# Patient Record
Sex: FEMALE | Race: WHITE | Employment: OTHER | URBAN - METROPOLITAN AREA
[De-identification: names, ages, dates, MRNs, and addresses within clinical notes are randomized per-mention and may not be internally consistent; named-entity substitution may affect disease eponyms.]

---

## 2017-01-25 DIAGNOSIS — E03.9 HYPOTHYROIDISM, UNSPECIFIED TYPE: ICD-10-CM

## 2017-01-25 RX ORDER — LEVOTHYROXINE SODIUM 0.12 MG/1
TABLET ORAL
Qty: 90 TABLET | Refills: 1 | Status: SHIPPED | OUTPATIENT
Start: 2017-01-25 | End: 2017-05-22 | Stop reason: SDUPTHER

## 2017-02-08 ENCOUNTER — OFFICE VISIT (OUTPATIENT)
Dept: FAMILY MEDICINE CLINIC | Age: 67
End: 2017-02-08

## 2017-02-08 VITALS
HEART RATE: 81 BPM | TEMPERATURE: 98.8 F | BODY MASS INDEX: 33.77 KG/M2 | HEIGHT: 60 IN | WEIGHT: 172 LBS | DIASTOLIC BLOOD PRESSURE: 72 MMHG | RESPIRATION RATE: 15 BRPM | SYSTOLIC BLOOD PRESSURE: 108 MMHG

## 2017-02-08 DIAGNOSIS — N30.00 ACUTE CYSTITIS WITHOUT HEMATURIA: Primary | ICD-10-CM

## 2017-02-08 DIAGNOSIS — R30.0 DYSURIA: ICD-10-CM

## 2017-02-08 LAB
BILIRUBIN, POC: ABNORMAL
BLOOD URINE, POC: 200
CLARITY, POC: CLEAR
COLOR, POC: YELLOW
GLUCOSE URINE, POC: ABNORMAL
KETONES, POC: ABNORMAL
LEUKOCYTE EST, POC: 500
NITRITE, POC: ABNORMAL
PH, POC: 6
PROTEIN, POC: 0.3
SPECIFIC GRAVITY, POC: 1.02
UROBILINOGEN, POC: 3.5

## 2017-02-08 PROCEDURE — 1090F PRES/ABSN URINE INCON ASSESS: CPT | Performed by: NURSE PRACTITIONER

## 2017-02-08 PROCEDURE — 3014F SCREEN MAMMO DOC REV: CPT | Performed by: NURSE PRACTITIONER

## 2017-02-08 PROCEDURE — G8484 FLU IMMUNIZE NO ADMIN: HCPCS | Performed by: NURSE PRACTITIONER

## 2017-02-08 PROCEDURE — 4040F PNEUMOC VAC/ADMIN/RCVD: CPT | Performed by: NURSE PRACTITIONER

## 2017-02-08 PROCEDURE — G8417 CALC BMI ABV UP PARAM F/U: HCPCS | Performed by: NURSE PRACTITIONER

## 2017-02-08 PROCEDURE — 99213 OFFICE O/P EST LOW 20 MIN: CPT | Performed by: NURSE PRACTITIONER

## 2017-02-08 PROCEDURE — G8427 DOCREV CUR MEDS BY ELIG CLIN: HCPCS | Performed by: NURSE PRACTITIONER

## 2017-02-08 PROCEDURE — 1123F ACP DISCUSS/DSCN MKR DOCD: CPT | Performed by: NURSE PRACTITIONER

## 2017-02-08 PROCEDURE — 81003 URINALYSIS AUTO W/O SCOPE: CPT | Performed by: NURSE PRACTITIONER

## 2017-02-08 PROCEDURE — 3017F COLORECTAL CA SCREEN DOC REV: CPT | Performed by: NURSE PRACTITIONER

## 2017-02-08 PROCEDURE — 1036F TOBACCO NON-USER: CPT | Performed by: NURSE PRACTITIONER

## 2017-02-08 PROCEDURE — G8400 PT W/DXA NO RESULTS DOC: HCPCS | Performed by: NURSE PRACTITIONER

## 2017-02-08 RX ORDER — CIPROFLOXACIN 500 MG/1
500 TABLET, FILM COATED ORAL 2 TIMES DAILY
Qty: 20 TABLET | Refills: 0 | Status: SHIPPED | OUTPATIENT
Start: 2017-02-08 | End: 2017-02-18

## 2017-02-08 ASSESSMENT — ENCOUNTER SYMPTOMS
NAUSEA: 0
VOMITING: 0

## 2017-04-25 RX ORDER — SERTRALINE HYDROCHLORIDE 25 MG/1
TABLET, FILM COATED ORAL
Qty: 30 TABLET | Refills: 5 | Status: SHIPPED | OUTPATIENT
Start: 2017-04-25 | End: 2017-09-02 | Stop reason: SDUPTHER

## 2017-04-26 RX ORDER — HYDROCHLOROTHIAZIDE 25 MG/1
12.5 TABLET ORAL DAILY
Qty: 30 TABLET | Refills: 6 | Status: SHIPPED | OUTPATIENT
Start: 2017-04-26 | End: 2017-08-01

## 2017-04-26 RX ORDER — METOPROLOL SUCCINATE 50 MG/1
50 TABLET, EXTENDED RELEASE ORAL DAILY
Qty: 30 TABLET | Refills: 6 | Status: SHIPPED | OUTPATIENT
Start: 2017-04-26 | End: 2017-08-01

## 2017-05-03 ENCOUNTER — OFFICE VISIT (OUTPATIENT)
Dept: FAMILY MEDICINE CLINIC | Age: 67
End: 2017-05-03

## 2017-05-03 VITALS
DIASTOLIC BLOOD PRESSURE: 60 MMHG | SYSTOLIC BLOOD PRESSURE: 115 MMHG | BODY MASS INDEX: 34.26 KG/M2 | WEIGHT: 174.5 LBS | OXYGEN SATURATION: 96 % | TEMPERATURE: 99.6 F | HEIGHT: 60 IN | HEART RATE: 83 BPM | RESPIRATION RATE: 16 BRPM

## 2017-05-03 DIAGNOSIS — R30.0 DYSURIA: Primary | ICD-10-CM

## 2017-05-03 DIAGNOSIS — N39.0 FREQUENT UTI: ICD-10-CM

## 2017-05-03 DIAGNOSIS — N30.20 CHRONIC CYSTITIS: ICD-10-CM

## 2017-05-03 LAB
BILIRUBIN, POC: NEGATIVE
BLOOD URINE, POC: ABNORMAL
CLARITY, POC: ABNORMAL
COLOR, POC: ABNORMAL
GLUCOSE URINE, POC: NEGATIVE
KETONES, POC: NEGATIVE
LEUKOCYTE EST, POC: NEGATIVE
NITRITE, POC: NEGATIVE
PH, POC: 6
PROTEIN, POC: ABNORMAL
SPECIFIC GRAVITY, POC: 1.03
UROBILINOGEN, POC: ABNORMAL

## 2017-05-03 PROCEDURE — G8417 CALC BMI ABV UP PARAM F/U: HCPCS | Performed by: NURSE PRACTITIONER

## 2017-05-03 PROCEDURE — 3017F COLORECTAL CA SCREEN DOC REV: CPT | Performed by: NURSE PRACTITIONER

## 2017-05-03 PROCEDURE — 1036F TOBACCO NON-USER: CPT | Performed by: NURSE PRACTITIONER

## 2017-05-03 PROCEDURE — 1123F ACP DISCUSS/DSCN MKR DOCD: CPT | Performed by: NURSE PRACTITIONER

## 2017-05-03 PROCEDURE — 99213 OFFICE O/P EST LOW 20 MIN: CPT | Performed by: NURSE PRACTITIONER

## 2017-05-03 PROCEDURE — G8400 PT W/DXA NO RESULTS DOC: HCPCS | Performed by: NURSE PRACTITIONER

## 2017-05-03 PROCEDURE — 1090F PRES/ABSN URINE INCON ASSESS: CPT | Performed by: NURSE PRACTITIONER

## 2017-05-03 PROCEDURE — 81003 URINALYSIS AUTO W/O SCOPE: CPT | Performed by: NURSE PRACTITIONER

## 2017-05-03 PROCEDURE — 4040F PNEUMOC VAC/ADMIN/RCVD: CPT | Performed by: NURSE PRACTITIONER

## 2017-05-03 PROCEDURE — 3014F SCREEN MAMMO DOC REV: CPT | Performed by: NURSE PRACTITIONER

## 2017-05-03 PROCEDURE — G8427 DOCREV CUR MEDS BY ELIG CLIN: HCPCS | Performed by: NURSE PRACTITIONER

## 2017-05-03 RX ORDER — PHENAZOPYRIDINE HYDROCHLORIDE 200 MG/1
200 TABLET, FILM COATED ORAL 3 TIMES DAILY PRN
Qty: 30 TABLET | Refills: 0 | Status: SHIPPED | OUTPATIENT
Start: 2017-05-03 | End: 2017-05-06

## 2017-05-03 RX ORDER — NITROFURANTOIN 25; 75 MG/1; MG/1
100 CAPSULE ORAL 2 TIMES DAILY
Qty: 10 CAPSULE | Refills: 0 | Status: SHIPPED | OUTPATIENT
Start: 2017-05-03 | End: 2017-05-08

## 2017-05-03 ASSESSMENT — ENCOUNTER SYMPTOMS
NAUSEA: 0
VOMITING: 0

## 2017-05-04 DIAGNOSIS — R30.0 DYSURIA: ICD-10-CM

## 2017-05-04 DIAGNOSIS — N39.0 FREQUENT UTI: ICD-10-CM

## 2017-05-04 DIAGNOSIS — N30.20 CHRONIC CYSTITIS: ICD-10-CM

## 2017-05-06 LAB — URINE CULTURE, ROUTINE: NORMAL

## 2017-05-08 DIAGNOSIS — R39.9 UTI SYMPTOMS: ICD-10-CM

## 2017-05-08 DIAGNOSIS — R10.30 LOWER ABDOMINAL PAIN: Primary | ICD-10-CM

## 2017-05-09 DIAGNOSIS — N32.89 BLADDER SPASM: ICD-10-CM

## 2017-05-09 DIAGNOSIS — N30.11 INTERSTITIAL CYSTITIS (CHRONIC) WITH HEMATURIA: Primary | ICD-10-CM

## 2017-05-09 RX ORDER — OXYBUTYNIN CHLORIDE 10 MG/1
10 TABLET, EXTENDED RELEASE ORAL DAILY
Qty: 30 TABLET | Refills: 3 | Status: SHIPPED | OUTPATIENT
Start: 2017-05-09 | End: 2017-08-01

## 2017-05-22 ENCOUNTER — OFFICE VISIT (OUTPATIENT)
Dept: FAMILY MEDICINE CLINIC | Age: 67
End: 2017-05-22

## 2017-05-22 VITALS
HEIGHT: 60 IN | SYSTOLIC BLOOD PRESSURE: 100 MMHG | WEIGHT: 170 LBS | TEMPERATURE: 98.4 F | RESPIRATION RATE: 16 BRPM | BODY MASS INDEX: 33.38 KG/M2 | HEART RATE: 76 BPM | DIASTOLIC BLOOD PRESSURE: 58 MMHG

## 2017-05-22 DIAGNOSIS — E78.5 DYSLIPIDEMIA: ICD-10-CM

## 2017-05-22 DIAGNOSIS — E03.9 HYPOTHYROIDISM, UNSPECIFIED TYPE: ICD-10-CM

## 2017-05-22 DIAGNOSIS — E03.9 HYPOTHYROIDISM, UNSPECIFIED TYPE: Primary | ICD-10-CM

## 2017-05-22 DIAGNOSIS — S46.001A ROTATOR CUFF INJURY, RIGHT, INITIAL ENCOUNTER: ICD-10-CM

## 2017-05-22 DIAGNOSIS — M67.911 DYSFUNCTION OF RIGHT ROTATOR CUFF: ICD-10-CM

## 2017-05-22 DIAGNOSIS — R53.83 FATIGUE, UNSPECIFIED TYPE: ICD-10-CM

## 2017-05-22 DIAGNOSIS — E55.9 VITAMIN D DEFICIENCY: ICD-10-CM

## 2017-05-22 LAB
ALBUMIN SERPL-MCNC: 4.2 G/DL (ref 3.9–4.9)
ALP BLD-CCNC: 59 U/L (ref 40–130)
ALT SERPL-CCNC: 18 U/L (ref 0–33)
ANION GAP SERPL CALCULATED.3IONS-SCNC: 12 MEQ/L (ref 7–13)
AST SERPL-CCNC: 15 U/L (ref 0–35)
BILIRUB SERPL-MCNC: 0.2 MG/DL (ref 0–1.2)
BUN BLDV-MCNC: 14 MG/DL (ref 8–23)
CALCIUM SERPL-MCNC: 9 MG/DL (ref 8.6–10.2)
CHLORIDE BLD-SCNC: 100 MEQ/L (ref 98–107)
CHOLESTEROL, TOTAL: 188 MG/DL (ref 0–199)
CO2: 25 MEQ/L (ref 22–29)
CREAT SERPL-MCNC: 0.53 MG/DL (ref 0.5–0.9)
GFR AFRICAN AMERICAN: >60
GFR NON-AFRICAN AMERICAN: >60
GLOBULIN: 2.5 G/DL (ref 2.3–3.5)
GLUCOSE BLD-MCNC: 85 MG/DL (ref 74–109)
HCT VFR BLD CALC: 41.4 % (ref 37–47)
HDLC SERPL-MCNC: 62 MG/DL (ref 40–59)
HEMOGLOBIN: 13.5 G/DL (ref 12–16)
LDL CHOLESTEROL CALCULATED: 98 MG/DL (ref 0–129)
MCH RBC QN AUTO: 28.8 PG (ref 27–31.3)
MCHC RBC AUTO-ENTMCNC: 32.6 % (ref 33–37)
MCV RBC AUTO: 88.1 FL (ref 82–100)
PDW BLD-RTO: 14.2 % (ref 11.5–14.5)
PLATELET # BLD: 252 K/UL (ref 130–400)
POTASSIUM SERPL-SCNC: 4.4 MEQ/L (ref 3.5–5.1)
RBC # BLD: 4.7 M/UL (ref 4.2–5.4)
SODIUM BLD-SCNC: 137 MEQ/L (ref 132–144)
T4 FREE: 1.71 NG/DL (ref 0.93–1.7)
TOTAL PROTEIN: 6.7 G/DL (ref 6.4–8.1)
TRIGL SERPL-MCNC: 142 MG/DL (ref 0–200)
TSH SERPL DL<=0.05 MIU/L-ACNC: 0.24 UIU/ML (ref 0.27–4.2)
VITAMIN B-12: 360 PG/ML (ref 211–946)
VITAMIN D 25-HYDROXY: 29.8 NG/ML (ref 30–100)
WBC # BLD: 5.7 K/UL (ref 4.8–10.8)

## 2017-05-22 PROCEDURE — 4040F PNEUMOC VAC/ADMIN/RCVD: CPT | Performed by: FAMILY MEDICINE

## 2017-05-22 PROCEDURE — G8417 CALC BMI ABV UP PARAM F/U: HCPCS | Performed by: FAMILY MEDICINE

## 2017-05-22 PROCEDURE — G8400 PT W/DXA NO RESULTS DOC: HCPCS | Performed by: FAMILY MEDICINE

## 2017-05-22 PROCEDURE — 1036F TOBACCO NON-USER: CPT | Performed by: FAMILY MEDICINE

## 2017-05-22 PROCEDURE — 3014F SCREEN MAMMO DOC REV: CPT | Performed by: FAMILY MEDICINE

## 2017-05-22 PROCEDURE — 1123F ACP DISCUSS/DSCN MKR DOCD: CPT | Performed by: FAMILY MEDICINE

## 2017-05-22 PROCEDURE — 3017F COLORECTAL CA SCREEN DOC REV: CPT | Performed by: FAMILY MEDICINE

## 2017-05-22 PROCEDURE — G8427 DOCREV CUR MEDS BY ELIG CLIN: HCPCS | Performed by: FAMILY MEDICINE

## 2017-05-22 PROCEDURE — 1090F PRES/ABSN URINE INCON ASSESS: CPT | Performed by: FAMILY MEDICINE

## 2017-05-22 PROCEDURE — 99214 OFFICE O/P EST MOD 30 MIN: CPT | Performed by: FAMILY MEDICINE

## 2017-05-22 RX ORDER — LEVOTHYROXINE SODIUM 0.12 MG/1
TABLET ORAL
Qty: 90 TABLET | Refills: 1 | Status: SHIPPED | OUTPATIENT
Start: 2017-05-22 | End: 2018-02-28 | Stop reason: SDUPTHER

## 2017-05-22 ASSESSMENT — PATIENT HEALTH QUESTIONNAIRE - PHQ9
1. LITTLE INTEREST OR PLEASURE IN DOING THINGS: 0
SUM OF ALL RESPONSES TO PHQ9 QUESTIONS 1 & 2: 0
SUM OF ALL RESPONSES TO PHQ QUESTIONS 1-9: 0
2. FEELING DOWN, DEPRESSED OR HOPELESS: 0

## 2017-05-23 ENCOUNTER — HOSPITAL ENCOUNTER (OUTPATIENT)
Dept: PHYSICAL THERAPY | Age: 67
Setting detail: THERAPIES SERIES
Discharge: HOME | End: 2017-05-23
Payer: COMMERCIAL

## 2017-05-23 PROCEDURE — G8985 CARRY GOAL STATUS: HCPCS

## 2017-05-23 PROCEDURE — G8984 CARRY CURRENT STATUS: HCPCS

## 2017-05-23 PROCEDURE — 97161 PT EVAL LOW COMPLEX 20 MIN: CPT

## 2017-05-23 ASSESSMENT — PAIN SCALES - GENERAL: PAINLEVEL_OUTOF10: 5

## 2017-05-23 ASSESSMENT — PAIN DESCRIPTION - LOCATION: LOCATION: SHOULDER

## 2017-05-23 ASSESSMENT — PAIN DESCRIPTION - ORIENTATION: ORIENTATION: RIGHT

## 2017-05-23 ASSESSMENT — PAIN DESCRIPTION - DESCRIPTORS: DESCRIPTORS: ACHING

## 2017-05-25 ENCOUNTER — HOSPITAL ENCOUNTER (OUTPATIENT)
Dept: PHYSICAL THERAPY | Age: 67
Setting detail: THERAPIES SERIES
Discharge: HOME | End: 2017-05-25
Payer: COMMERCIAL

## 2017-05-25 PROCEDURE — 97110 THERAPEUTIC EXERCISES: CPT

## 2017-05-25 PROCEDURE — 97140 MANUAL THERAPY 1/> REGIONS: CPT

## 2017-05-25 ASSESSMENT — PAIN SCALES - GENERAL: PAINLEVEL_OUTOF10: 6

## 2017-05-25 ASSESSMENT — PAIN DESCRIPTION - LOCATION: LOCATION: SHOULDER

## 2017-05-25 ASSESSMENT — PAIN DESCRIPTION - DESCRIPTORS: DESCRIPTORS: ACHING

## 2017-05-25 ASSESSMENT — PAIN DESCRIPTION - ORIENTATION: ORIENTATION: RIGHT

## 2017-05-30 ENCOUNTER — HOSPITAL ENCOUNTER (OUTPATIENT)
Dept: PHYSICAL THERAPY | Age: 67
Setting detail: THERAPIES SERIES
Discharge: HOME | End: 2017-05-30
Payer: MEDICARE

## 2017-05-30 ENCOUNTER — TELEPHONE (OUTPATIENT)
Dept: FAMILY MEDICINE CLINIC | Age: 67
End: 2017-05-30

## 2017-05-30 DIAGNOSIS — S46.001A ROTATOR CUFF INJURY, RIGHT, INITIAL ENCOUNTER: Primary | ICD-10-CM

## 2017-05-30 PROCEDURE — 97110 THERAPEUTIC EXERCISES: CPT

## 2017-05-30 PROCEDURE — 97140 MANUAL THERAPY 1/> REGIONS: CPT

## 2017-05-30 ASSESSMENT — PAIN DESCRIPTION - LOCATION: LOCATION: SHOULDER

## 2017-05-30 ASSESSMENT — PAIN DESCRIPTION - DESCRIPTORS: DESCRIPTORS: ACHING

## 2017-05-30 ASSESSMENT — PAIN DESCRIPTION - ORIENTATION: ORIENTATION: RIGHT

## 2017-05-30 ASSESSMENT — PAIN SCALES - GENERAL: PAINLEVEL_OUTOF10: 9

## 2017-06-01 ENCOUNTER — TELEPHONE (OUTPATIENT)
Dept: FAMILY MEDICINE CLINIC | Age: 67
End: 2017-06-01

## 2017-06-01 DIAGNOSIS — S46.001A ROTATOR CUFF INJURY, RIGHT, INITIAL ENCOUNTER: Primary | ICD-10-CM

## 2017-06-06 ENCOUNTER — HOSPITAL ENCOUNTER (OUTPATIENT)
Dept: PHYSICAL THERAPY | Age: 67
Discharge: HOME | End: 2017-06-06

## 2017-06-07 ENCOUNTER — HOSPITAL ENCOUNTER (OUTPATIENT)
Dept: MRI IMAGING | Age: 67
Discharge: HOME | End: 2017-06-07
Payer: MEDICARE

## 2017-06-07 DIAGNOSIS — S46.001A ROTATOR CUFF INJURY, RIGHT, INITIAL ENCOUNTER: ICD-10-CM

## 2017-06-07 PROCEDURE — 73221 MRI JOINT UPR EXTREM W/O DYE: CPT

## 2017-06-08 ENCOUNTER — HOSPITAL ENCOUNTER (OUTPATIENT)
Dept: PHYSICAL THERAPY | Age: 67
Setting detail: THERAPIES SERIES
Discharge: HOME | End: 2017-06-08
Payer: COMMERCIAL

## 2017-06-08 PROCEDURE — 97140 MANUAL THERAPY 1/> REGIONS: CPT

## 2017-06-08 PROCEDURE — G0283 ELEC STIM OTHER THAN WOUND: HCPCS

## 2017-06-08 PROCEDURE — 97110 THERAPEUTIC EXERCISES: CPT

## 2017-06-08 ASSESSMENT — PAIN DESCRIPTION - FREQUENCY: FREQUENCY: CONTINUOUS

## 2017-06-08 ASSESSMENT — PAIN DESCRIPTION - PROGRESSION: CLINICAL_PROGRESSION: GRADUALLY WORSENING

## 2017-06-08 ASSESSMENT — PAIN SCALES - GENERAL: PAINLEVEL_OUTOF10: 3

## 2017-06-08 ASSESSMENT — PAIN DESCRIPTION - ORIENTATION: ORIENTATION: RIGHT

## 2017-06-08 ASSESSMENT — PAIN DESCRIPTION - DESCRIPTORS: DESCRIPTORS: ACHING

## 2017-06-08 ASSESSMENT — PAIN DESCRIPTION - LOCATION: LOCATION: SHOULDER

## 2017-06-12 ENCOUNTER — TELEPHONE (OUTPATIENT)
Dept: FAMILY MEDICINE CLINIC | Age: 67
End: 2017-06-12

## 2017-06-12 DIAGNOSIS — S46.811A TRAUMATIC TEAR OF SUPRASPINATUS TENDON OF RIGHT SHOULDER, INITIAL ENCOUNTER: Primary | ICD-10-CM

## 2017-06-14 ENCOUNTER — TELEPHONE (OUTPATIENT)
Dept: FAMILY MEDICINE CLINIC | Age: 67
End: 2017-06-14

## 2017-06-14 DIAGNOSIS — M25.511 CHRONIC PAIN OF BOTH SHOULDERS: Primary | ICD-10-CM

## 2017-06-14 DIAGNOSIS — M25.512 CHRONIC PAIN OF BOTH SHOULDERS: Primary | ICD-10-CM
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
## 2017-06-14 DIAGNOSIS — G89.29 CHRONIC PAIN OF BOTH SHOULDERS: Primary | ICD-10-CM

## 2017-06-15 ENCOUNTER — HOSPITAL ENCOUNTER (OUTPATIENT)
Dept: PHYSICAL THERAPY | Age: 67
Discharge: HOME | End: 2017-06-15

## 2017-06-29 ENCOUNTER — HOSPITAL ENCOUNTER (OUTPATIENT)
Dept: ORTHOPEDIC SURGERY | Age: 67
Discharge: HOME | End: 2017-06-29
Payer: MEDICARE

## 2017-06-29 DIAGNOSIS — M25.512 PAIN OF BOTH SHOULDER JOINTS: ICD-10-CM

## 2017-06-29 DIAGNOSIS — M25.511 PAIN OF BOTH SHOULDER JOINTS: ICD-10-CM

## 2017-07-14 ENCOUNTER — CLINICAL DOCUMENTATION (OUTPATIENT)
Dept: PHYSICAL THERAPY | Age: 67
End: 2017-07-14

## 2017-08-01 ENCOUNTER — OFFICE VISIT (OUTPATIENT)
Dept: FAMILY MEDICINE CLINIC | Age: 67
End: 2017-08-01

## 2017-08-01 VITALS
DIASTOLIC BLOOD PRESSURE: 72 MMHG | HEIGHT: 60 IN | SYSTOLIC BLOOD PRESSURE: 110 MMHG | WEIGHT: 176 LBS | HEART RATE: 74 BPM | TEMPERATURE: 99.2 F | RESPIRATION RATE: 14 BRPM | BODY MASS INDEX: 34.55 KG/M2

## 2017-08-01 DIAGNOSIS — E03.9 HYPOTHYROIDISM, UNSPECIFIED TYPE: Primary | ICD-10-CM

## 2017-08-01 DIAGNOSIS — M75.111 INCOMPLETE TEAR OF RIGHT ROTATOR CUFF: ICD-10-CM

## 2017-08-01 DIAGNOSIS — B00.2 ORAL HERPES: ICD-10-CM

## 2017-08-01 PROCEDURE — 3014F SCREEN MAMMO DOC REV: CPT | Performed by: FAMILY MEDICINE

## 2017-08-01 PROCEDURE — G8400 PT W/DXA NO RESULTS DOC: HCPCS | Performed by: FAMILY MEDICINE

## 2017-08-01 PROCEDURE — 1036F TOBACCO NON-USER: CPT | Performed by: FAMILY MEDICINE

## 2017-08-01 PROCEDURE — 4040F PNEUMOC VAC/ADMIN/RCVD: CPT | Performed by: FAMILY MEDICINE

## 2017-08-01 PROCEDURE — G8417 CALC BMI ABV UP PARAM F/U: HCPCS | Performed by: FAMILY MEDICINE

## 2017-08-01 PROCEDURE — 99214 OFFICE O/P EST MOD 30 MIN: CPT | Performed by: FAMILY MEDICINE

## 2017-08-01 PROCEDURE — 3017F COLORECTAL CA SCREEN DOC REV: CPT | Performed by: FAMILY MEDICINE

## 2017-08-01 PROCEDURE — 1123F ACP DISCUSS/DSCN MKR DOCD: CPT | Performed by: FAMILY MEDICINE

## 2017-08-01 PROCEDURE — 1090F PRES/ABSN URINE INCON ASSESS: CPT | Performed by: FAMILY MEDICINE

## 2017-08-01 PROCEDURE — G8427 DOCREV CUR MEDS BY ELIG CLIN: HCPCS | Performed by: FAMILY MEDICINE

## 2017-08-01 RX ORDER — ACYCLOVIR 200 MG/1
200 CAPSULE ORAL 3 TIMES DAILY
Qty: 90 CAPSULE | Refills: 3 | Status: SHIPPED | OUTPATIENT
Start: 2017-08-01 | End: 2017-08-31

## 2017-08-01 RX ORDER — ZOLPIDEM TARTRATE 10 MG/1
10 TABLET ORAL NIGHTLY PRN
Qty: 30 TABLET | Refills: 5 | Status: SHIPPED | OUTPATIENT
Start: 2017-08-01 | End: 2017-08-31

## 2017-08-01 RX ORDER — FAMCICLOVIR 500 MG/1
TABLET, FILM COATED ORAL
COMMUNITY
Start: 2017-07-24 | End: 2017-08-01

## 2017-08-10 DIAGNOSIS — E03.9 HYPOTHYROIDISM, UNSPECIFIED TYPE: ICD-10-CM

## 2017-08-11 RX ORDER — LEVOTHYROXINE SODIUM 0.12 MG/1
TABLET ORAL
Qty: 90 TABLET | Refills: 0 | Status: SHIPPED | OUTPATIENT
Start: 2017-08-11 | End: 2017-11-06 | Stop reason: SDUPTHER

## 2017-09-05 RX ORDER — SERTRALINE HYDROCHLORIDE 25 MG/1
TABLET, FILM COATED ORAL
Qty: 90 TABLET | Refills: 1 | Status: SHIPPED | OUTPATIENT
Start: 2017-09-05 | End: 2018-02-28 | Stop reason: SDUPTHER

## 2017-10-12 RX ORDER — HYDROCHLOROTHIAZIDE 25 MG/1
TABLET ORAL
Qty: 45 TABLET | Refills: 5 | OUTPATIENT
Start: 2017-10-12

## 2017-10-16 ENCOUNTER — CLINICAL DOCUMENTATION (OUTPATIENT)
Dept: FAMILY MEDICINE CLINIC | Age: 67
End: 2017-10-16

## 2017-10-16 RX ORDER — ALPRAZOLAM 0.5 MG/1
0.5 TABLET ORAL 3 TIMES DAILY PRN
Qty: 20 TABLET | Refills: 1 | Status: SHIPPED | OUTPATIENT
Start: 2017-10-16 | End: 2017-11-15

## 2017-11-06 DIAGNOSIS — E03.9 HYPOTHYROIDISM, UNSPECIFIED TYPE: ICD-10-CM

## 2017-11-06 RX ORDER — LEVOTHYROXINE SODIUM 0.12 MG/1
TABLET ORAL
Qty: 90 TABLET | Refills: 1 | Status: SHIPPED | OUTPATIENT
Start: 2017-11-06 | End: 2018-02-28 | Stop reason: SDUPTHER

## 2018-01-25 DIAGNOSIS — E03.9 HYPOTHYROIDISM, UNSPECIFIED TYPE: Primary | ICD-10-CM

## 2018-02-13 DIAGNOSIS — E03.9 HYPOTHYROIDISM, UNSPECIFIED TYPE: ICD-10-CM

## 2018-02-13 LAB
T4 FREE: 1.4 NG/DL (ref 0.93–1.7)
TSH SERPL DL<=0.05 MIU/L-ACNC: 1.1 UIU/ML (ref 0.27–4.2)

## 2018-02-28 ENCOUNTER — OFFICE VISIT (OUTPATIENT)
Dept: FAMILY MEDICINE CLINIC | Age: 68
End: 2018-02-28
Payer: MEDICARE

## 2018-02-28 VITALS
SYSTOLIC BLOOD PRESSURE: 112 MMHG | OXYGEN SATURATION: 98 % | DIASTOLIC BLOOD PRESSURE: 68 MMHG | WEIGHT: 185 LBS | TEMPERATURE: 98.2 F | HEIGHT: 60 IN | RESPIRATION RATE: 14 BRPM | BODY MASS INDEX: 36.32 KG/M2 | HEART RATE: 78 BPM

## 2018-02-28 DIAGNOSIS — R73.9 HYPERGLYCEMIA: ICD-10-CM

## 2018-02-28 DIAGNOSIS — E03.9 HYPOTHYROIDISM, UNSPECIFIED TYPE: Primary | ICD-10-CM

## 2018-02-28 DIAGNOSIS — Z12.31 VISIT FOR SCREENING MAMMOGRAM: ICD-10-CM

## 2018-02-28 DIAGNOSIS — E78.5 DYSLIPIDEMIA: ICD-10-CM

## 2018-02-28 DIAGNOSIS — R00.2 PALPITATIONS: ICD-10-CM

## 2018-02-28 DIAGNOSIS — C44.111: ICD-10-CM

## 2018-02-28 PROCEDURE — G8427 DOCREV CUR MEDS BY ELIG CLIN: HCPCS | Performed by: FAMILY MEDICINE

## 2018-02-28 PROCEDURE — G8484 FLU IMMUNIZE NO ADMIN: HCPCS | Performed by: FAMILY MEDICINE

## 2018-02-28 PROCEDURE — G8417 CALC BMI ABV UP PARAM F/U: HCPCS | Performed by: FAMILY MEDICINE

## 2018-02-28 PROCEDURE — 99214 OFFICE O/P EST MOD 30 MIN: CPT | Performed by: FAMILY MEDICINE

## 2018-02-28 PROCEDURE — G8400 PT W/DXA NO RESULTS DOC: HCPCS | Performed by: FAMILY MEDICINE

## 2018-02-28 PROCEDURE — 93000 ELECTROCARDIOGRAM COMPLETE: CPT | Performed by: FAMILY MEDICINE

## 2018-02-28 PROCEDURE — 1123F ACP DISCUSS/DSCN MKR DOCD: CPT | Performed by: FAMILY MEDICINE

## 2018-02-28 PROCEDURE — 4040F PNEUMOC VAC/ADMIN/RCVD: CPT | Performed by: FAMILY MEDICINE

## 2018-02-28 PROCEDURE — 1090F PRES/ABSN URINE INCON ASSESS: CPT | Performed by: FAMILY MEDICINE

## 2018-02-28 PROCEDURE — 1036F TOBACCO NON-USER: CPT | Performed by: FAMILY MEDICINE

## 2018-02-28 PROCEDURE — 3017F COLORECTAL CA SCREEN DOC REV: CPT | Performed by: FAMILY MEDICINE

## 2018-02-28 PROCEDURE — 3014F SCREEN MAMMO DOC REV: CPT | Performed by: FAMILY MEDICINE

## 2018-02-28 RX ORDER — SERTRALINE HYDROCHLORIDE 25 MG/1
25 TABLET, FILM COATED ORAL DAILY
Qty: 90 TABLET | Refills: 3 | Status: SHIPPED | OUTPATIENT
Start: 2018-02-28 | End: 2018-04-11 | Stop reason: ALTCHOICE

## 2018-02-28 RX ORDER — PREDNISOLONE ACETATE 10 MG/ML
SUSPENSION/ DROPS OPHTHALMIC
Refills: 1 | COMMUNITY
Start: 2018-02-09 | End: 2018-04-11 | Stop reason: ALTCHOICE

## 2018-02-28 RX ORDER — HYDROCHLOROTHIAZIDE 25 MG/1
25 TABLET ORAL
COMMUNITY
Start: 2018-02-11 | End: 2018-04-11 | Stop reason: ALTCHOICE

## 2018-02-28 RX ORDER — ALPRAZOLAM 0.5 MG/1
TABLET ORAL
Refills: 1 | COMMUNITY
Start: 2018-02-15 | End: 2018-03-30 | Stop reason: SDUPTHER

## 2018-02-28 RX ORDER — LEVOTHYROXINE SODIUM 0.12 MG/1
125 TABLET ORAL DAILY
Qty: 90 TABLET | Refills: 3 | Status: SHIPPED | OUTPATIENT
Start: 2018-02-28 | End: 2018-10-22 | Stop reason: SDUPTHER

## 2018-03-05 ENCOUNTER — HOSPITAL ENCOUNTER (OUTPATIENT)
Dept: WOMENS IMAGING | Age: 68
Discharge: HOME | End: 2018-03-07
Payer: MEDICARE

## 2018-03-05 DIAGNOSIS — Z12.31 VISIT FOR SCREENING MAMMOGRAM: ICD-10-CM

## 2018-03-05 PROCEDURE — 77067 SCR MAMMO BI INCL CAD: CPT

## 2018-03-13 RX ORDER — SERTRALINE HYDROCHLORIDE 25 MG/1
TABLET, FILM COATED ORAL
Qty: 90 TABLET | Refills: 1 | Status: SHIPPED | OUTPATIENT
Start: 2018-03-13 | End: 2018-04-11 | Stop reason: ALTCHOICE

## 2018-03-19 ENCOUNTER — HOSPITAL ENCOUNTER (OUTPATIENT)
Dept: NON INVASIVE DIAGNOSTICS | Age: 68
Discharge: HOME | End: 2018-03-19
Payer: MEDICARE

## 2018-03-19 DIAGNOSIS — R00.2 PALPITATIONS: ICD-10-CM

## 2018-03-19 PROCEDURE — 93226 XTRNL ECG REC<48 HR SCAN A/R: CPT

## 2018-03-19 PROCEDURE — 93225 XTRNL ECG REC<48 HRS REC: CPT

## 2018-03-19 NOTE — LETTER
[facility] PCP  [address] [address]  Phone: [phone]  Fax: [phone]    Brennan Ham MD    April 2, 2018    [place] [address]    Dear Melida Encarnacion,    Below are the results from your recent visit:    Holter monitor test was normal.    I have reviewed all lab results which are normal or stable. If you have any questions or concerns, please feel free to schedule a follow up appointment with me and we can discuss these results further. Thank you.      Sincerely,    Brennan Ham MD

## 2018-03-22 DIAGNOSIS — R73.9 HYPERGLYCEMIA: ICD-10-CM

## 2018-03-22 DIAGNOSIS — E78.5 DYSLIPIDEMIA: ICD-10-CM

## 2018-03-22 DIAGNOSIS — R00.2 PALPITATIONS: ICD-10-CM

## 2018-03-22 LAB
ALBUMIN SERPL-MCNC: 4.2 G/DL (ref 3.9–4.9)
ALP BLD-CCNC: 63 U/L (ref 40–130)
ALT SERPL-CCNC: 17 U/L (ref 0–33)
ANION GAP SERPL CALCULATED.3IONS-SCNC: 15 MEQ/L (ref 7–13)
AST SERPL-CCNC: 14 U/L (ref 0–35)
BILIRUB SERPL-MCNC: 0.4 MG/DL (ref 0–1.2)
BUN BLDV-MCNC: 13 MG/DL (ref 8–23)
CALCIUM SERPL-MCNC: 9.1 MG/DL (ref 8.6–10.2)
CHLORIDE BLD-SCNC: 105 MEQ/L (ref 98–107)
CHOLESTEROL, TOTAL: 181 MG/DL (ref 0–199)
CO2: 23 MEQ/L (ref 22–29)
CREAT SERPL-MCNC: 0.53 MG/DL (ref 0.5–0.9)
GFR AFRICAN AMERICAN: >60
GFR NON-AFRICAN AMERICAN: >60
GLOBULIN: 2.5 G/DL (ref 2.3–3.5)
GLUCOSE BLD-MCNC: 105 MG/DL (ref 74–109)
HBA1C MFR BLD: 5.8 % (ref 4.8–5.9)
HCT VFR BLD CALC: 40.5 % (ref 37–47)
HDLC SERPL-MCNC: 71 MG/DL (ref 40–59)
HEMOGLOBIN: 13.6 G/DL (ref 12–16)
LDL CHOLESTEROL CALCULATED: 93 MG/DL (ref 0–129)
MCH RBC QN AUTO: 29.4 PG (ref 27–31.3)
MCHC RBC AUTO-ENTMCNC: 33.5 % (ref 33–37)
MCV RBC AUTO: 88 FL (ref 82–100)
PDW BLD-RTO: 13.4 % (ref 11.5–14.5)
PLATELET # BLD: 255 K/UL (ref 130–400)
POTASSIUM SERPL-SCNC: 4.8 MEQ/L (ref 3.5–5.1)
RBC # BLD: 4.6 M/UL (ref 4.2–5.4)
SODIUM BLD-SCNC: 143 MEQ/L (ref 132–144)
TOTAL PROTEIN: 6.7 G/DL (ref 6.4–8.1)
TRIGL SERPL-MCNC: 83 MG/DL (ref 0–200)
WBC # BLD: 4.6 K/UL (ref 4.8–10.8)

## 2018-03-30 ENCOUNTER — TELEPHONE (OUTPATIENT)
Dept: INTERNAL MEDICINE CLINIC | Age: 68
End: 2018-03-30

## 2018-03-30 ENCOUNTER — OFFICE VISIT (OUTPATIENT)
Dept: FAMILY MEDICINE CLINIC | Age: 68
End: 2018-03-30
Payer: MEDICARE

## 2018-03-30 VITALS
DIASTOLIC BLOOD PRESSURE: 80 MMHG | TEMPERATURE: 98 F | WEIGHT: 180 LBS | OXYGEN SATURATION: 98 % | HEART RATE: 79 BPM | SYSTOLIC BLOOD PRESSURE: 132 MMHG | BODY MASS INDEX: 35.34 KG/M2 | HEIGHT: 60 IN | RESPIRATION RATE: 14 BRPM

## 2018-03-30 DIAGNOSIS — E03.9 HYPOTHYROIDISM, UNSPECIFIED TYPE: Primary | ICD-10-CM

## 2018-03-30 DIAGNOSIS — R00.2 HEART PALPITATIONS: ICD-10-CM

## 2018-03-30 DIAGNOSIS — C44.111: ICD-10-CM

## 2018-03-30 PROCEDURE — G8417 CALC BMI ABV UP PARAM F/U: HCPCS | Performed by: FAMILY MEDICINE

## 2018-03-30 PROCEDURE — G8427 DOCREV CUR MEDS BY ELIG CLIN: HCPCS | Performed by: FAMILY MEDICINE

## 2018-03-30 PROCEDURE — G8484 FLU IMMUNIZE NO ADMIN: HCPCS | Performed by: FAMILY MEDICINE

## 2018-03-30 PROCEDURE — 1123F ACP DISCUSS/DSCN MKR DOCD: CPT | Performed by: FAMILY MEDICINE

## 2018-03-30 PROCEDURE — 1036F TOBACCO NON-USER: CPT | Performed by: FAMILY MEDICINE

## 2018-03-30 PROCEDURE — 3017F COLORECTAL CA SCREEN DOC REV: CPT | Performed by: FAMILY MEDICINE

## 2018-03-30 PROCEDURE — 1090F PRES/ABSN URINE INCON ASSESS: CPT | Performed by: FAMILY MEDICINE

## 2018-03-30 PROCEDURE — 99214 OFFICE O/P EST MOD 30 MIN: CPT | Performed by: FAMILY MEDICINE

## 2018-03-30 PROCEDURE — 4040F PNEUMOC VAC/ADMIN/RCVD: CPT | Performed by: FAMILY MEDICINE

## 2018-03-30 PROCEDURE — 3014F SCREEN MAMMO DOC REV: CPT | Performed by: FAMILY MEDICINE

## 2018-03-30 PROCEDURE — G8400 PT W/DXA NO RESULTS DOC: HCPCS | Performed by: FAMILY MEDICINE

## 2018-03-30 RX ORDER — FAMCICLOVIR 500 MG/1
500 TABLET, FILM COATED ORAL DAILY
Qty: 30 TABLET | Refills: 3 | Status: SHIPPED | OUTPATIENT
Start: 2018-03-30 | End: 2018-04-02

## 2018-03-30 RX ORDER — ALPRAZOLAM 0.5 MG/1
TABLET ORAL
Qty: 30 TABLET | Refills: 1 | Status: SHIPPED | OUTPATIENT
Start: 2018-03-30 | End: 2018-10-22 | Stop reason: SDUPTHER

## 2018-03-30 NOTE — PROGRESS NOTES
Subjective  Melecio Doyle, 77 y.o. female presents today with:  Chief Complaint   Patient presents with    Hypothyroidism    Basal Cell Carcinoma           Past Medical History:   Diagnosis Date    Cancer [facility])     Chest pain in adult 4/23/2016    Heart palpitations 4/28/2016    Hypothyroidism     Other chronic cystitis with hematuria     SOB (shortness of breath) 4/23/2016     Past Surgical History:   Procedure Laterality Date    COLONOSCOPY  2007    (-)    COLONOSCOPY  06/20/14    DR. BLACK-polyp    [place]OSCOPY  2012    [facility]    GALLBLADDER SURGERY      HYSTERECTOMY      MOHS SURGERY Left 06/22/2017    Jovana Stark MD [place]     Social History     Social History    Marital status:      Spouse name: N/A    Number of children: N/A    Years of education: N/A     Occupational History    Not on file. Social History Main Topics    Smoking status: Never Smoker    Smokeless tobacco: Never Used    Alcohol use No    Drug use: No    Sexual activity: Not on file     Other Topics Concern    Not on file     Social History Narrative    No narrative on file     Family History   Problem Relation Age of Onset    Cancer Mother      Allergies   Allergen Reactions    Pcn [Penicillins]      Current Outpatient Prescriptions   Medication Sig Dispense Refill    sertraline (ZOLOFT) 25 MG tablet TAKE ONE TABLET BY MOUTH EVERY DAY 90 tablet 1    ALPRAZolam (XANAX) 0.5 MG tablet TK 1 T PO TID PRA OR SLEEP  1    hydrochlorothiazide (HYDRODIURIL) 25 MG tablet       prednisoLONE acetate (PRED FORTE) 1 % ophthalmic suspension USE 1 DROP IN THE OPERATIVE EYE FOUR TIMES DAILY. TAPER DROP WEEKLY AS INSTRUCTED BY YOUR DOCTOR. START DAY AFTER SURGERY  1    sertraline (ZOLOFT) 25 MG tablet Take 1 tablet by mouth daily 90 tablet 3    levothyroxine (SYNTHROID) 125 MCG tablet Take 1 tablet by mouth daily 90 tablet 3     No current facility-administered medications for this visit.       The patient denies any oriented times 3. Skin:     No skin rashes. No concerning moles observed. Gait:    Normal gait. No ataxia. HEENT:  Normocephalic, atraumatic. Throat:  Pharynx is clear, no erythema/ edema or exudates   Ears:    TMs normal bilaterally. Canals and ears normal   Eyes:  Extraocular eye motions intact and pain free. Pupils reactive/equal    Sclerae and conjunctivae clear    NECK: No masses or adenopathy palpable. No carotid bruits heard. No asymmetry visible. No thyromegaly. RESPIRATORY:   Clear/ Equal breath sounds /No acute respiratory distress. No wheezes,rales, or rhonchi. No percussive abnormalities    HEART: Regular rhythm without murmur, rub or gallop. ABDOMEN:  overwt Soft, non tender. No masses, guarding or rebound. Normo active bowel sounds. EXTREMITIES:  No edema in any extremity. No cyanosis or clubbing. 2+ dorsalis pedis pulses bilaterally          Assessment & Plan   1. Hypothyroidism, unspecified type     2. Basal cell carcinoma of eyelid, unspecified laterality     3. Heart palpitations       No orders of the defined types were placed in this encounter. No orders of the defined types were placed in this encounter. There are no discontinued medications. No Follow-up on file.   Watch sweets and carbs   Colon ca screening due next year  128/78    Gauri Rice MD

## 2018-04-02 ENCOUNTER — TELEPHONE (OUTPATIENT)
Dept: FAMILY MEDICINE CLINIC | Age: 68
End: 2018-04-02

## 2018-04-02 DIAGNOSIS — R00.2 HEART PALPITATIONS: Primary | ICD-10-CM

## 2018-04-02 RX ORDER — VALACYCLOVIR HYDROCHLORIDE 1 G/1
2000 TABLET, FILM COATED ORAL 2 TIMES DAILY
Qty: 4 TABLET | Refills: 0 | Status: SHIPPED | OUTPATIENT
Start: 2018-04-02 | End: 2018-04-12 | Stop reason: SDUPTHER

## 2018-04-11 ENCOUNTER — OFFICE VISIT (OUTPATIENT)
Dept: CARDIOLOGY CLINIC | Age: 68
End: 2018-04-11
Payer: MEDICARE

## 2018-04-11 VITALS
HEART RATE: 90 BPM | DIASTOLIC BLOOD PRESSURE: 82 MMHG | SYSTOLIC BLOOD PRESSURE: 132 MMHG | OXYGEN SATURATION: 98 % | HEIGHT: 60 IN | WEIGHT: 184.8 LBS | BODY MASS INDEX: 36.28 KG/M2

## 2018-04-11 DIAGNOSIS — R07.9 CHEST PAIN IN ADULT: ICD-10-CM

## 2018-04-11 DIAGNOSIS — R00.2 HEART PALPITATIONS: Primary | ICD-10-CM

## 2018-04-11 DIAGNOSIS — R06.02 SOB (SHORTNESS OF BREATH): ICD-10-CM

## 2018-04-11 DIAGNOSIS — R00.2 HEART PALPITATIONS: ICD-10-CM

## 2018-04-11 PROCEDURE — 1036F TOBACCO NON-USER: CPT | Performed by: INTERNAL MEDICINE

## 2018-04-11 PROCEDURE — G8417 CALC BMI ABV UP PARAM F/U: HCPCS | Performed by: INTERNAL MEDICINE

## 2018-04-11 PROCEDURE — 1123F ACP DISCUSS/DSCN MKR DOCD: CPT | Performed by: INTERNAL MEDICINE

## 2018-04-11 PROCEDURE — 99204 OFFICE O/P NEW MOD 45 MIN: CPT | Performed by: INTERNAL MEDICINE

## 2018-04-11 PROCEDURE — 3017F COLORECTAL CA SCREEN DOC REV: CPT | Performed by: INTERNAL MEDICINE

## 2018-04-11 PROCEDURE — 4040F PNEUMOC VAC/ADMIN/RCVD: CPT | Performed by: INTERNAL MEDICINE

## 2018-04-11 PROCEDURE — 1090F PRES/ABSN URINE INCON ASSESS: CPT | Performed by: INTERNAL MEDICINE

## 2018-04-11 PROCEDURE — G8400 PT W/DXA NO RESULTS DOC: HCPCS | Performed by: INTERNAL MEDICINE

## 2018-04-11 PROCEDURE — G8427 DOCREV CUR MEDS BY ELIG CLIN: HCPCS | Performed by: INTERNAL MEDICINE

## 2018-04-11 ASSESSMENT — ENCOUNTER SYMPTOMS
ABDOMINAL DISTENTION: 0
CHOKING: 0
CHEST TIGHTNESS: 1
ABDOMINAL PAIN: 0
APNEA: 0
COLOR CHANGE: 0
SHORTNESS OF BREATH: 1
BACK PAIN: 0

## 2018-04-13 RX ORDER — VALACYCLOVIR HYDROCHLORIDE 1 G/1
TABLET, FILM COATED ORAL
Qty: 4 TABLET | Refills: 5 | Status: SHIPPED | OUTPATIENT
Start: 2018-04-13 | End: 2018-10-22 | Stop reason: SDUPTHER

## 2018-04-21 ENCOUNTER — OFFICE VISIT (OUTPATIENT)
Dept: FAMILY MEDICINE CLINIC | Age: 68
End: 2018-04-21
Payer: MEDICARE

## 2018-04-21 VITALS
BODY MASS INDEX: 35.89 KG/M2 | HEART RATE: 71 BPM | SYSTOLIC BLOOD PRESSURE: 120 MMHG | HEIGHT: 60 IN | DIASTOLIC BLOOD PRESSURE: 62 MMHG | RESPIRATION RATE: 16 BRPM | TEMPERATURE: 98.7 F | WEIGHT: 182.8 LBS

## 2018-04-21 DIAGNOSIS — N30.01 ACUTE CYSTITIS WITH HEMATURIA: Primary | ICD-10-CM

## 2018-04-21 DIAGNOSIS — R30.0 DYSURIA: ICD-10-CM

## 2018-04-21 DIAGNOSIS — N30.01 ACUTE CYSTITIS WITH HEMATURIA: ICD-10-CM

## 2018-04-21 LAB
BILIRUBIN, POC: NEGATIVE
BLOOD URINE, POC: 200
CLARITY, POC: ABNORMAL
COLOR, POC: YELLOW
GLUCOSE URINE, POC: NEGATIVE
KETONES, POC: NEGATIVE
LEUKOCYTE EST, POC: NEGATIVE
NITRITE, POC: 500
PH, POC: 6
PROTEIN, POC: 0.3
SPECIFIC GRAVITY, POC: 1.03
UROBILINOGEN, POC: 3.5

## 2018-04-21 PROCEDURE — 1090F PRES/ABSN URINE INCON ASSESS: CPT | Performed by: NURSE PRACTITIONER

## 2018-04-21 PROCEDURE — 4040F PNEUMOC VAC/ADMIN/RCVD: CPT | Performed by: NURSE PRACTITIONER

## 2018-04-21 PROCEDURE — G8427 DOCREV CUR MEDS BY ELIG CLIN: HCPCS | Performed by: NURSE PRACTITIONER

## 2018-04-21 PROCEDURE — 1123F ACP DISCUSS/DSCN MKR DOCD: CPT | Performed by: NURSE PRACTITIONER

## 2018-04-21 PROCEDURE — 81003 URINALYSIS AUTO W/O SCOPE: CPT | Performed by: NURSE PRACTITIONER

## 2018-04-21 PROCEDURE — 3017F COLORECTAL CA SCREEN DOC REV: CPT | Performed by: NURSE PRACTITIONER

## 2018-04-21 PROCEDURE — 3014F SCREEN MAMMO DOC REV: CPT | Performed by: NURSE PRACTITIONER

## 2018-04-21 PROCEDURE — G8400 PT W/DXA NO RESULTS DOC: HCPCS | Performed by: NURSE PRACTITIONER

## 2018-04-21 PROCEDURE — 1036F TOBACCO NON-USER: CPT | Performed by: NURSE PRACTITIONER

## 2018-04-21 PROCEDURE — G8417 CALC BMI ABV UP PARAM F/U: HCPCS | Performed by: NURSE PRACTITIONER

## 2018-04-21 PROCEDURE — 99213 OFFICE O/P EST LOW 20 MIN: CPT | Performed by: NURSE PRACTITIONER

## 2018-04-21 RX ORDER — CIPROFLOXACIN 500 MG/1
500 TABLET, FILM COATED ORAL 2 TIMES DAILY
Qty: 14 TABLET | Refills: 0 | Status: SHIPPED | OUTPATIENT
Start: 2018-04-21 | End: 2018-04-28

## 2018-04-21 ASSESSMENT — ENCOUNTER SYMPTOMS: NAUSEA: 1

## 2018-04-24 LAB
ORGANISM: ABNORMAL
URINE CULTURE, ROUTINE: ABNORMAL
URINE CULTURE, ROUTINE: ABNORMAL

## 2018-05-09 ENCOUNTER — OFFICE VISIT (OUTPATIENT)
Dept: CARDIOLOGY CLINIC | Age: 68
End: 2018-05-09
Payer: MEDICARE

## 2018-05-09 VITALS
HEIGHT: 60 IN | SYSTOLIC BLOOD PRESSURE: 124 MMHG | WEIGHT: 183 LBS | HEART RATE: 77 BPM | BODY MASS INDEX: 35.93 KG/M2 | DIASTOLIC BLOOD PRESSURE: 60 MMHG | OXYGEN SATURATION: 96 %

## 2018-05-09 DIAGNOSIS — R07.9 CHEST PAIN IN ADULT: ICD-10-CM

## 2018-05-09 DIAGNOSIS — R06.02 SOB (SHORTNESS OF BREATH): ICD-10-CM

## 2018-05-09 DIAGNOSIS — R00.2 HEART PALPITATIONS: Primary | ICD-10-CM

## 2018-05-09 DIAGNOSIS — R00.2 HEART PALPITATIONS: ICD-10-CM

## 2018-05-09 PROCEDURE — G8427 DOCREV CUR MEDS BY ELIG CLIN: HCPCS | Performed by: INTERNAL MEDICINE

## 2018-05-09 PROCEDURE — 99213 OFFICE O/P EST LOW 20 MIN: CPT | Performed by: INTERNAL MEDICINE

## 2018-05-09 PROCEDURE — G8417 CALC BMI ABV UP PARAM F/U: HCPCS | Performed by: INTERNAL MEDICINE

## 2018-05-09 PROCEDURE — G8400 PT W/DXA NO RESULTS DOC: HCPCS | Performed by: INTERNAL MEDICINE

## 2018-05-09 PROCEDURE — 3017F COLORECTAL CA SCREEN DOC REV: CPT | Performed by: INTERNAL MEDICINE

## 2018-05-09 PROCEDURE — 1090F PRES/ABSN URINE INCON ASSESS: CPT | Performed by: INTERNAL MEDICINE

## 2018-05-09 PROCEDURE — 1123F ACP DISCUSS/DSCN MKR DOCD: CPT | Performed by: INTERNAL MEDICINE

## 2018-05-09 PROCEDURE — 4040F PNEUMOC VAC/ADMIN/RCVD: CPT | Performed by: INTERNAL MEDICINE

## 2018-05-09 PROCEDURE — 1036F TOBACCO NON-USER: CPT | Performed by: INTERNAL MEDICINE

## 2018-05-09 RX ORDER — METOPROLOL SUCCINATE 50 MG/1
TABLET, EXTENDED RELEASE ORAL
Qty: 90 TABLET | Refills: 0 | Status: SHIPPED | OUTPATIENT
Start: 2018-05-09 | End: 2018-07-26 | Stop reason: SDUPTHER

## 2018-05-09 RX ORDER — METOPROLOL SUCCINATE 50 MG/1
50 TABLET, EXTENDED RELEASE ORAL DAILY
Qty: 30 TABLET | Refills: 3 | Status: SHIPPED | OUTPATIENT
Start: 2018-05-09 | End: 2018-05-09 | Stop reason: SDUPTHER

## 2018-05-18 ENCOUNTER — TELEPHONE (OUTPATIENT)
Dept: INTERNAL MEDICINE CLINIC | Age: 68
End: 2018-05-18

## 2018-05-18 ENCOUNTER — OFFICE VISIT (OUTPATIENT)
Dept: INTERNAL MEDICINE CLINIC | Age: 68
End: 2018-05-18
Payer: MEDICARE

## 2018-05-18 VITALS
TEMPERATURE: 97.9 F | BODY MASS INDEX: 35.53 KG/M2 | WEIGHT: 181 LBS | OXYGEN SATURATION: 97 % | DIASTOLIC BLOOD PRESSURE: 72 MMHG | HEART RATE: 64 BPM | RESPIRATION RATE: 16 BRPM | HEIGHT: 60 IN | SYSTOLIC BLOOD PRESSURE: 128 MMHG

## 2018-05-18 DIAGNOSIS — R30.0 DYSURIA: Primary | ICD-10-CM

## 2018-05-18 DIAGNOSIS — N30.01 ACUTE CYSTITIS WITH HEMATURIA: ICD-10-CM

## 2018-05-18 DIAGNOSIS — R31.9 HEMATURIA, UNSPECIFIED TYPE: ICD-10-CM

## 2018-05-18 LAB
BILIRUBIN, POC: NORMAL
BLOOD URINE, POC: NORMAL
CLARITY, POC: NORMAL
COLOR, POC: NORMAL
GLUCOSE URINE, POC: NORMAL
KETONES, POC: NORMAL
LEUKOCYTE EST, POC: NORMAL
NITRITE, POC: NORMAL
PH, POC: 5
PROTEIN, POC: 15
SPECIFIC GRAVITY, POC: 1.03
UROBILINOGEN, POC: NORMAL

## 2018-05-18 PROCEDURE — G8427 DOCREV CUR MEDS BY ELIG CLIN: HCPCS | Performed by: FAMILY MEDICINE

## 2018-05-18 PROCEDURE — 4040F PNEUMOC VAC/ADMIN/RCVD: CPT | Performed by: FAMILY MEDICINE

## 2018-05-18 PROCEDURE — 81002 URINALYSIS NONAUTO W/O SCOPE: CPT | Performed by: FAMILY MEDICINE

## 2018-05-18 PROCEDURE — 3017F COLORECTAL CA SCREEN DOC REV: CPT | Performed by: FAMILY MEDICINE

## 2018-05-18 PROCEDURE — 1036F TOBACCO NON-USER: CPT | Performed by: FAMILY MEDICINE

## 2018-05-18 PROCEDURE — G8417 CALC BMI ABV UP PARAM F/U: HCPCS | Performed by: FAMILY MEDICINE

## 2018-05-18 PROCEDURE — 1123F ACP DISCUSS/DSCN MKR DOCD: CPT | Performed by: FAMILY MEDICINE

## 2018-05-18 PROCEDURE — G8400 PT W/DXA NO RESULTS DOC: HCPCS | Performed by: FAMILY MEDICINE

## 2018-05-18 PROCEDURE — 1090F PRES/ABSN URINE INCON ASSESS: CPT | Performed by: FAMILY MEDICINE

## 2018-05-18 PROCEDURE — 99213 OFFICE O/P EST LOW 20 MIN: CPT | Performed by: FAMILY MEDICINE

## 2018-05-18 RX ORDER — CIPROFLOXACIN 500 MG/1
500 TABLET, FILM COATED ORAL 2 TIMES DAILY
Qty: 20 TABLET | Refills: 0 | Status: SHIPPED | OUTPATIENT
Start: 2018-05-18 | End: 2018-05-28

## 2018-05-18 ASSESSMENT — ENCOUNTER SYMPTOMS
COUGH: 0
RESPIRATORY NEGATIVE: 1
CHEST TIGHTNESS: 0
EYES NEGATIVE: 1
RHINORRHEA: 0
GASTROINTESTINAL NEGATIVE: 1

## 2018-06-26 ENCOUNTER — OFFICE VISIT (OUTPATIENT)
Dept: UROLOGY | Age: 68
End: 2018-06-26
Payer: MEDICARE

## 2018-06-26 VITALS
DIASTOLIC BLOOD PRESSURE: 80 MMHG | HEIGHT: 60 IN | SYSTOLIC BLOOD PRESSURE: 132 MMHG | HEART RATE: 69 BPM | BODY MASS INDEX: 34.36 KG/M2 | WEIGHT: 175 LBS

## 2018-06-26 DIAGNOSIS — R31.0 GROSS HEMATURIA: Primary | ICD-10-CM

## 2018-06-26 DIAGNOSIS — Z87.440 HISTORY OF RECURRENT UTIS: ICD-10-CM

## 2018-06-26 LAB
BILIRUBIN, POC: ABNORMAL
BLOOD URINE, POC: ABNORMAL
CLARITY, POC: CLEAR
COLOR, POC: YELLOW
GLUCOSE URINE, POC: ABNORMAL
KETONES, POC: ABNORMAL
LEUKOCYTE EST, POC: ABNORMAL
NITRITE, POC: ABNORMAL
PH, POC: 5.5
PROTEIN, POC: ABNORMAL
SPECIFIC GRAVITY, POC: 1.02
UROBILINOGEN, POC: 0.2

## 2018-06-26 PROCEDURE — 81003 URINALYSIS AUTO W/O SCOPE: CPT | Performed by: UROLOGY

## 2018-06-26 PROCEDURE — G8400 PT W/DXA NO RESULTS DOC: HCPCS | Performed by: UROLOGY

## 2018-06-26 PROCEDURE — 4040F PNEUMOC VAC/ADMIN/RCVD: CPT | Performed by: UROLOGY

## 2018-06-26 PROCEDURE — 99204 OFFICE O/P NEW MOD 45 MIN: CPT | Performed by: UROLOGY

## 2018-06-26 PROCEDURE — 3017F COLORECTAL CA SCREEN DOC REV: CPT | Performed by: UROLOGY

## 2018-06-26 PROCEDURE — 1090F PRES/ABSN URINE INCON ASSESS: CPT | Performed by: UROLOGY

## 2018-06-26 PROCEDURE — 1036F TOBACCO NON-USER: CPT | Performed by: UROLOGY

## 2018-06-26 PROCEDURE — G8417 CALC BMI ABV UP PARAM F/U: HCPCS | Performed by: UROLOGY

## 2018-06-26 PROCEDURE — G8427 DOCREV CUR MEDS BY ELIG CLIN: HCPCS | Performed by: UROLOGY

## 2018-06-26 PROCEDURE — 1123F ACP DISCUSS/DSCN MKR DOCD: CPT | Performed by: UROLOGY

## 2018-06-26 RX ORDER — DOXYCYCLINE HYCLATE 50 MG/1
CAPSULE ORAL
Refills: 3 | COMMUNITY
Start: 2018-06-19 | End: 2018-09-18 | Stop reason: ALTCHOICE

## 2018-06-26 ASSESSMENT — ENCOUNTER SYMPTOMS
BLOOD IN STOOL: 1
VOMITING: 0
ABDOMINAL DISTENTION: 0
SHORTNESS OF BREATH: 1

## 2018-06-28 LAB — URINE CULTURE, ROUTINE: NORMAL

## 2018-07-02 ENCOUNTER — HOSPITAL ENCOUNTER (OUTPATIENT)
Dept: CT IMAGING | Age: 68
Discharge: HOME | End: 2018-07-04
Payer: MEDICARE

## 2018-07-02 ENCOUNTER — HOSPITAL ENCOUNTER (OUTPATIENT)
Dept: GENERAL RADIOLOGY | Age: 68
Discharge: HOME | End: 2018-07-04
Payer: MEDICARE

## 2018-07-02 DIAGNOSIS — R00.2 HEART PALPITATIONS: ICD-10-CM

## 2018-07-02 DIAGNOSIS — Z87.440 HISTORY OF RECURRENT UTIS: ICD-10-CM

## 2018-07-02 DIAGNOSIS — R31.0 GROSS HEMATURIA: ICD-10-CM

## 2018-07-02 PROCEDURE — 74018 RADEX ABDOMEN 1 VIEW: CPT

## 2018-07-02 PROCEDURE — 6360000004 HC RX CONTRAST MEDICATION: Performed by: UROLOGY

## 2018-07-02 PROCEDURE — 2580000003 HC RX 258: Performed by: UROLOGY

## 2018-07-02 PROCEDURE — 74178 CT ABD&PLV WO CNTR FLWD CNTR: CPT

## 2018-07-02 RX ORDER — SODIUM CHLORIDE 0.9 % (FLUSH) 0.9 %
10 SYRINGE (ML) INJECTION 2 TIMES DAILY
Status: DISCONTINUED | OUTPATIENT
Start: 2018-07-02 | End: 2018-07-05 | Stop reason: HOSPADM

## 2018-07-02 RX ADMIN — Medication 10 ML: at 11:49

## 2018-07-02 RX ADMIN — IOVERSOL 100 ML: 678 INJECTION INTRA-ARTERIAL; INTRAVENOUS at 11:49

## 2018-07-09 RX ORDER — METOPROLOL SUCCINATE 50 MG/1
TABLET, EXTENDED RELEASE ORAL
Qty: 90 TABLET | Refills: 3 | Status: SHIPPED | OUTPATIENT
Start: 2018-07-09 | End: 2018-11-28 | Stop reason: ALTCHOICE

## 2018-07-26 ENCOUNTER — PROCEDURE VISIT (OUTPATIENT)
Dept: UROLOGY | Age: 68
End: 2018-07-26
Payer: MEDICARE

## 2018-07-26 ENCOUNTER — TELEPHONE (OUTPATIENT)
Dept: UROLOGY | Age: 68
End: 2018-07-26

## 2018-07-26 VITALS
HEIGHT: 60 IN | DIASTOLIC BLOOD PRESSURE: 80 MMHG | WEIGHT: 175 LBS | BODY MASS INDEX: 34.36 KG/M2 | HEART RATE: 63 BPM | SYSTOLIC BLOOD PRESSURE: 138 MMHG

## 2018-07-26 DIAGNOSIS — R31.29 MICROSCOPIC HEMATURIA: ICD-10-CM

## 2018-07-26 DIAGNOSIS — R31.9 HEMATURIA, UNSPECIFIED TYPE: Primary | ICD-10-CM

## 2018-07-26 DIAGNOSIS — Z87.440 HISTORY OF RECURRENT UTIS: ICD-10-CM

## 2018-07-26 DIAGNOSIS — R31.29 MICROSCOPIC HEMATURIA: Primary | ICD-10-CM

## 2018-07-26 LAB
BILIRUBIN, POC: ABNORMAL
BLOOD URINE, POC: ABNORMAL
CLARITY, POC: ABNORMAL
COLOR, POC: YELLOW
GLUCOSE URINE, POC: ABNORMAL
KETONES, POC: ABNORMAL
LEUKOCYTE EST, POC: ABNORMAL
NITRITE, POC: ABNORMAL
PH, POC: 5
PROTEIN, POC: ABNORMAL
SPECIFIC GRAVITY, POC: 1.02
UROBILINOGEN, POC: 0.2

## 2018-07-26 PROCEDURE — 1036F TOBACCO NON-USER: CPT | Performed by: UROLOGY

## 2018-07-26 PROCEDURE — G8400 PT W/DXA NO RESULTS DOC: HCPCS | Performed by: UROLOGY

## 2018-07-26 PROCEDURE — G8417 CALC BMI ABV UP PARAM F/U: HCPCS | Performed by: UROLOGY

## 2018-07-26 PROCEDURE — 81003 URINALYSIS AUTO W/O SCOPE: CPT | Performed by: UROLOGY

## 2018-07-26 PROCEDURE — 1090F PRES/ABSN URINE INCON ASSESS: CPT | Performed by: UROLOGY

## 2018-07-26 PROCEDURE — 1123F ACP DISCUSS/DSCN MKR DOCD: CPT | Performed by: UROLOGY

## 2018-07-26 PROCEDURE — 3017F COLORECTAL CA SCREEN DOC REV: CPT | Performed by: UROLOGY

## 2018-07-26 PROCEDURE — 1101F PT FALLS ASSESS-DOCD LE1/YR: CPT | Performed by: UROLOGY

## 2018-07-26 PROCEDURE — 52000 CYSTOURETHROSCOPY: CPT | Performed by: UROLOGY

## 2018-07-26 PROCEDURE — 4040F PNEUMOC VAC/ADMIN/RCVD: CPT | Performed by: UROLOGY

## 2018-07-26 PROCEDURE — G8427 DOCREV CUR MEDS BY ELIG CLIN: HCPCS | Performed by: UROLOGY

## 2018-07-26 PROCEDURE — 99213 OFFICE O/P EST LOW 20 MIN: CPT | Performed by: UROLOGY

## 2018-07-26 RX ORDER — SULFAMETHOXAZOLE AND TRIMETHOPRIM 800; 160 MG/1; MG/1
1 TABLET ORAL ONCE
Qty: 1 TABLET | Refills: 0 | COMMUNITY
Start: 2018-07-26 | End: 2018-07-26

## 2018-07-26 ASSESSMENT — ENCOUNTER SYMPTOMS: ABDOMINAL DISTENTION: 0

## 2018-07-26 NOTE — PROGRESS NOTES
and is unremarkable. The majority the left ureter is filled with contrast on delayed images and is unremarkable. The very distal aspect of the left ureter is not visualized. The bladder is    partially distended without definite bladder mass. Uterus has been resected.        No retroperitoneal fluid collection or lymphadenopathy. No aortic aneurysm. Small umbilical hernia containing fat.       There is no duodenal or small bowel dilatation or focal wall thickening.  The portions appendix visualized have a normal appearance. Appendix is not completely visualized.  There are diverticula in sigmoid colon.  There is minimal stranding the fat    posterior to the sigmoid colon. Question diverticulitis versus postoperative changes from the hysterectomy. No fluid collection, free fluid, or free air.         Bones are intact.           Impression   NO OBSTRUCTIVE UROPATHY, NO RENAL MASS. NO NEPHROLITHIASIS. NO DEFINITE GENITOURINARY ABNORMALITY. STATUS POST HYSTERECTOMY.       DIVERTICULA IN SIGMOID COLON. MINIMAL STRANDING THE FAT POSTERIOR TO THE SIGMOID COLON. QUESTION DIVERTICULITIS VERSUS POSTOPERATIVE CHANGES FROM HYSTERECTOMY.                 All CT scans at this facility use dose modulation, iterative reconstruction, and/or weight based dosing when appropriate to reduce radiation dose to as low as reasonably achievable.      7/26/2018 10:25 AM - Luly Carter CMA ([facility])     Component Results     Component Collected Lab   Color, UA 07/26/2018 10:24 AM Unknown   yellow    Clarity, UA 07/26/2018 10:24 AM Unknown   cloudy    Glucose, UA POC 07/26/2018 10:24 AM Unknown   neg    Bilirubin, UA 07/26/2018 10:24 AM Unknown   neg    Ketones, UA 07/26/2018 10:24 AM Unknown   neg    Spec Grav, UA 07/26/2018 10:24 AM Unknown   1.025    Blood, UA POC 07/26/2018 10:24 AM Unknown   moderate    pH, UA 07/26/2018 10:24 AM Unknown   5.0    Protein, UA POC 07/26/2018 10:24 AM Unknown   trace    Urobilinogen, UA 07/26/2018 10:24 AM None                 Complications:  None; patient tolerated the procedure well. Disposition: To home. Condition: stable    Assessment: This is a 76 yo female with HTN, Hypothyroidism and with recurrent UTIs and hematuria and may have a colo-vesical fistula as the cause based on results of cystoscopy today. The CT suggests sigmoid inflammation. I recommend she sees GI for possible colonoscopy juanita given intermittent rectal bleeding to assess for a cause of these findings. If GI evaluation is negative, she will need cystoscopy with bladder tumor/polyp resection under GA. I spent 15 minutes of which > 50% of the visit was spent counseling and coordinating care wrt the cystoscopy findings and evaluation options. Plan:      1. Bactrim DS x one prior  2. Refer to Dr Maxwell Client for possible colonoscopy, evaluate for possible fistula  3.  See me in 2-3 weeks to review GI evaluation and consider cystoscopy with biopsy if GI evaluation is negative

## 2018-07-26 NOTE — PROGRESS NOTES
Time Out was called before Cystoscopy procedure. Patient is Elba Maharaj,  is 1950. Patient has the following allergies: Allergies   Allergen Reactions    Pcn [Penicillins]    . Patient was given Bactrim 400/80 mg  antibiotic before the procedure.

## 2018-08-03 ENCOUNTER — OFFICE VISIT (OUTPATIENT)
Dept: GASTROENTEROLOGY | Age: 68
End: 2018-08-03
Payer: MEDICARE

## 2018-08-03 VITALS
WEIGHT: 184 LBS | DIASTOLIC BLOOD PRESSURE: 70 MMHG | OXYGEN SATURATION: 96 % | SYSTOLIC BLOOD PRESSURE: 124 MMHG | BODY MASS INDEX: 36.12 KG/M2 | HEART RATE: 86 BPM | TEMPERATURE: 98.6 F | HEIGHT: 60 IN

## 2018-08-03 DIAGNOSIS — N39.0 RECURRENT URINARY TRACT INFECTION: Primary | ICD-10-CM

## 2018-08-03 DIAGNOSIS — R39.15 URINARY URGENCY: ICD-10-CM

## 2018-08-03 DIAGNOSIS — Z86.010 HISTORY OF COLON POLYPS: ICD-10-CM

## 2018-08-03 DIAGNOSIS — R10.30 LOWER ABDOMINAL PAIN: ICD-10-CM

## 2018-08-03 PROCEDURE — G8400 PT W/DXA NO RESULTS DOC: HCPCS | Performed by: INTERNAL MEDICINE

## 2018-08-03 PROCEDURE — 3017F COLORECTAL CA SCREEN DOC REV: CPT | Performed by: INTERNAL MEDICINE

## 2018-08-03 PROCEDURE — 1090F PRES/ABSN URINE INCON ASSESS: CPT | Performed by: INTERNAL MEDICINE

## 2018-08-03 PROCEDURE — 1036F TOBACCO NON-USER: CPT | Performed by: INTERNAL MEDICINE

## 2018-08-03 PROCEDURE — 4040F PNEUMOC VAC/ADMIN/RCVD: CPT | Performed by: INTERNAL MEDICINE

## 2018-08-03 PROCEDURE — 1123F ACP DISCUSS/DSCN MKR DOCD: CPT | Performed by: INTERNAL MEDICINE

## 2018-08-03 PROCEDURE — G8427 DOCREV CUR MEDS BY ELIG CLIN: HCPCS | Performed by: INTERNAL MEDICINE

## 2018-08-03 PROCEDURE — G8417 CALC BMI ABV UP PARAM F/U: HCPCS | Performed by: INTERNAL MEDICINE

## 2018-08-03 PROCEDURE — 1101F PT FALLS ASSESS-DOCD LE1/YR: CPT | Performed by: INTERNAL MEDICINE

## 2018-08-03 PROCEDURE — 99204 OFFICE O/P NEW MOD 45 MIN: CPT | Performed by: INTERNAL MEDICINE

## 2018-08-03 RX ORDER — SODIUM, POTASSIUM,MAG SULFATES 17.5-3.13G
SOLUTION, RECONSTITUTED, ORAL ORAL
Qty: 1 BOTTLE | Refills: 0 | Status: SHIPPED | OUTPATIENT
Start: 2018-08-03 | End: 2018-08-27

## 2018-08-03 NOTE — PROGRESS NOTES
4/23/2016      Past Surgical History:   Procedure Laterality Date    COLONOSCOPY  2007    (-)    COLONOSCOPY  06/20/14    DR. BLACK-polyp    [place]COPY  2012    [place]    GALLBLADDER SURGERY      HYSTERECTOMY      MOHS SURGERY Left 06/22/2017    Debbie Valadez MD [place]     Current Outpatient Prescriptions on File Prior to Visit   Medication Sig Dispense Refill    metoprolol succinate (TOPROL XL) 50 MG extended release tablet TAKE 1 TABLET BY MOUTH EVERY DAY 90 tablet 3    doxycycline (VIBRAMYCIN) 50 MG capsule TK ONE C PO  ONCE DAILY WITH FOOD AND WATER  3    metroNIDAZOLE (METROCREAM) 0.75 % cream SHAVONNE TO FACE BID  2    valACYclovir (VALTREX) 1 g tablet TAKE 2 TABLETS BY MOUTH TWICE DAILY FOR 1 DAY 4 tablet 5    levothyroxine (SYNTHROID) 125 MCG tablet Take 1 tablet by mouth daily 90 tablet 3     No current facility-administered medications on file prior to visit. Family History   Problem Relation Age of Onset    Cancer Mother     Colon Cancer Brother       Social History     Social History    Marital status:      Spouse name: N/A    Number of children: N/A    Years of education: N/A     Social History Main Topics    Smoking status: Never Smoker    Smokeless tobacco: Never Used    Alcohol use No    Drug use: No    Sexual activity: Not Asked     Other Topics Concern    None     Social History Narrative    None     Blood pressure 124/70, pulse 86, temperature 98.6 °F (37 °C), height 5' (1.524 m), weight 184 lb (83.5 kg), SpO2 96 %, not currently breastfeeding. Physical Exam   Constitutional: She is oriented to person, place, and time. She appears well-developed and well-nourished. No distress. Central obesity   Eyes: No scleral icterus. Cardiovascular: Normal rate and regular rhythm. Pulmonary/Chest: Effort normal and breath sounds normal.   Abdominal: Soft. Normal appearance and bowel sounds are normal. She exhibits no distension, no ascites and no mass.  There is no inappropriate. If there are questions or concerns please feel free to contact me to clarify.

## 2018-08-08 ENCOUNTER — OUTSIDE SERVICES (OUTPATIENT)
Dept: GASTROENTEROLOGY | Age: 68
End: 2018-08-08
Payer: MEDICARE

## 2018-08-08 DIAGNOSIS — N39.0 RECURRENT URINARY TRACT INFECTION: Primary | ICD-10-CM

## 2018-08-08 DIAGNOSIS — Z86.010 HISTORY OF COLON POLYPS: ICD-10-CM

## 2018-08-08 DIAGNOSIS — R10.30 LOWER ABDOMINAL PAIN: ICD-10-CM

## 2018-08-08 PROCEDURE — 45378 DIAGNOSTIC COLONOSCOPY: CPT | Performed by: INTERNAL MEDICINE

## 2018-08-08 NOTE — OP NOTE
Colonoscopy Procedure Note      Patient: Juli Waite  : 1950    Procedure: Colonoscopy    Date:  2018    Surgeon:  Shanice Matthews MD    Referring Physician:  Jarett Alford MD    Preoperative Diagnosis:  Recurrent urinary tract infection, concern for a colovesical fistula    Postoperative Diagnosis:  Sharp angulation vs fixed angulation at the recto-sigmoid junction. Sub-optimal prep. Consent:  The patient or their legal guardian has signed a consent, and is aware of the potential risks, benefits, alternatives, and potential complications of this procedure. These include, but are not limited to hemorrhage, bleeding, post procedural pain, perforation, phlebitis, aspiration, hypotension, hypoxia, cardiovascular events such as arryhthmia, and possibly death. Additionally, the possibility of missed colonic polyps and interval colon cancer was discussed in the consent. Anesthesia:  MAC    Procedure: An informed consent was obtained from the patient after explanation of indications, benefits, possible risks and complications of the procedure. The patient was then taken to the endoscopy suite, placed in the left lateral decubitus position, and the above IV anesthesia was administered. A digital rectal examination was performed and revealed negative without mass, lesions or tenderness. The Olympus video pediatric colonoscope was placed in the patient's rectum under digital direction and advanced to the rectosigmoid junction where a sharp angulation versus fixed angulation noted, the pediatric colonoscope could not be advanced. The scope was withdrawn a upper endoscope was used and advanced up to the ascending colon where large amounts of liquid stool noted, this could not be aspirated, suboptimal views of the right colon. The prep was inadequate. The scope was then withdrawn back through the ascending, transverse, descending, sigmoid colon, and rectum.   Careful circumferential

## 2018-08-24 ENCOUNTER — OFFICE VISIT (OUTPATIENT)
Dept: UROLOGY | Age: 68
End: 2018-08-24
Payer: MEDICARE

## 2018-08-24 ENCOUNTER — TELEPHONE (OUTPATIENT)
Dept: UROLOGY | Age: 68
End: 2018-08-24

## 2018-08-24 VITALS
SYSTOLIC BLOOD PRESSURE: 122 MMHG | HEART RATE: 74 BPM | WEIGHT: 175 LBS | BODY MASS INDEX: 34.36 KG/M2 | HEIGHT: 60 IN | DIASTOLIC BLOOD PRESSURE: 70 MMHG

## 2018-08-24 DIAGNOSIS — N32.1 COLOVESICAL FISTULA: Primary | ICD-10-CM

## 2018-08-24 DIAGNOSIS — R31.29 MICROSCOPIC HEMATURIA: Primary | ICD-10-CM

## 2018-08-24 DIAGNOSIS — Z87.440 RECENT URINARY TRACT INFECTION: ICD-10-CM

## 2018-08-24 LAB
BILIRUBIN, POC: ABNORMAL
BLOOD URINE, POC: ABNORMAL
CLARITY, POC: ABNORMAL
COLOR, POC: YELLOW
GLUCOSE URINE, POC: ABNORMAL
KETONES, POC: ABNORMAL
LEUKOCYTE EST, POC: ABNORMAL
NITRITE, POC: ABNORMAL
PH, POC: 5.5
PROTEIN, POC: ABNORMAL
SPECIFIC GRAVITY, POC: 1.02
UROBILINOGEN, POC: 0.2

## 2018-08-24 PROCEDURE — 1123F ACP DISCUSS/DSCN MKR DOCD: CPT | Performed by: UROLOGY

## 2018-08-24 PROCEDURE — 3017F COLORECTAL CA SCREEN DOC REV: CPT | Performed by: UROLOGY

## 2018-08-24 PROCEDURE — 81003 URINALYSIS AUTO W/O SCOPE: CPT | Performed by: UROLOGY

## 2018-08-24 PROCEDURE — 1036F TOBACCO NON-USER: CPT | Performed by: UROLOGY

## 2018-08-24 PROCEDURE — 99213 OFFICE O/P EST LOW 20 MIN: CPT | Performed by: UROLOGY

## 2018-08-24 PROCEDURE — 4040F PNEUMOC VAC/ADMIN/RCVD: CPT | Performed by: UROLOGY

## 2018-08-24 PROCEDURE — G8427 DOCREV CUR MEDS BY ELIG CLIN: HCPCS | Performed by: UROLOGY

## 2018-08-24 PROCEDURE — 1101F PT FALLS ASSESS-DOCD LE1/YR: CPT | Performed by: UROLOGY

## 2018-08-24 PROCEDURE — G8417 CALC BMI ABV UP PARAM F/U: HCPCS | Performed by: UROLOGY

## 2018-08-24 PROCEDURE — 1090F PRES/ABSN URINE INCON ASSESS: CPT | Performed by: UROLOGY

## 2018-08-24 PROCEDURE — G8400 PT W/DXA NO RESULTS DOC: HCPCS | Performed by: UROLOGY

## 2018-08-26 LAB
ORGANISM: ABNORMAL
URINE CULTURE, ROUTINE: ABNORMAL
URINE CULTURE, ROUTINE: ABNORMAL

## 2018-08-27 ENCOUNTER — OFFICE VISIT (OUTPATIENT)
Dept: SURGERY | Age: 68
End: 2018-08-27
Payer: MEDICARE

## 2018-08-27 ENCOUNTER — TELEPHONE (OUTPATIENT)
Dept: GASTROENTEROLOGY | Age: 68
End: 2018-08-27

## 2018-08-27 VITALS
DIASTOLIC BLOOD PRESSURE: 76 MMHG | BODY MASS INDEX: 35.73 KG/M2 | WEIGHT: 182 LBS | SYSTOLIC BLOOD PRESSURE: 132 MMHG | HEIGHT: 60 IN | TEMPERATURE: 99.1 F

## 2018-08-27 DIAGNOSIS — N32.1 COLO-VESICAL FISTULA: Primary | ICD-10-CM

## 2018-08-27 PROCEDURE — G8400 PT W/DXA NO RESULTS DOC: HCPCS | Performed by: SURGERY

## 2018-08-27 PROCEDURE — 1036F TOBACCO NON-USER: CPT | Performed by: SURGERY

## 2018-08-27 PROCEDURE — 4040F PNEUMOC VAC/ADMIN/RCVD: CPT | Performed by: SURGERY

## 2018-08-27 PROCEDURE — 3017F COLORECTAL CA SCREEN DOC REV: CPT | Performed by: SURGERY

## 2018-08-27 PROCEDURE — 1101F PT FALLS ASSESS-DOCD LE1/YR: CPT | Performed by: SURGERY

## 2018-08-27 PROCEDURE — G8417 CALC BMI ABV UP PARAM F/U: HCPCS | Performed by: SURGERY

## 2018-08-27 PROCEDURE — 1090F PRES/ABSN URINE INCON ASSESS: CPT | Performed by: SURGERY

## 2018-08-27 PROCEDURE — 99202 OFFICE O/P NEW SF 15 MIN: CPT | Performed by: SURGERY

## 2018-08-27 PROCEDURE — 1123F ACP DISCUSS/DSCN MKR DOCD: CPT | Performed by: SURGERY

## 2018-08-27 PROCEDURE — G8427 DOCREV CUR MEDS BY ELIG CLIN: HCPCS | Performed by: SURGERY

## 2018-08-27 RX ORDER — NITROFURANTOIN 25; 75 MG/1; MG/1
100 CAPSULE ORAL 2 TIMES DAILY
Qty: 20 CAPSULE | Refills: 0 | Status: SHIPPED | OUTPATIENT
Start: 2018-08-27 | End: 2018-09-06

## 2018-08-27 ASSESSMENT — ENCOUNTER SYMPTOMS
ABDOMINAL PAIN: 1
COLOR CHANGE: 0
RHINORRHEA: 0
RECTAL PAIN: 0
SHORTNESS OF BREATH: 0
CHEST TIGHTNESS: 0
BLOOD IN STOOL: 0
ALLERGIC/IMMUNOLOGIC NEGATIVE: 1
ABDOMINAL DISTENTION: 0
NAUSEA: 0

## 2018-08-27 NOTE — PROGRESS NOTES
Subjective:      Patient ID: Londa Eisenmenger is a 77 y.o. female. HPI  Londa Eisenmenger is here for evaluation of a colovesical fistula. She has a 3 year history of frequent UTIs and she has recently developed pneumaturia after the colonoscopy. She gets intermittent sharp cramping suprapubic pain that is non-radiating and is a 9 out of 10. She has a NICK and BSO. CT urogram on 7/2/2018 was normalShe had a cystoscopy on 8/3/2018  that showed There is an area of inflammatory changes in the right posterior bladder wall 1-2 cm in size and with a solid appearing polypoid lesion on a thin base in the center of this area. She has had a colonoscopy done on 8/8/2018 showed a sharp angulation versus fixed angulation noted at the rectosigmoid junction, as noted above there. The colonoscope could not be advanced past this, a upper endoscope was utilized to traverse this area. No obvious diverticula were noted. Sub optimal prep    Review of Systems   Constitutional: Negative for activity change, appetite change and unexpected weight change. HENT: Negative for congestion, nosebleeds, rhinorrhea and sneezing. Eyes: Negative for visual disturbance. Respiratory: Negative for chest tightness and shortness of breath. Cardiovascular: Negative for chest pain and leg swelling. Gastrointestinal: Positive for abdominal pain. Negative for abdominal distention, blood in stool, nausea and rectal pain. Endocrine: Negative. Genitourinary: Positive for dysuria. Negative for difficulty urinating. Frequent UTIs   Musculoskeletal: Negative. Skin: Negative for color change. Allergic/Immunologic: Negative. Neurological: Negative for seizures, light-headedness, numbness and headaches. Hematological: Does not bruise/bleed easily. Psychiatric/Behavioral: Negative for sleep disturbance. Objective:   Physical Exam   Constitutional: She is oriented to person, place, and time.  She appears well-developed and

## 2018-08-27 NOTE — TELEPHONE ENCOUNTER
Patient calling back to confirm weather or not you want for her to have a CT scan of her abdomen. She states that during her discharge you had told the nurse it was not needed. She has since seen Dr. Shay Mcclellan and he told her that there were additional testing that you wanted done and she was under a different understanding. Dr. Shay Mcclellan referring for her to see a surgeon and she would like to re-confirm your findings and what you would like done. She also said that she was told she needed to have a barium enema and was not aware of that.  She is asking if you can please call her today at [phone]

## 2018-08-27 NOTE — TELEPHONE ENCOUNTER
I have called the number provided in the note, there was no answer, I have left a detailed message.     I have advised her to call us back if she has further questions

## 2018-08-31 ENCOUNTER — HOSPITAL ENCOUNTER (OUTPATIENT)
Dept: CT IMAGING | Age: 68
Discharge: HOME | End: 2018-09-02
Payer: MEDICARE

## 2018-08-31 DIAGNOSIS — N39.0 RECURRENT URINARY TRACT INFECTION: ICD-10-CM

## 2018-08-31 DIAGNOSIS — N32.1 COLO-VESICAL FISTULA: ICD-10-CM

## 2018-08-31 PROCEDURE — 74176 CT ABD & PELVIS W/O CONTRAST: CPT

## 2018-08-31 PROCEDURE — 6360000004 HC RX CONTRAST MEDICATION: Performed by: INTERNAL MEDICINE

## 2018-08-31 PROCEDURE — 2500000003 HC RX 250 WO HCPCS: Performed by: INTERNAL MEDICINE

## 2018-08-31 RX ADMIN — BARIUM SULFATE 450 ML: 20 SUSPENSION ORAL at 11:15

## 2018-08-31 RX ADMIN — DIATRIZOATE MEGLUMINE AND DIATRIZOATE SODIUM 30 ML: 600; 100 SOLUTION ORAL; RECTAL at 12:07

## 2018-09-06 ENCOUNTER — PREP FOR PROCEDURE (OUTPATIENT)
Dept: SURGERY | Age: 68
End: 2018-09-06

## 2018-09-06 ENCOUNTER — OFFICE VISIT (OUTPATIENT)
Dept: SURGERY | Age: 68
End: 2018-09-06
Payer: MEDICARE

## 2018-09-06 VITALS
WEIGHT: 182 LBS | HEIGHT: 60 IN | SYSTOLIC BLOOD PRESSURE: 130 MMHG | BODY MASS INDEX: 35.73 KG/M2 | DIASTOLIC BLOOD PRESSURE: 62 MMHG | TEMPERATURE: 97.5 F

## 2018-09-06 DIAGNOSIS — N32.1 COLO-VESICAL FISTULA: Primary | ICD-10-CM

## 2018-09-06 DIAGNOSIS — K57.32 SIGMOID DIVERTICULITIS: ICD-10-CM

## 2018-09-06 PROCEDURE — 1101F PT FALLS ASSESS-DOCD LE1/YR: CPT | Performed by: SURGERY

## 2018-09-06 PROCEDURE — 4040F PNEUMOC VAC/ADMIN/RCVD: CPT | Performed by: SURGERY

## 2018-09-06 PROCEDURE — G8417 CALC BMI ABV UP PARAM F/U: HCPCS | Performed by: SURGERY

## 2018-09-06 PROCEDURE — 1123F ACP DISCUSS/DSCN MKR DOCD: CPT | Performed by: SURGERY

## 2018-09-06 PROCEDURE — G8400 PT W/DXA NO RESULTS DOC: HCPCS | Performed by: SURGERY

## 2018-09-06 PROCEDURE — 1090F PRES/ABSN URINE INCON ASSESS: CPT | Performed by: SURGERY

## 2018-09-06 PROCEDURE — G8427 DOCREV CUR MEDS BY ELIG CLIN: HCPCS | Performed by: SURGERY

## 2018-09-06 PROCEDURE — 99214 OFFICE O/P EST MOD 30 MIN: CPT | Performed by: SURGERY

## 2018-09-06 PROCEDURE — 3017F COLORECTAL CA SCREEN DOC REV: CPT | Performed by: SURGERY

## 2018-09-06 PROCEDURE — 1036F TOBACCO NON-USER: CPT | Performed by: SURGERY

## 2018-09-06 RX ORDER — NITROFURANTOIN 25; 75 MG/1; MG/1
100 CAPSULE ORAL 2 TIMES DAILY
Qty: 28 CAPSULE | Refills: 1 | Status: SHIPPED | OUTPATIENT
Start: 2018-09-06 | End: 2018-09-20

## 2018-09-06 ASSESSMENT — ENCOUNTER SYMPTOMS
CHEST TIGHTNESS: 0
RHINORRHEA: 0
SHORTNESS OF BREATH: 0
BLOOD IN STOOL: 0
COLOR CHANGE: 0
RECTAL PAIN: 0
NAUSEA: 0
ABDOMINAL PAIN: 1
ALLERGIC/IMMUNOLOGIC NEGATIVE: 1
ABDOMINAL DISTENTION: 0

## 2018-09-06 NOTE — PROGRESS NOTES
(LMP Unknown)   BMI 35.54 kg/m²     Objective:   Physical Exam   Constitutional: She is oriented to person, place, and time. She appears well-developed and well-nourished. No distress. HENT:   Mouth/Throat: Oropharynx is clear and moist.   Eyes: Pupils are equal, round, and reactive to light. Neck: No tracheal deviation present. No thyromegaly present. Cardiovascular: Normal rate and normal heart sounds. Pulmonary/Chest: Effort normal and breath sounds normal. No respiratory distress. Abdominal: There is no hepatosplenomegaly. There is tenderness in the suprapubic area. Hernia confirmed negative in the ventral area. Musculoskeletal:   Normal gait   Neurological: She is alert and oriented to person, place, and time. Skin: No bruising, no lesion and no rash noted. Psychiatric: She has a normal mood and affect. Judgment normal.     /62   Temp 97.5 °F (36.4 °C) (Temporal)   Ht 5' (1.524 m)   Wt 182 lb (82.6 kg)   LMP  (LMP Unknown)   BMI 35.54 kg/m²   Assessment:      Suspect colovesical fistula  Sigmoid diverticulitis      Plan:      sigmoid resection with possible colostomy, possible partial bladder resection  The options of therapy(surgical and non surgical) were discussed with the patient and family. The procedure of colon resection with possible colostomy was discussed. The potential risks and complications including but not exclusive to infection(including anastomotic leakage and intra abdominal abscess), blood loss needing transfusions, wound problems, blood clots and other organ injury( including ureter damage) were discussed. The patient understands and all questions were answered to their satisfaction. The patient is agreeable to proceed with surgery. Surgery will be scheduled at the patient's convience. An epidural to supplement general anesthesia was discussed.   Dr Chela Slade to place stents and cystoscopy                Nasrin Pack MD

## 2018-09-07 RX ORDER — PROMETHAZINE HYDROCHLORIDE 25 MG/1
TABLET ORAL
Qty: 10 TABLET | Refills: 5 | Status: SHIPPED | OUTPATIENT
Start: 2018-09-07 | End: 2018-09-14

## 2018-09-07 RX ORDER — POTASSIUM CHLORIDE 20 MEQ/1
TABLET, EXTENDED RELEASE ORAL
Qty: 3 TABLET | Refills: 0 | Status: ON HOLD | OUTPATIENT
Start: 2018-09-07 | End: 2018-10-03 | Stop reason: HOSPADM

## 2018-09-07 RX ORDER — ERYTHROMYCIN 250 MG/1
CAPSULE, DELAYED RELEASE ORAL
Qty: 3 CAPSULE | Refills: 0 | Status: ON HOLD | OUTPATIENT
Start: 2018-09-07 | End: 2018-10-03 | Stop reason: HOSPADM

## 2018-09-07 RX ORDER — NEOMYCIN SULFATE 500 MG/1
TABLET ORAL
Qty: 6 TABLET | Refills: 0 | Status: SHIPPED | OUTPATIENT
Start: 2018-09-07 | End: 2018-09-17

## 2018-09-11 ENCOUNTER — TELEPHONE (OUTPATIENT)
Dept: UROLOGY | Age: 68
End: 2018-09-11

## 2018-09-11 RX ORDER — ONDANSETRON 4 MG/1
4 TABLET, FILM COATED ORAL EVERY 8 HOURS PRN
Qty: 15 TABLET | Refills: 0 | Status: SHIPPED | OUTPATIENT
Start: 2018-09-11 | End: 2018-10-08 | Stop reason: SDUPTHER

## 2018-09-11 NOTE — TELEPHONE ENCOUNTER
Pt called stating for the past 4 days the Macrobid has been making her nausea all day. She was wondering if she could stop the medication or what you recommend.

## 2018-09-14 ENCOUNTER — TELEPHONE (OUTPATIENT)
Dept: UROLOGY | Age: 68
End: 2018-09-14

## 2018-09-17 DIAGNOSIS — R00.2 HEART PALPITATIONS: ICD-10-CM

## 2018-09-18 ENCOUNTER — OFFICE VISIT (OUTPATIENT)
Dept: UROLOGY | Age: 68
End: 2018-09-18
Payer: MEDICARE

## 2018-09-18 VITALS
HEART RATE: 84 BPM | WEIGHT: 180 LBS | SYSTOLIC BLOOD PRESSURE: 112 MMHG | BODY MASS INDEX: 35.34 KG/M2 | DIASTOLIC BLOOD PRESSURE: 60 MMHG | HEIGHT: 60 IN

## 2018-09-18 DIAGNOSIS — N30.00 ACUTE CYSTITIS WITHOUT HEMATURIA: Primary | ICD-10-CM

## 2018-09-18 DIAGNOSIS — N32.1 COLOVESICAL FISTULA: ICD-10-CM

## 2018-09-18 LAB
BILIRUBIN, POC: ABNORMAL
BLOOD URINE, POC: ABNORMAL
CLARITY, POC: ABNORMAL
COLOR, POC: YELLOW
GLUCOSE URINE, POC: ABNORMAL
KETONES, POC: ABNORMAL
LEUKOCYTE EST, POC: ABNORMAL
NITRITE, POC: ABNORMAL
PH, POC: 7
PROTEIN, POC: 100
SPECIFIC GRAVITY, POC: 1.02
UROBILINOGEN, POC: 0.2

## 2018-09-18 PROCEDURE — G8427 DOCREV CUR MEDS BY ELIG CLIN: HCPCS | Performed by: UROLOGY

## 2018-09-18 PROCEDURE — G8400 PT W/DXA NO RESULTS DOC: HCPCS | Performed by: UROLOGY

## 2018-09-18 PROCEDURE — 3017F COLORECTAL CA SCREEN DOC REV: CPT | Performed by: UROLOGY

## 2018-09-18 PROCEDURE — 1036F TOBACCO NON-USER: CPT | Performed by: UROLOGY

## 2018-09-18 PROCEDURE — 4040F PNEUMOC VAC/ADMIN/RCVD: CPT | Performed by: UROLOGY

## 2018-09-18 PROCEDURE — 1090F PRES/ABSN URINE INCON ASSESS: CPT | Performed by: UROLOGY

## 2018-09-18 PROCEDURE — 99214 OFFICE O/P EST MOD 30 MIN: CPT | Performed by: UROLOGY

## 2018-09-18 PROCEDURE — 1123F ACP DISCUSS/DSCN MKR DOCD: CPT | Performed by: UROLOGY

## 2018-09-18 PROCEDURE — 81003 URINALYSIS AUTO W/O SCOPE: CPT | Performed by: UROLOGY

## 2018-09-18 PROCEDURE — G8417 CALC BMI ABV UP PARAM F/U: HCPCS | Performed by: UROLOGY

## 2018-09-18 PROCEDURE — 1101F PT FALLS ASSESS-DOCD LE1/YR: CPT | Performed by: UROLOGY

## 2018-09-18 NOTE — PROGRESS NOTES
Abdomen and Pelvis without IV contrast.       CLINICAL HISTORY: N39.0 Recurrent urinary tract infection ICD10,  check for colovesical fistula. Intermittent pain for 2 years. No change in bowel movements. Air in urine since cystoscopy. No stool within urine. History of hysterectomy and BSO.       COMPARISONS: 7/2/2018       TECHNIQUE: Contiguous axial images were obtained through the abdomen and pelvis without contrast enhancement.  Coronal and sagittal reformations were made.       FINDINGS: Lung bases are clear.  The liver, spleen, pancreas, and adrenal glands have normal morphology. Gallbladder has been resected.        No focal renal lesion or hydronephrosis bilaterally. No nephrolithiasis. Bladder has normal configuration. No air seen within the bladder. There is air within the vagina. This was seen previously. No retroperitoneal fluid collection or lymphadenopathy.     No aortic aneurysm. Small umbilical hernia containing fat.       There is no duodenal or small bowel dilatation or focal wall thickening.  Appendix is normal.  There are diverticula in the sigmoid colon. The sigmoid colon wall appears thickened. There is mild stranding the fat posterior to the mid sigmoid colon. There    is a linear tract which appears to contain contrast and a few small air bubbles in this region which extends to the anterior rectum and just posterior to the vagina. This may be secondary to diverticulitis with fistula formation. It could be a fistula    into the vagina or rectum. This does not appear to extend into the bladder. Similar findings were seen on last exam.   No free air in the upper abdomen.       There is air in the inferior vaginal region. There also appears to be minimal air in the urethra. Possible fistulous tract from the vagina to the urethra.        Bones are intact. Narrowed L5-S1 disc. Posterior bony spurring.  Vacuum disc phenomenon at this level.           Impression   DIVERTICULA WITH WALL THICKENING IN

## 2018-09-19 ENCOUNTER — OFFICE VISIT (OUTPATIENT)
Dept: SURGERY | Age: 68
End: 2018-09-19
Payer: MEDICARE

## 2018-09-19 VITALS
TEMPERATURE: 97.3 F | BODY MASS INDEX: 35.14 KG/M2 | WEIGHT: 179 LBS | HEIGHT: 60 IN | DIASTOLIC BLOOD PRESSURE: 62 MMHG | SYSTOLIC BLOOD PRESSURE: 130 MMHG

## 2018-09-19 DIAGNOSIS — K57.32 SIGMOID DIVERTICULITIS: ICD-10-CM

## 2018-09-19 DIAGNOSIS — N82.4 COLOVAGINAL FISTULA: ICD-10-CM

## 2018-09-19 DIAGNOSIS — N32.1 COLO-VESICAL FISTULA: Primary | ICD-10-CM

## 2018-09-19 PROCEDURE — 99213 OFFICE O/P EST LOW 20 MIN: CPT | Performed by: SURGERY

## 2018-09-19 PROCEDURE — 1090F PRES/ABSN URINE INCON ASSESS: CPT | Performed by: SURGERY

## 2018-09-19 PROCEDURE — 4040F PNEUMOC VAC/ADMIN/RCVD: CPT | Performed by: SURGERY

## 2018-09-19 PROCEDURE — G8400 PT W/DXA NO RESULTS DOC: HCPCS | Performed by: SURGERY

## 2018-09-19 PROCEDURE — 1123F ACP DISCUSS/DSCN MKR DOCD: CPT | Performed by: SURGERY

## 2018-09-19 PROCEDURE — G8427 DOCREV CUR MEDS BY ELIG CLIN: HCPCS | Performed by: SURGERY

## 2018-09-19 PROCEDURE — G8417 CALC BMI ABV UP PARAM F/U: HCPCS | Performed by: SURGERY

## 2018-09-19 PROCEDURE — 1036F TOBACCO NON-USER: CPT | Performed by: SURGERY

## 2018-09-19 PROCEDURE — 1101F PT FALLS ASSESS-DOCD LE1/YR: CPT | Performed by: SURGERY

## 2018-09-19 PROCEDURE — 3017F COLORECTAL CA SCREEN DOC REV: CPT | Performed by: SURGERY

## 2018-09-19 RX ORDER — CIPROFLOXACIN 500 MG/1
500 TABLET, FILM COATED ORAL 2 TIMES DAILY
Qty: 14 TABLET | Refills: 0 | Status: SHIPPED | OUTPATIENT
Start: 2018-09-19 | End: 2018-09-20

## 2018-09-19 ASSESSMENT — ENCOUNTER SYMPTOMS
NAUSEA: 0
RHINORRHEA: 0
CHEST TIGHTNESS: 0
ABDOMINAL PAIN: 1
COLOR CHANGE: 0
ABDOMINAL DISTENTION: 0
SHORTNESS OF BREATH: 0
BLOOD IN STOOL: 0
RECTAL PAIN: 0
ALLERGIC/IMMUNOLOGIC NEGATIVE: 1

## 2018-09-20 ENCOUNTER — HOSPITAL ENCOUNTER (OUTPATIENT)
Dept: PREADMISSION TESTING | Age: 68
Discharge: HOME | End: 2018-09-24
Payer: MEDICARE

## 2018-09-20 VITALS
HEIGHT: 61 IN | BODY MASS INDEX: 34.17 KG/M2 | OXYGEN SATURATION: 98 % | RESPIRATION RATE: 18 BRPM | WEIGHT: 181 LBS | HEART RATE: 74 BPM | DIASTOLIC BLOOD PRESSURE: 68 MMHG | TEMPERATURE: 98.1 F | SYSTOLIC BLOOD PRESSURE: 139 MMHG

## 2018-09-20 LAB
ANION GAP SERPL CALCULATED.3IONS-SCNC: 13 MEQ/L (ref 7–13)
BUN BLDV-MCNC: 14 MG/DL (ref 8–23)
CALCIUM SERPL-MCNC: 9 MG/DL (ref 8.6–10.2)
CHLORIDE BLD-SCNC: 103 MEQ/L (ref 98–107)
CO2: 25 MEQ/L (ref 22–29)
CREAT SERPL-MCNC: 0.58 MG/DL (ref 0.5–0.9)
GFR AFRICAN AMERICAN: >60
GFR NON-AFRICAN AMERICAN: >60
GLUCOSE BLD-MCNC: 98 MG/DL (ref 74–109)
HCT VFR BLD CALC: 40.9 % (ref 37–47)
HEMOGLOBIN: 13.8 G/DL (ref 12–16)
MCH RBC QN AUTO: 29.6 PG (ref 27–31.3)
MCHC RBC AUTO-ENTMCNC: 33.8 % (ref 33–37)
MCV RBC AUTO: 87.6 FL (ref 82–100)
ORGANISM: ABNORMAL
PDW BLD-RTO: 13.6 % (ref 11.5–14.5)
PLATELET # BLD: 288 K/UL (ref 130–400)
POTASSIUM SERPL-SCNC: 3.7 MEQ/L (ref 3.5–5.1)
RBC # BLD: 4.68 M/UL (ref 4.2–5.4)
SODIUM BLD-SCNC: 141 MEQ/L (ref 132–144)
URINE CULTURE, ROUTINE: ABNORMAL
URINE CULTURE, ROUTINE: ABNORMAL
WBC # BLD: 4.9 K/UL (ref 4.8–10.8)

## 2018-09-20 PROCEDURE — 86850 RBC ANTIBODY SCREEN: CPT

## 2018-09-20 PROCEDURE — 85027 COMPLETE CBC AUTOMATED: CPT

## 2018-09-20 PROCEDURE — 86900 BLOOD TYPING SEROLOGIC ABO: CPT

## 2018-09-20 PROCEDURE — 86901 BLOOD TYPING SEROLOGIC RH(D): CPT

## 2018-09-20 PROCEDURE — 80048 BASIC METABOLIC PNL TOTAL CA: CPT

## 2018-09-20 RX ORDER — CLINDAMYCIN PHOSPHATE 600 MG/50ML
600 INJECTION INTRAVENOUS ONCE
Status: CANCELLED | OUTPATIENT
Start: 2018-09-25 | End: 2018-09-25

## 2018-09-20 RX ORDER — LIDOCAINE HYDROCHLORIDE 10 MG/ML
1 INJECTION, SOLUTION EPIDURAL; INFILTRATION; INTRACAUDAL; PERINEURAL
Status: CANCELLED | OUTPATIENT
Start: 2018-09-25 | End: 2018-09-25

## 2018-09-20 RX ORDER — SODIUM CHLORIDE 0.9 % (FLUSH) 0.9 %
10 SYRINGE (ML) INJECTION EVERY 12 HOURS SCHEDULED
Status: CANCELLED | OUTPATIENT
Start: 2018-09-25

## 2018-09-20 RX ORDER — SODIUM CHLORIDE, SODIUM LACTATE, POTASSIUM CHLORIDE, CALCIUM CHLORIDE 600; 310; 30; 20 MG/100ML; MG/100ML; MG/100ML; MG/100ML
INJECTION, SOLUTION INTRAVENOUS CONTINUOUS
Status: CANCELLED | OUTPATIENT
Start: 2018-09-25

## 2018-09-20 RX ORDER — CIPROFLOXACIN 2 MG/ML
400 INJECTION, SOLUTION INTRAVENOUS ONCE
Status: CANCELLED | OUTPATIENT
Start: 2018-09-25 | End: 2018-09-25

## 2018-09-20 RX ORDER — SODIUM CHLORIDE 0.9 % (FLUSH) 0.9 %
10 SYRINGE (ML) INJECTION PRN
Status: CANCELLED | OUTPATIENT
Start: 2018-09-25

## 2018-09-20 RX ORDER — ALVIMOPAN 12 MG/1
12 CAPSULE ORAL ONCE
Status: CANCELLED | OUTPATIENT
Start: 2018-09-25 | End: 2018-09-25

## 2018-09-20 RX ORDER — CEPHALEXIN 500 MG/1
500 CAPSULE ORAL 2 TIMES DAILY
Qty: 20 CAPSULE | Refills: 0 | Status: ON HOLD | OUTPATIENT
Start: 2018-09-20 | End: 2018-10-03 | Stop reason: HOSPADM

## 2018-09-20 RX ORDER — KETOROLAC TROMETHAMINE 30 MG/ML
30 INJECTION, SOLUTION INTRAMUSCULAR; INTRAVENOUS ONCE
Status: CANCELLED | OUTPATIENT
Start: 2018-09-25 | End: 2018-09-25

## 2018-09-20 NOTE — PROGRESS NOTES
EKG done 8/8 2018 & paper copy on chart. ( EM )  Mannitol 10 % bowel prep & instructions given to patient at [facility] appt.

## 2018-09-21 ENCOUNTER — TELEPHONE (OUTPATIENT)
Dept: UROLOGY | Age: 68
End: 2018-09-21

## 2018-09-21 LAB
ABO/RH: NORMAL
ANTIBODY SCREEN: NORMAL

## 2018-09-21 NOTE — TELEPHONE ENCOUNTER
Pt would like to know if she takes the keflex on Monday after her procedure. As she is taking other antibiotics was well.

## 2018-09-24 ENCOUNTER — ANESTHESIA EVENT (OUTPATIENT)
Dept: OPERATING ROOM | Age: 68
DRG: 982 | End: 2018-09-24
Payer: MEDICARE

## 2018-09-25 ENCOUNTER — APPOINTMENT (OUTPATIENT)
Dept: GENERAL RADIOLOGY | Age: 68
DRG: 982 | End: 2018-09-25
Attending: SURGERY
Payer: MEDICARE

## 2018-09-25 ENCOUNTER — HOSPITAL ENCOUNTER (INPATIENT)
Age: 68
LOS: 8 days | Discharge: HOME HEALTH | DRG: 982 | End: 2018-10-03
Attending: SURGERY | Admitting: SURGERY
Payer: MEDICARE

## 2018-09-25 ENCOUNTER — ANESTHESIA (OUTPATIENT)
Dept: OPERATING ROOM | Age: 68
DRG: 982 | End: 2018-09-25
Payer: MEDICARE

## 2018-09-25 VITALS — DIASTOLIC BLOOD PRESSURE: 57 MMHG | SYSTOLIC BLOOD PRESSURE: 100 MMHG | OXYGEN SATURATION: 100 % | TEMPERATURE: 94.6 F

## 2018-09-25 DIAGNOSIS — K57.32 SIGMOID DIVERTICULITIS: ICD-10-CM

## 2018-09-25 DIAGNOSIS — N32.1 COLO-VESICAL FISTULA: Primary | ICD-10-CM

## 2018-09-25 DIAGNOSIS — N82.4 COLOVAGINAL FISTULA: ICD-10-CM

## 2018-09-25 LAB
ANION GAP SERPL CALCULATED.3IONS-SCNC: 10 MEQ/L (ref 7–13)
BUN BLDV-MCNC: 11 MG/DL (ref 8–23)
CALCIUM SERPL-MCNC: 7 MG/DL (ref 8.6–10.2)
CHLORIDE BLD-SCNC: 112 MEQ/L (ref 98–107)
CO2: 18 MEQ/L (ref 22–29)
CREAT SERPL-MCNC: 0.57 MG/DL (ref 0.5–0.9)
GFR AFRICAN AMERICAN: >60
GFR NON-AFRICAN AMERICAN: >60
GLUCOSE BLD-MCNC: 158 MG/DL (ref 74–109)
HCT VFR BLD CALC: 30.8 % (ref 37–47)
HEMOGLOBIN: 10.1 G/DL (ref 12–16)
MCH RBC QN AUTO: 29 PG (ref 27–31.3)
MCHC RBC AUTO-ENTMCNC: 32.7 % (ref 33–37)
MCV RBC AUTO: 88.6 FL (ref 82–100)
PDW BLD-RTO: 13.6 % (ref 11.5–14.5)
PLATELET # BLD: 217 K/UL (ref 130–400)
POTASSIUM REFLEX MAGNESIUM: 3.8 MEQ/L (ref 3.5–5.1)
RBC # BLD: 3.48 M/UL (ref 4.2–5.4)
SODIUM BLD-SCNC: 140 MEQ/L (ref 132–144)
WBC # BLD: 11.5 K/UL (ref 4.8–10.8)

## 2018-09-25 PROCEDURE — 2580000003 HC RX 258: Performed by: STUDENT IN AN ORGANIZED HEALTH CARE EDUCATION/TRAINING PROGRAM

## 2018-09-25 PROCEDURE — C1769 GUIDE WIRE: HCPCS | Performed by: SURGERY

## 2018-09-25 PROCEDURE — 2500000003 HC RX 250 WO HCPCS: Performed by: NURSE ANESTHETIST, CERTIFIED REGISTERED

## 2018-09-25 PROCEDURE — 2580000003 HC RX 258: Performed by: NURSE PRACTITIONER

## 2018-09-25 PROCEDURE — 2580000003 HC RX 258: Performed by: SURGERY

## 2018-09-25 PROCEDURE — 2580000003 HC RX 258: Performed by: ANESTHESIOLOGY

## 2018-09-25 PROCEDURE — 2580000003 HC RX 258: Performed by: NURSE ANESTHETIST, CERTIFIED REGISTERED

## 2018-09-25 PROCEDURE — 2500000003 HC RX 250 WO HCPCS: Performed by: SURGERY

## 2018-09-25 PROCEDURE — 6360000002 HC RX W HCPCS: Performed by: ANESTHESIOLOGY

## 2018-09-25 PROCEDURE — 6360000002 HC RX W HCPCS: Performed by: STUDENT IN AN ORGANIZED HEALTH CARE EDUCATION/TRAINING PROGRAM

## 2018-09-25 PROCEDURE — 0D1N0Z4 BYPASS SIGMOID COLON TO CUTANEOUS, OPEN APPROACH: ICD-10-PCS | Performed by: SURGERY

## 2018-09-25 PROCEDURE — 2720000010 HC SURG SUPPLY STERILE: Performed by: SURGERY

## 2018-09-25 PROCEDURE — 88307 TISSUE EXAM BY PATHOLOGIST: CPT

## 2018-09-25 PROCEDURE — 6370000000 HC RX 637 (ALT 250 FOR IP): Performed by: NURSE PRACTITIONER

## 2018-09-25 PROCEDURE — 85027 COMPLETE CBC AUTOMATED: CPT

## 2018-09-25 PROCEDURE — 3700000001 HC ADD 15 MINUTES (ANESTHESIA): Performed by: SURGERY

## 2018-09-25 PROCEDURE — 6360000002 HC RX W HCPCS: Performed by: NURSE PRACTITIONER

## 2018-09-25 PROCEDURE — 2580000003 HC RX 258: Performed by: UROLOGY

## 2018-09-25 PROCEDURE — 6360000002 HC RX W HCPCS: Performed by: NURSE ANESTHETIST, CERTIFIED REGISTERED

## 2018-09-25 PROCEDURE — 2500000003 HC RX 250 WO HCPCS: Performed by: ANESTHESIOLOGY

## 2018-09-25 PROCEDURE — 1210000000 HC MED SURG R&B

## 2018-09-25 PROCEDURE — C1758 CATHETER, URETERAL: HCPCS | Performed by: SURGERY

## 2018-09-25 PROCEDURE — 52005 CYSTO W/URTRL CATHJ: CPT | Performed by: UROLOGY

## 2018-09-25 PROCEDURE — 6360000002 HC RX W HCPCS: Performed by: SURGERY

## 2018-09-25 PROCEDURE — 3600000004 HC SURGERY LEVEL 4 BASE: Performed by: SURGERY

## 2018-09-25 PROCEDURE — 3600000014 HC SURGERY LEVEL 4 ADDTL 15MIN: Performed by: SURGERY

## 2018-09-25 PROCEDURE — 2709999900 HC NON-CHARGEABLE SUPPLY: Performed by: SURGERY

## 2018-09-25 PROCEDURE — 6360000002 HC RX W HCPCS

## 2018-09-25 PROCEDURE — 2780000010 HC IMPLANT OTHER: Performed by: SURGERY

## 2018-09-25 PROCEDURE — 0T9880Z DRAINAGE OF BILATERAL URETERS WITH DRAINAGE DEVICE, VIA NATURAL OR ARTIFICIAL OPENING ENDOSCOPIC: ICD-10-PCS | Performed by: PSYCHIATRY & NEUROLOGY

## 2018-09-25 PROCEDURE — 6360000004 HC RX CONTRAST MEDICATION: Performed by: UROLOGY

## 2018-09-25 PROCEDURE — 44143 PARTIAL REMOVAL OF COLON: CPT | Performed by: SURGERY

## 2018-09-25 PROCEDURE — 7100000001 HC PACU RECOVERY - ADDTL 15 MIN: Performed by: SURGERY

## 2018-09-25 PROCEDURE — 80048 BASIC METABOLIC PNL TOTAL CA: CPT

## 2018-09-25 PROCEDURE — 3700000000 HC ANESTHESIA ATTENDED CARE: Performed by: SURGERY

## 2018-09-25 PROCEDURE — 3209999900 FLUORO FOR SURGICAL PROCEDURES

## 2018-09-25 PROCEDURE — 0DTN0ZZ RESECTION OF SIGMOID COLON, OPEN APPROACH: ICD-10-PCS | Performed by: SURGERY

## 2018-09-25 PROCEDURE — 7100000000 HC PACU RECOVERY - FIRST 15 MIN: Performed by: SURGERY

## 2018-09-25 PROCEDURE — 2500000003 HC RX 250 WO HCPCS: Performed by: NURSE PRACTITIONER

## 2018-09-25 PROCEDURE — L0625 LO FLEX L1-BELOW L5 PRE OTS: HCPCS | Performed by: SURGERY

## 2018-09-25 RX ORDER — MAGNESIUM HYDROXIDE 1200 MG/15ML
LIQUID ORAL CONTINUOUS PRN
Status: COMPLETED | OUTPATIENT
Start: 2018-09-25 | End: 2018-09-25

## 2018-09-25 RX ORDER — ONDANSETRON 2 MG/ML
4 INJECTION INTRAMUSCULAR; INTRAVENOUS
Status: DISCONTINUED | OUTPATIENT
Start: 2018-09-25 | End: 2018-09-25 | Stop reason: HOSPADM

## 2018-09-25 RX ORDER — DEXTROSE, SODIUM CHLORIDE, AND POTASSIUM CHLORIDE 5; .45; .15 G/100ML; G/100ML; G/100ML
INJECTION INTRAVENOUS CONTINUOUS
Status: DISCONTINUED | OUTPATIENT
Start: 2018-09-25 | End: 2018-09-29

## 2018-09-25 RX ORDER — MIDAZOLAM HYDROCHLORIDE 1 MG/ML
INJECTION INTRAMUSCULAR; INTRAVENOUS
Status: COMPLETED
Start: 2018-09-25 | End: 2018-09-25

## 2018-09-25 RX ORDER — METOPROLOL SUCCINATE 50 MG/1
50 TABLET, EXTENDED RELEASE ORAL DAILY
Status: DISCONTINUED | OUTPATIENT
Start: 2018-09-25 | End: 2018-09-26

## 2018-09-25 RX ORDER — HYDROCODONE BITARTRATE AND ACETAMINOPHEN 5; 325 MG/1; MG/1
1 TABLET ORAL PRN
Status: DISCONTINUED | OUTPATIENT
Start: 2018-09-25 | End: 2018-09-25 | Stop reason: HOSPADM

## 2018-09-25 RX ORDER — SODIUM CHLORIDE, SODIUM LACTATE, POTASSIUM CHLORIDE, CALCIUM CHLORIDE 600; 310; 30; 20 MG/100ML; MG/100ML; MG/100ML; MG/100ML
INJECTION, SOLUTION INTRAVENOUS ONCE
Status: COMPLETED | OUTPATIENT
Start: 2018-09-25 | End: 2018-09-25

## 2018-09-25 RX ORDER — DIMETHICONE, CAMPHOR (SYNTHETIC), MENTHOL, AND PHENOL 1.1; .5; .625; .5 G/100G; G/100G; G/100G; G/100G
OINTMENT TOPICAL
Status: DISPENSED
Start: 2018-09-25 | End: 2018-09-25

## 2018-09-25 RX ORDER — LIDOCAINE HYDROCHLORIDE 20 MG/ML
INJECTION, SOLUTION INFILTRATION; PERINEURAL PRN
Status: DISCONTINUED | OUTPATIENT
Start: 2018-09-25 | End: 2018-09-25 | Stop reason: SDUPTHER

## 2018-09-25 RX ORDER — ONDANSETRON 4 MG/1
4 TABLET, FILM COATED ORAL EVERY 8 HOURS PRN
Status: DISCONTINUED | OUTPATIENT
Start: 2018-09-25 | End: 2018-10-03 | Stop reason: HOSPADM

## 2018-09-25 RX ORDER — ONDANSETRON 2 MG/ML
4 INJECTION INTRAMUSCULAR; INTRAVENOUS EVERY 6 HOURS PRN
Status: DISCONTINUED | OUTPATIENT
Start: 2018-09-25 | End: 2018-09-27 | Stop reason: SDUPTHER

## 2018-09-25 RX ORDER — BUPIVACAINE HYDROCHLORIDE 2.5 MG/ML
INJECTION, SOLUTION EPIDURAL; INFILTRATION; INTRACAUDAL PRN
Status: DISCONTINUED | OUTPATIENT
Start: 2018-09-25 | End: 2018-09-25 | Stop reason: SDUPTHER

## 2018-09-25 RX ORDER — SODIUM CHLORIDE 0.9 % (FLUSH) 0.9 %
10 SYRINGE (ML) INJECTION EVERY 12 HOURS SCHEDULED
Status: DISCONTINUED | OUTPATIENT
Start: 2018-09-25 | End: 2018-10-03 | Stop reason: HOSPADM

## 2018-09-25 RX ORDER — SODIUM CHLORIDE 0.9 % (FLUSH) 0.9 %
10 SYRINGE (ML) INJECTION EVERY 12 HOURS SCHEDULED
Status: DISCONTINUED | OUTPATIENT
Start: 2018-09-25 | End: 2018-09-25 | Stop reason: HOSPADM

## 2018-09-25 RX ORDER — MAGNESIUM HYDROXIDE 1200 MG/15ML
LIQUID ORAL PRN
Status: DISCONTINUED | OUTPATIENT
Start: 2018-09-25 | End: 2018-09-25 | Stop reason: HOSPADM

## 2018-09-25 RX ORDER — DIPHENHYDRAMINE HYDROCHLORIDE 50 MG/ML
12.5 INJECTION INTRAMUSCULAR; INTRAVENOUS
Status: DISCONTINUED | OUTPATIENT
Start: 2018-09-25 | End: 2018-09-25 | Stop reason: HOSPADM

## 2018-09-25 RX ORDER — FENTANYL CITRATE 50 UG/ML
50 INJECTION, SOLUTION INTRAMUSCULAR; INTRAVENOUS EVERY 10 MIN PRN
Status: DISCONTINUED | OUTPATIENT
Start: 2018-09-25 | End: 2018-09-25 | Stop reason: HOSPADM

## 2018-09-25 RX ORDER — LEVOTHYROXINE SODIUM 0.12 MG/1
125 TABLET ORAL DAILY
Status: DISCONTINUED | OUTPATIENT
Start: 2018-09-25 | End: 2018-10-03 | Stop reason: HOSPADM

## 2018-09-25 RX ORDER — SODIUM CHLORIDE 9 MG/ML
INJECTION, SOLUTION INTRAVENOUS CONTINUOUS PRN
Status: DISCONTINUED | OUTPATIENT
Start: 2018-09-25 | End: 2018-09-25 | Stop reason: SDUPTHER

## 2018-09-25 RX ORDER — LIDOCAINE HYDROCHLORIDE 10 MG/ML
INJECTION, SOLUTION EPIDURAL; INFILTRATION; INTRACAUDAL; PERINEURAL PRN
Status: DISCONTINUED | OUTPATIENT
Start: 2018-09-25 | End: 2018-09-25 | Stop reason: SDUPTHER

## 2018-09-25 RX ORDER — HYDROCODONE BITARTRATE AND ACETAMINOPHEN 5; 325 MG/1; MG/1
2 TABLET ORAL PRN
Status: DISCONTINUED | OUTPATIENT
Start: 2018-09-25 | End: 2018-09-25 | Stop reason: HOSPADM

## 2018-09-25 RX ORDER — KETOROLAC TROMETHAMINE 30 MG/ML
30 INJECTION, SOLUTION INTRAMUSCULAR; INTRAVENOUS ONCE
Status: COMPLETED | OUTPATIENT
Start: 2018-09-25 | End: 2018-09-25

## 2018-09-25 RX ORDER — PROPOFOL 10 MG/ML
INJECTION, EMULSION INTRAVENOUS PRN
Status: DISCONTINUED | OUTPATIENT
Start: 2018-09-25 | End: 2018-09-25 | Stop reason: SDUPTHER

## 2018-09-25 RX ORDER — CIPROFLOXACIN 2 MG/ML
400 INJECTION, SOLUTION INTRAVENOUS ONCE
Status: COMPLETED | OUTPATIENT
Start: 2018-09-25 | End: 2018-09-25

## 2018-09-25 RX ORDER — NALOXONE HYDROCHLORIDE 0.4 MG/ML
0.4 INJECTION, SOLUTION INTRAMUSCULAR; INTRAVENOUS; SUBCUTANEOUS PRN
Status: DISCONTINUED | OUTPATIENT
Start: 2018-09-25 | End: 2018-10-03 | Stop reason: HOSPADM

## 2018-09-25 RX ORDER — LIDOCAINE HYDROCHLORIDE 10 MG/ML
1 INJECTION, SOLUTION EPIDURAL; INFILTRATION; INTRACAUDAL; PERINEURAL
Status: COMPLETED | OUTPATIENT
Start: 2018-09-25 | End: 2018-09-25

## 2018-09-25 RX ORDER — SODIUM CHLORIDE, SODIUM LACTATE, POTASSIUM CHLORIDE, CALCIUM CHLORIDE 600; 310; 30; 20 MG/100ML; MG/100ML; MG/100ML; MG/100ML
INJECTION, SOLUTION INTRAVENOUS CONTINUOUS
Status: DISCONTINUED | OUTPATIENT
Start: 2018-09-25 | End: 2018-09-25

## 2018-09-25 RX ORDER — DEXAMETHASONE SODIUM PHOSPHATE 10 MG/ML
INJECTION INTRAMUSCULAR; INTRAVENOUS PRN
Status: DISCONTINUED | OUTPATIENT
Start: 2018-09-25 | End: 2018-09-25 | Stop reason: SDUPTHER

## 2018-09-25 RX ORDER — METOCLOPRAMIDE HYDROCHLORIDE 5 MG/ML
10 INJECTION INTRAMUSCULAR; INTRAVENOUS
Status: DISCONTINUED | OUTPATIENT
Start: 2018-09-25 | End: 2018-09-25 | Stop reason: HOSPADM

## 2018-09-25 RX ORDER — ALVIMOPAN 12 MG/1
12 CAPSULE ORAL ONCE
Status: COMPLETED | OUTPATIENT
Start: 2018-09-25 | End: 2018-09-25

## 2018-09-25 RX ORDER — SODIUM CHLORIDE 0.9 % (FLUSH) 0.9 %
10 SYRINGE (ML) INJECTION PRN
Status: DISCONTINUED | OUTPATIENT
Start: 2018-09-25 | End: 2018-10-03 | Stop reason: HOSPADM

## 2018-09-25 RX ORDER — ONDANSETRON 2 MG/ML
INJECTION INTRAMUSCULAR; INTRAVENOUS PRN
Status: DISCONTINUED | OUTPATIENT
Start: 2018-09-25 | End: 2018-09-25 | Stop reason: SDUPTHER

## 2018-09-25 RX ORDER — ACETAMINOPHEN 325 MG/1
650 TABLET ORAL EVERY 4 HOURS PRN
Status: DISCONTINUED | OUTPATIENT
Start: 2018-09-25 | End: 2018-10-01

## 2018-09-25 RX ORDER — ONDANSETRON 2 MG/ML
4 INJECTION INTRAMUSCULAR; INTRAVENOUS EVERY 6 HOURS PRN
Status: DISCONTINUED | OUTPATIENT
Start: 2018-09-25 | End: 2018-10-03 | Stop reason: HOSPADM

## 2018-09-25 RX ORDER — FENTANYL CITRATE 50 UG/ML
INJECTION, SOLUTION INTRAMUSCULAR; INTRAVENOUS PRN
Status: DISCONTINUED | OUTPATIENT
Start: 2018-09-25 | End: 2018-09-25 | Stop reason: SDUPTHER

## 2018-09-25 RX ORDER — CLINDAMYCIN PHOSPHATE 600 MG/50ML
600 INJECTION INTRAVENOUS ONCE
Status: COMPLETED | OUTPATIENT
Start: 2018-09-25 | End: 2018-09-25

## 2018-09-25 RX ORDER — MIDAZOLAM HYDROCHLORIDE 1 MG/ML
1 INJECTION INTRAMUSCULAR; INTRAVENOUS ONCE
Status: COMPLETED | OUTPATIENT
Start: 2018-09-25 | End: 2018-09-25

## 2018-09-25 RX ORDER — SODIUM CHLORIDE 0.9 % (FLUSH) 0.9 %
10 SYRINGE (ML) INJECTION PRN
Status: DISCONTINUED | OUTPATIENT
Start: 2018-09-25 | End: 2018-09-25 | Stop reason: HOSPADM

## 2018-09-25 RX ORDER — CIPROFLOXACIN 2 MG/ML
400 INJECTION, SOLUTION INTRAVENOUS EVERY 12 HOURS
Status: COMPLETED | OUTPATIENT
Start: 2018-09-25 | End: 2018-09-26

## 2018-09-25 RX ORDER — ROCURONIUM BROMIDE 10 MG/ML
INJECTION, SOLUTION INTRAVENOUS PRN
Status: DISCONTINUED | OUTPATIENT
Start: 2018-09-25 | End: 2018-09-25 | Stop reason: SDUPTHER

## 2018-09-25 RX ORDER — MEPERIDINE HYDROCHLORIDE 25 MG/ML
12.5 INJECTION INTRAMUSCULAR; INTRAVENOUS; SUBCUTANEOUS EVERY 5 MIN PRN
Status: DISCONTINUED | OUTPATIENT
Start: 2018-09-25 | End: 2018-09-25 | Stop reason: HOSPADM

## 2018-09-25 RX ORDER — KETOROLAC TROMETHAMINE 15 MG/ML
15 INJECTION, SOLUTION INTRAMUSCULAR; INTRAVENOUS EVERY 6 HOURS PRN
Status: COMPLETED | OUTPATIENT
Start: 2018-09-25 | End: 2018-09-26

## 2018-09-25 RX ORDER — SUCCINYLCHOLINE/SOD CL,ISO/PF 100 MG/5ML
SYRINGE (ML) INTRAVENOUS PRN
Status: DISCONTINUED | OUTPATIENT
Start: 2018-09-25 | End: 2018-09-25 | Stop reason: SDUPTHER

## 2018-09-25 RX ADMIN — MIDAZOLAM HYDROCHLORIDE 1 MG: 1 INJECTION, SOLUTION INTRAMUSCULAR; INTRAVENOUS at 07:31

## 2018-09-25 RX ADMIN — ONDANSETRON HYDROCHLORIDE 4 MG: 2 INJECTION, SOLUTION INTRAMUSCULAR; INTRAVENOUS at 11:44

## 2018-09-25 RX ADMIN — ONDANSETRON HYDROCHLORIDE 4 MG: 2 INJECTION, SOLUTION INTRAMUSCULAR; INTRAVENOUS at 13:40

## 2018-09-25 RX ADMIN — PHENYLEPHRINE HYDROCHLORIDE 100 MCG: 10 INJECTION INTRAVENOUS at 08:08

## 2018-09-25 RX ADMIN — MEPERIDINE HYDROCHLORIDE 12.5 MG: 25 INJECTION, SOLUTION INTRAMUSCULAR; INTRAVENOUS; SUBCUTANEOUS at 15:00

## 2018-09-25 RX ADMIN — ROCURONIUM BROMIDE 10 MG: 10 INJECTION INTRAVENOUS at 09:36

## 2018-09-25 RX ADMIN — MIDAZOLAM HYDROCHLORIDE 1 MG: 1 INJECTION, SOLUTION INTRAMUSCULAR; INTRAVENOUS at 08:23

## 2018-09-25 RX ADMIN — CIPROFLOXACIN 400 MG: 2 INJECTION, SOLUTION INTRAVENOUS at 07:50

## 2018-09-25 RX ADMIN — ALVIMOPAN 12 MG: 12 CAPSULE ORAL at 07:07

## 2018-09-25 RX ADMIN — ROCURONIUM BROMIDE 5 MG: 10 INJECTION INTRAVENOUS at 07:40

## 2018-09-25 RX ADMIN — PHENYLEPHRINE HYDROCHLORIDE 100 MCG: 10 INJECTION INTRAVENOUS at 08:01

## 2018-09-25 RX ADMIN — KETOROLAC TROMETHAMINE 30 MG: 30 INJECTION, SOLUTION INTRAMUSCULAR; INTRAVENOUS at 07:38

## 2018-09-25 RX ADMIN — SODIUM CHLORIDE, POTASSIUM CHLORIDE, SODIUM LACTATE AND CALCIUM CHLORIDE: 600; 310; 30; 20 INJECTION, SOLUTION INTRAVENOUS at 14:35

## 2018-09-25 RX ADMIN — MEPERIDINE HYDROCHLORIDE 12.5 MG: 25 INJECTION, SOLUTION INTRAMUSCULAR; INTRAVENOUS; SUBCUTANEOUS at 13:25

## 2018-09-25 RX ADMIN — SODIUM CHLORIDE, POTASSIUM CHLORIDE, SODIUM LACTATE AND CALCIUM CHLORIDE: 600; 310; 30; 20 INJECTION, SOLUTION INTRAVENOUS at 13:30

## 2018-09-25 RX ADMIN — ROCURONIUM BROMIDE 10 MG: 10 INJECTION INTRAVENOUS at 09:11

## 2018-09-25 RX ADMIN — PHENYLEPHRINE HYDROCHLORIDE 50 MCG: 10 INJECTION INTRAVENOUS at 10:49

## 2018-09-25 RX ADMIN — ROCURONIUM BROMIDE 45 MG: 10 INJECTION INTRAVENOUS at 07:52

## 2018-09-25 RX ADMIN — SODIUM CHLORIDE: 9 INJECTION, SOLUTION INTRAVENOUS at 08:57

## 2018-09-25 RX ADMIN — MEPERIDINE HYDROCHLORIDE 12.5 MG: 25 INJECTION, SOLUTION INTRAMUSCULAR; INTRAVENOUS; SUBCUTANEOUS at 15:02

## 2018-09-25 RX ADMIN — ROCURONIUM BROMIDE 10 MG: 10 INJECTION INTRAVENOUS at 11:11

## 2018-09-25 RX ADMIN — ROCURONIUM BROMIDE 10 MG: 10 INJECTION INTRAVENOUS at 09:55

## 2018-09-25 RX ADMIN — LIDOCAINE HYDROCHLORIDE 0.1 ML: 10 INJECTION, SOLUTION EPIDURAL; INFILTRATION; INTRACAUDAL; PERINEURAL at 07:10

## 2018-09-25 RX ADMIN — ROPIVACAINE HYDROCHLORIDE 6 ML/HR: 5 INJECTION, SOLUTION EPIDURAL; INFILTRATION; PERINEURAL at 08:40

## 2018-09-25 RX ADMIN — Medication 10 ML: at 22:37

## 2018-09-25 RX ADMIN — SUGAMMADEX 200 MG: 100 INJECTION, SOLUTION INTRAVENOUS at 12:43

## 2018-09-25 RX ADMIN — PHENYLEPHRINE HYDROCHLORIDE 50 MCG: 10 INJECTION INTRAVENOUS at 10:56

## 2018-09-25 RX ADMIN — ROCURONIUM BROMIDE 10 MG: 10 INJECTION INTRAVENOUS at 10:46

## 2018-09-25 RX ADMIN — PHENYLEPHRINE HYDROCHLORIDE 100 MCG: 10 INJECTION INTRAVENOUS at 10:29

## 2018-09-25 RX ADMIN — SODIUM CHLORIDE, POTASSIUM CHLORIDE, SODIUM LACTATE AND CALCIUM CHLORIDE: 600; 310; 30; 20 INJECTION, SOLUTION INTRAVENOUS at 08:08

## 2018-09-25 RX ADMIN — KETOROLAC TROMETHAMINE 30 MG: 30 INJECTION, SOLUTION INTRAMUSCULAR at 15:40

## 2018-09-25 RX ADMIN — DEXAMETHASONE SODIUM PHOSPHATE 4 MG: 10 INJECTION INTRAMUSCULAR; INTRAVENOUS at 07:54

## 2018-09-25 RX ADMIN — CLINDAMYCIN PHOSPHATE 600 MG: 600 INJECTION, SOLUTION INTRAVENOUS at 07:39

## 2018-09-25 RX ADMIN — Medication 100 MG: at 07:40

## 2018-09-25 RX ADMIN — PHENYLEPHRINE HYDROCHLORIDE 50 MCG: 10 INJECTION INTRAVENOUS at 10:13

## 2018-09-25 RX ADMIN — ROCURONIUM BROMIDE 10 MG: 10 INJECTION INTRAVENOUS at 12:10

## 2018-09-25 RX ADMIN — METRONIDAZOLE 500 MG: 500 INJECTION, SOLUTION INTRAVENOUS at 17:33

## 2018-09-25 RX ADMIN — CIPROFLOXACIN 400 MG: 2 INJECTION, SOLUTION INTRAVENOUS at 22:36

## 2018-09-25 RX ADMIN — PHENYLEPHRINE HYDROCHLORIDE 50 MCG: 10 INJECTION INTRAVENOUS at 10:05

## 2018-09-25 RX ADMIN — KETOROLAC TROMETHAMINE 15 MG: 15 INJECTION, SOLUTION INTRAMUSCULAR; INTRAVENOUS at 22:36

## 2018-09-25 RX ADMIN — LIDOCAINE HYDROCHLORIDE 5 ML: 10 INJECTION, SOLUTION EPIDURAL; INFILTRATION; INTRACAUDAL; PERINEURAL at 13:20

## 2018-09-25 RX ADMIN — LIDOCAINE HYDROCHLORIDE 40 MG: 20 INJECTION, SOLUTION INFILTRATION; PERINEURAL at 07:40

## 2018-09-25 RX ADMIN — PROPOFOL 150 MG: 10 INJECTION, EMULSION INTRAVENOUS at 07:40

## 2018-09-25 RX ADMIN — ROPIVACAINE HYDROCHLORIDE: 5 INJECTION, SOLUTION EPIDURAL; INFILTRATION; PERINEURAL at 21:56

## 2018-09-25 RX ADMIN — FENTANYL CITRATE 50 MCG: 50 INJECTION, SOLUTION INTRAMUSCULAR; INTRAVENOUS at 07:40

## 2018-09-25 RX ADMIN — SODIUM CHLORIDE, POTASSIUM CHLORIDE, SODIUM LACTATE AND CALCIUM CHLORIDE 1 ML/HR: 600; 310; 30; 20 INJECTION, SOLUTION INTRAVENOUS at 07:11

## 2018-09-25 RX ADMIN — SODIUM CHLORIDE: 9 INJECTION, SOLUTION INTRAVENOUS at 10:30

## 2018-09-25 RX ADMIN — BUPIVACAINE HYDROCHLORIDE 6 ML: 2.5 INJECTION, SOLUTION EPIDURAL; INFILTRATION; INTRACAUDAL; PERINEURAL at 07:38

## 2018-09-25 RX ADMIN — POTASSIUM CHLORIDE, DEXTROSE MONOHYDRATE AND SODIUM CHLORIDE: 150; 5; 450 INJECTION, SOLUTION INTRAVENOUS at 17:30

## 2018-09-25 RX ADMIN — SODIUM CHLORIDE: 9 INJECTION, SOLUTION INTRAVENOUS at 11:05

## 2018-09-25 ASSESSMENT — PULMONARY FUNCTION TESTS
PIF_VALUE: 23
PIF_VALUE: 26
PIF_VALUE: 22
PIF_VALUE: 23
PIF_VALUE: 26
PIF_VALUE: 26
PIF_VALUE: 21
PIF_VALUE: 21
PIF_VALUE: 27
PIF_VALUE: 19
PIF_VALUE: 28
PIF_VALUE: 18
PIF_VALUE: 28
PIF_VALUE: 27
PIF_VALUE: 21
PIF_VALUE: 28
PIF_VALUE: 20
PIF_VALUE: 22
PIF_VALUE: 26
PIF_VALUE: 19
PIF_VALUE: 27
PIF_VALUE: 19
PIF_VALUE: 26
PIF_VALUE: 20
PIF_VALUE: 20
PIF_VALUE: 22
PIF_VALUE: 26
PIF_VALUE: 28
PIF_VALUE: 25
PIF_VALUE: 20
PIF_VALUE: 28
PIF_VALUE: 21
PIF_VALUE: 27
PIF_VALUE: 22
PIF_VALUE: 26
PIF_VALUE: 21
PIF_VALUE: 25
PIF_VALUE: 22
PIF_VALUE: 17
PIF_VALUE: 27
PIF_VALUE: 1
PIF_VALUE: 25
PIF_VALUE: 22
PIF_VALUE: 26
PIF_VALUE: 27
PIF_VALUE: 22
PIF_VALUE: 20
PIF_VALUE: 27
PIF_VALUE: 21
PIF_VALUE: 21
PIF_VALUE: 22
PIF_VALUE: 18
PIF_VALUE: 25
PIF_VALUE: 20
PIF_VALUE: 22
PIF_VALUE: 1
PIF_VALUE: 27
PIF_VALUE: 24
PIF_VALUE: 19
PIF_VALUE: 19
PIF_VALUE: 25
PIF_VALUE: 26
PIF_VALUE: 26
PIF_VALUE: 27
PIF_VALUE: 29
PIF_VALUE: 21
PIF_VALUE: 25
PIF_VALUE: 21
PIF_VALUE: 27
PIF_VALUE: 22
PIF_VALUE: 27
PIF_VALUE: 21
PIF_VALUE: 27
PIF_VALUE: 19
PIF_VALUE: 20
PIF_VALUE: 22
PIF_VALUE: 26
PIF_VALUE: 27
PIF_VALUE: 22
PIF_VALUE: 27
PIF_VALUE: 25
PIF_VALUE: 27
PIF_VALUE: 1
PIF_VALUE: 22
PIF_VALUE: 22
PIF_VALUE: 27
PIF_VALUE: 21
PIF_VALUE: 27
PIF_VALUE: 24
PIF_VALUE: 26
PIF_VALUE: 24
PIF_VALUE: 22
PIF_VALUE: 2
PIF_VALUE: 25
PIF_VALUE: 20
PIF_VALUE: 27
PIF_VALUE: 1
PIF_VALUE: 22
PIF_VALUE: 18
PIF_VALUE: 21
PIF_VALUE: 22
PIF_VALUE: 21
PIF_VALUE: 20
PIF_VALUE: 4
PIF_VALUE: 22
PIF_VALUE: 26
PIF_VALUE: 23
PIF_VALUE: 21
PIF_VALUE: 21
PIF_VALUE: 20
PIF_VALUE: 27
PIF_VALUE: 26
PIF_VALUE: 18
PIF_VALUE: 27
PIF_VALUE: 20
PIF_VALUE: 18
PIF_VALUE: 19
PIF_VALUE: 26
PIF_VALUE: 21
PIF_VALUE: 22
PIF_VALUE: 27
PIF_VALUE: 25
PIF_VALUE: 21
PIF_VALUE: 27
PIF_VALUE: 20
PIF_VALUE: 22
PIF_VALUE: 27
PIF_VALUE: 22
PIF_VALUE: 20
PIF_VALUE: 18
PIF_VALUE: 21
PIF_VALUE: 22
PIF_VALUE: 25
PIF_VALUE: 27
PIF_VALUE: 20
PIF_VALUE: 22
PIF_VALUE: 25
PIF_VALUE: 26
PIF_VALUE: 26
PIF_VALUE: 20
PIF_VALUE: 22
PIF_VALUE: 26
PIF_VALUE: 19
PIF_VALUE: 23
PIF_VALUE: 21
PIF_VALUE: 18
PIF_VALUE: 27
PIF_VALUE: 23
PIF_VALUE: 24
PIF_VALUE: 27
PIF_VALUE: 21
PIF_VALUE: 20
PIF_VALUE: 24
PIF_VALUE: 26
PIF_VALUE: 1
PIF_VALUE: 24
PIF_VALUE: 21
PIF_VALUE: 27
PIF_VALUE: 18
PIF_VALUE: 22
PIF_VALUE: 28
PIF_VALUE: 26
PIF_VALUE: 20
PIF_VALUE: 26
PIF_VALUE: 26
PIF_VALUE: 22
PIF_VALUE: 1
PIF_VALUE: 4
PIF_VALUE: 27
PIF_VALUE: 26
PIF_VALUE: 26
PIF_VALUE: 22
PIF_VALUE: 27
PIF_VALUE: 26
PIF_VALUE: 28
PIF_VALUE: 22
PIF_VALUE: 26
PIF_VALUE: 26
PIF_VALUE: 19
PIF_VALUE: 22
PIF_VALUE: 20
PIF_VALUE: 27
PIF_VALUE: 22
PIF_VALUE: 19
PIF_VALUE: 1
PIF_VALUE: 19
PIF_VALUE: 26
PIF_VALUE: 25
PIF_VALUE: 21
PIF_VALUE: 27
PIF_VALUE: 25
PIF_VALUE: 26
PIF_VALUE: 27
PIF_VALUE: 22
PIF_VALUE: 21
PIF_VALUE: 22
PIF_VALUE: 22
PIF_VALUE: 1
PIF_VALUE: 23
PIF_VALUE: 22
PIF_VALUE: 26
PIF_VALUE: 26
PIF_VALUE: 25
PIF_VALUE: 27
PIF_VALUE: 25
PIF_VALUE: 21
PIF_VALUE: 2
PIF_VALUE: 26
PIF_VALUE: 21
PIF_VALUE: 27
PIF_VALUE: 26
PIF_VALUE: 1
PIF_VALUE: 27
PIF_VALUE: 20
PIF_VALUE: 28
PIF_VALUE: 29
PIF_VALUE: 27
PIF_VALUE: 18
PIF_VALUE: 22
PIF_VALUE: 26
PIF_VALUE: 21
PIF_VALUE: 23
PIF_VALUE: 21
PIF_VALUE: 22
PIF_VALUE: 27
PIF_VALUE: 26
PIF_VALUE: 28
PIF_VALUE: 21
PIF_VALUE: 22
PIF_VALUE: 20
PIF_VALUE: 19
PIF_VALUE: 20
PIF_VALUE: 26
PIF_VALUE: 27
PIF_VALUE: 26
PIF_VALUE: 26
PIF_VALUE: 22
PIF_VALUE: 19
PIF_VALUE: 26
PIF_VALUE: 21
PIF_VALUE: 18
PIF_VALUE: 26
PIF_VALUE: 20
PIF_VALUE: 22
PIF_VALUE: 20
PIF_VALUE: 19
PIF_VALUE: 20
PIF_VALUE: 26
PIF_VALUE: 26
PIF_VALUE: 22
PIF_VALUE: 28
PIF_VALUE: 21
PIF_VALUE: 26
PIF_VALUE: 30
PIF_VALUE: 19
PIF_VALUE: 22
PIF_VALUE: 26
PIF_VALUE: 26
PIF_VALUE: 19
PIF_VALUE: 26
PIF_VALUE: 20
PIF_VALUE: 28
PIF_VALUE: 26
PIF_VALUE: 26
PIF_VALUE: 18
PIF_VALUE: 26
PIF_VALUE: 20
PIF_VALUE: 22
PIF_VALUE: 21
PIF_VALUE: 25
PIF_VALUE: 23
PIF_VALUE: 27
PIF_VALUE: 27
PIF_VALUE: 23
PIF_VALUE: 20
PIF_VALUE: 24
PIF_VALUE: 24
PIF_VALUE: 21
PIF_VALUE: 26
PIF_VALUE: 22
PIF_VALUE: 21
PIF_VALUE: 22
PIF_VALUE: 27
PIF_VALUE: 22
PIF_VALUE: 25
PIF_VALUE: 26
PIF_VALUE: 22
PIF_VALUE: 27
PIF_VALUE: 25
PIF_VALUE: 19
PIF_VALUE: 20
PIF_VALUE: 26
PIF_VALUE: 26
PIF_VALUE: 19
PIF_VALUE: 28
PIF_VALUE: 3
PIF_VALUE: 26
PIF_VALUE: 22
PIF_VALUE: 20
PIF_VALUE: 20
PIF_VALUE: 27
PIF_VALUE: 21
PIF_VALUE: 22
PIF_VALUE: 25
PIF_VALUE: 18
PIF_VALUE: 18
PIF_VALUE: 23
PIF_VALUE: 26
PIF_VALUE: 25
PIF_VALUE: 27

## 2018-09-25 ASSESSMENT — PAIN - FUNCTIONAL ASSESSMENT: PAIN_FUNCTIONAL_ASSESSMENT: 0-10

## 2018-09-25 ASSESSMENT — PAIN SCALES - GENERAL
PAINLEVEL_OUTOF10: 9
PAINLEVEL_OUTOF10: 8
PAINLEVEL_OUTOF10: 9
PAINLEVEL_OUTOF10: 10
PAINLEVEL_OUTOF10: 9
PAINLEVEL_OUTOF10: 4

## 2018-09-25 ASSESSMENT — PAIN DESCRIPTION - DESCRIPTORS: DESCRIPTORS: SHARP

## 2018-09-25 ASSESSMENT — ENCOUNTER SYMPTOMS: SHORTNESS OF BREATH: 0

## 2018-09-25 NOTE — INTERVAL H&P NOTE
History and Physical exam reviewed, the patient interviewed and re examined. There have been no interval changes.   Electronically signed by Davin Grady MD on 9/25/2018 at 7:28 AM

## 2018-09-25 NOTE — H&P (VIEW-ONLY)
Patient ID: Nayeli Jones is a 77 y.o. female. HPI  Nayeli Jones is here for re evaluation of a colovesical fistula. She has a 3 year history of frequent UTIs and she has recently developed pneumaturia after the colonoscopy. She gets intermittent sharp cramping suprapubic pain that is non-radiating and is a 9 out of 10. She has a NICK and BSO. CT urogram on 7/2/2018 was normal. She had a cystoscopy on 8/3/2018  that showed there is an area of inflammatory changes in the right posterior bladder wall 1-2 cm in size and with a solid appearing polypoid lesion on a thin base in the center of this area. She has had a colonoscopy done on 8/8/2018 showed a sharp angulation versus fixed angulation noted at the rectosigmoid junction, as noted above there. The colonoscope could not be advanced past this, a upper endoscope was utilized to traverse this area. No obvious diverticula were noted. Sub optimal prep. CT scan on 8/31/2018 showed   DIVERTICULA WITH WALL THICKENING IN THE SIGMOID COLON. THERE IS STRANDING POSTERIOR TO THE MID SIGMOID COLON. FINDINGS ARE CONSISTENT WITH ACUTE DIVERTICULITIS. THERE APPEARS TO BE A FISTULOUS TRACT EXTENDING FROM THE MID SIGMOID COLON    POSTERIORLY. THE TRACT EXTENDS POSTERIOR TO THE VAGINA AND ANTERIOR TO THE RECTUM. THERE COULD BE A FISTULA BETWEEN EITHER ONE OF THESE STRUCTURES OR BOTH. She denies any vaginal drainage but does feel pressure      Review of Systems   Constitutional: Negative for activity change, appetite change and unexpected weight change. HENT: Negative for congestion, nosebleeds, rhinorrhea and sneezing. Eyes: Negative for visual disturbance. Respiratory: Negative for chest tightness and shortness of breath. Cardiovascular: Negative for chest pain and leg swelling. Gastrointestinal: Positive for abdominal pain. Negative for abdominal distention, blood in stool, nausea and rectal pain. Endocrine: Negative.     Genitourinary: Positive for

## 2018-09-25 NOTE — BRIEF OP NOTE
Brief Postoperative Note  ______________________________________________________________    Patient: Bo Renee  YOB: 1950  MRN: [id number]  Date of Procedure: 9/25/2018    Pre-Op Diagnosis: COLOVESICLE FISTULA     Post-Op Diagnosis: Same       Procedure(s):  SIGMOID RESECTION WITH END SIGMOID COLOSTOMY  CYSTOSCOPY BILATERAL RETROGRADE PYELOGRAM BILATERAL URETERAL STENT PLACEMENT    Anesthesia: General    Surgeon(s):  MD Jomar Sears MD Tobe Ouch, MD    Staff:  Surgeons and assistants    Estimated Blood Loss: 138 cc    Complications: None    Specimens:   ID Type Source Tests Collected by Time Destination   A : Sigmoid Colon Tissue Sigmoid Colon SURGICAL PATHOLOGY Jomar Hills MD 9/25/2018 1108        Implants:  * No implants in log *      Drains:   Closed/Suction Drain Midline Abdomen Bulb 15 [place] (Active)       Urethral Catheter Temperature probe 16 fr (Active)       Findings: extensive pelvic fibrosis    Jomar Hills MD  Date: 9/25/2018  Time: 12:47 PM

## 2018-09-25 NOTE — BRIEF OP NOTE
Brief Postoperative Note    Harmeet Newton  YOB: 1950  [id number]    Pre-operative Diagnosis: [place]-vesical fistula    Post-operative Diagnosis: Same    Procedure: Cystoscopy with bilateral retrogrades and bilateral ureteral stent placement 5 Fr    Anesthesia: GA     Surgeons/Assistants: EVA Ross     Estimated Blood Loss: Minimal    Complications: None    Specimens: None    Findings: Fistula noted in Lt posterior bladder wall 2-3 cm above Lt U/O with inflammatory reaction of bladder noted. Retrogrades showed no hydronephrosis or filling defects. Stents were placed into UP of kidneys bilaterally.      Electronically signed by Michele Begum MD on 9/25/2018 at 7:54 AM

## 2018-09-25 NOTE — ANESTHESIA PRE PROCEDURE
times per day Javier Hamming, APRN - CNP        sodium chloride flush 0.9 % injection 10 mL  10 mL Intravenous PRN Javier Hamming, APRN - CNP        alvimopan (ENTEREG) capsule 12 mg  12 mg Oral Once Javier Hamming, APRN - CNP        ciprofloxacin (CIPRO) IVPB 400 mg  400 mg Intravenous Once Javier Hamming, APRN - CNP        clindamycin (CLEOCIN) 600 mg in dextrose 5 % 50 mL IVPB  600 mg Intravenous Once Javier Hamming, APRN - CNP        ketorolac (TORADOL) injection 30 mg  30 mg Intravenous Once Javier Hamming, APRN - CNP           Allergies: Allergies   Allergen Reactions    Pcn [Penicillins]      Pt states she has taken PCN without any difficulty       Problem List:    Patient Active Problem List   Diagnosis Code    Hypothyroidism E03.9    Basal cell carcinoma of eyelid C44. 111    Heart palpitations R00.2    Recurrent urinary tract infection N39.0    [place]-vesical fistula N32.1    Sigmoid diverticulitis K57.32    Colovaginal fistula N82.4       Past Medical History:        Diagnosis Date    Cancer [facility])     Chest pain in adult 4/23/2016    Heart palpitations 4/28/2016    Hypothyroidism     meds > 5 yrs    Other chronic cystitis with hematuria     SOB (shortness of breath) 4/23/2016       Past Surgical History:        Procedure Laterality Date    COLONOSCOPY  2007    (-)    COLONOSCOPY  06/20/14    DR. BLACK-polyp    [place]STOSCOPY  2012    [place]    GALLBLADDER SURGERY      HYSTERECTOMY      MOHS SURGERY Left 06/22/2017    Nicola Jensen MD [place]       Social History:    Social History   Substance Use Topics    Smoking status: Never Smoker    Smokeless tobacco: Never Used    Alcohol use No                                Counseling given: Not Answered      Vital Signs (Current):   Vitals:    09/25/18 0602   BP: (!) 99/56   Pulse: 103   Resp: 16   Temp: 97.3 °F (36.3 °C)   TempSrc: Temporal   SpO2: 95%                                              BP Readings from tolerance: good (>4 METS),         ECG reviewed  Rhythm: regular    Echocardiogram reviewed         Beta Blocker:  Dose within 24 Hrs         Neuro/Psych:   Negative Neuro/Psych ROS              GI/Hepatic/Renal: Neg GI/Hepatic/Renal ROS            Endo/Other:    (+) hypothyroidism::., .                 Abdominal:           Vascular: negative vascular ROS. Anesthesia Plan      general and epidural     ASA 2       Induction: intravenous. MIPS: Postoperative opioids intended and Prophylactic antiemetics administered. Anesthetic plan and risks discussed with patient. Use of blood products discussed with patient whom consented to blood products. Plan discussed with CRNA.     Attending anesthesiologist reviewed and agrees with Pre Eval content              Sotero Dakin, MD   9/25/2018

## 2018-09-25 NOTE — ANESTHESIA POSTPROCEDURE EVALUATION
Department of Anesthesiology  Postprocedure Note    Patient: Marie Gomes  MRN: [id number]  [place]: 1950  Date of evaluation: 9/25/2018  Time:  1:06 PM     Procedure Summary     Date:  09/25/18 Room / Location:  [facility] OR 09 / [facility] OR    Anesthesia Start:  6260 Anesthesia Stop:      Procedures:       SIGMOID RESECTION WITH END SIGMOID COLOSTOMY (N/A )      CYSTOSCOPY BILATERAL RETROGRADE PYELOGRAM BILATERAL URETERAL STENT PLACEMENT (Bilateral ) Diagnosis:  (COLOVESICLE FISTULA )    Surgeon:  Sylvie Damon MD; Franklyn Frost MD Responsible Provider:  Dominic Buck MD    Anesthesia Type:  general, epidural ASA Status:  2          Anesthesia Type: general, epidural    Stephanie Phase I: Stephanie Score: 10    Stephanie Phase II:      Last vitals: Reviewed and per EMR flowsheets. Anesthesia Post Evaluation    Patient location during evaluation: PACU  Patient participation: complete - patient participated  Level of consciousness: awake  Pain score: 0  Airway patency: patent  Nausea & Vomiting: no nausea and no vomiting  Complications: no  Cardiovascular status: hemodynamically stable  Respiratory status: acceptable  Hydration status: euvolemic  Comments: Patient seen in PACU for Pain 9/10. Epidural bolus bupivacaine 0.25% 5 ml. Patient reports some improvement in incisional pain. Pain likely secondary to ureteral stents. Will give Toradol 30 mg.

## 2018-09-26 LAB
ALBUMIN SERPL-MCNC: 2.5 G/DL (ref 3.9–4.9)
ALP BLD-CCNC: 35 U/L (ref 40–130)
ALT SERPL-CCNC: 18 U/L (ref 0–33)
ANION GAP SERPL CALCULATED.3IONS-SCNC: 9 MEQ/L (ref 7–13)
AST SERPL-CCNC: 19 U/L (ref 0–35)
BILIRUB SERPL-MCNC: 0.3 MG/DL (ref 0–1.2)
BUN BLDV-MCNC: 17 MG/DL (ref 8–23)
CALCIUM SERPL-MCNC: 7.1 MG/DL (ref 8.6–10.2)
CHLORIDE BLD-SCNC: 109 MEQ/L (ref 98–107)
CO2: 20 MEQ/L (ref 22–29)
CREAT SERPL-MCNC: 0.83 MG/DL (ref 0.5–0.9)
GFR AFRICAN AMERICAN: >60
GFR NON-AFRICAN AMERICAN: >60
GLOBULIN: 1.7 G/DL (ref 2.3–3.5)
GLUCOSE BLD-MCNC: 161 MG/DL (ref 74–109)
HCT VFR BLD CALC: 24.6 % (ref 37–47)
HEMOGLOBIN: 8.2 G/DL (ref 12–16)
MCH RBC QN AUTO: 29.2 PG (ref 27–31.3)
MCHC RBC AUTO-ENTMCNC: 33.3 % (ref 33–37)
MCV RBC AUTO: 87.6 FL (ref 82–100)
PDW BLD-RTO: 13.5 % (ref 11.5–14.5)
PLATELET # BLD: 185 K/UL (ref 130–400)
POTASSIUM REFLEX MAGNESIUM: 4.1 MEQ/L (ref 3.5–5.1)
RBC # BLD: 2.81 M/UL (ref 4.2–5.4)
SODIUM BLD-SCNC: 138 MEQ/L (ref 132–144)
TOTAL PROTEIN: 4.2 G/DL (ref 6.4–8.1)
WBC # BLD: 13.7 K/UL (ref 4.8–10.8)

## 2018-09-26 PROCEDURE — 6360000002 HC RX W HCPCS: Performed by: INTERNAL MEDICINE

## 2018-09-26 PROCEDURE — 99212 OFFICE O/P EST SF 10 MIN: CPT

## 2018-09-26 PROCEDURE — 6360000002 HC RX W HCPCS: Performed by: SURGERY

## 2018-09-26 PROCEDURE — 36415 COLL VENOUS BLD VENIPUNCTURE: CPT

## 2018-09-26 PROCEDURE — 99024 POSTOP FOLLOW-UP VISIT: CPT | Performed by: SURGERY

## 2018-09-26 PROCEDURE — 80053 COMPREHEN METABOLIC PANEL: CPT

## 2018-09-26 PROCEDURE — 6360000002 HC RX W HCPCS: Performed by: STUDENT IN AN ORGANIZED HEALTH CARE EDUCATION/TRAINING PROGRAM

## 2018-09-26 PROCEDURE — 2500000003 HC RX 250 WO HCPCS: Performed by: SURGERY

## 2018-09-26 PROCEDURE — 6370000000 HC RX 637 (ALT 250 FOR IP): Performed by: SURGERY

## 2018-09-26 PROCEDURE — 99231 SBSQ HOSP IP/OBS SF/LOW 25: CPT | Performed by: UROLOGY

## 2018-09-26 PROCEDURE — 2580000003 HC RX 258: Performed by: SURGERY

## 2018-09-26 PROCEDURE — 94150 VITAL CAPACITY TEST: CPT

## 2018-09-26 PROCEDURE — 1210000000 HC MED SURG R&B

## 2018-09-26 PROCEDURE — 6360000002 HC RX W HCPCS: Performed by: ANESTHESIOLOGY

## 2018-09-26 PROCEDURE — 85027 COMPLETE CBC AUTOMATED: CPT

## 2018-09-26 PROCEDURE — 2580000003 HC RX 258: Performed by: STUDENT IN AN ORGANIZED HEALTH CARE EDUCATION/TRAINING PROGRAM

## 2018-09-26 RX ORDER — METOPROLOL SUCCINATE 50 MG/1
TABLET, EXTENDED RELEASE ORAL
Qty: 30 TABLET | Refills: 0 | Status: SHIPPED | OUTPATIENT
Start: 2018-09-26 | End: 2018-10-08 | Stop reason: SDUPTHER

## 2018-09-26 RX ORDER — ATROPA BELLADONNA AND OPIUM 16.2; 6 MG/1; MG/1
60 SUPPOSITORY RECTAL EVERY 8 HOURS PRN
Status: DISCONTINUED | OUTPATIENT
Start: 2018-09-26 | End: 2018-09-26 | Stop reason: RX

## 2018-09-26 RX ORDER — SODIUM CHLORIDE 9 MG/ML
INJECTION, SOLUTION INTRAVENOUS ONCE
Status: COMPLETED | OUTPATIENT
Start: 2018-09-26 | End: 2018-09-26

## 2018-09-26 RX ORDER — KETOROLAC TROMETHAMINE 15 MG/ML
15 INJECTION, SOLUTION INTRAMUSCULAR; INTRAVENOUS EVERY 6 HOURS PRN
Status: DISCONTINUED | OUTPATIENT
Start: 2018-09-26 | End: 2018-09-26

## 2018-09-26 RX ORDER — SODIUM CHLORIDE 9 MG/ML
INJECTION, SOLUTION INTRAVENOUS
Status: DISPENSED
Start: 2018-09-26 | End: 2018-09-26

## 2018-09-26 RX ORDER — KETOROLAC TROMETHAMINE 30 MG/ML
30 INJECTION, SOLUTION INTRAMUSCULAR; INTRAVENOUS EVERY 6 HOURS PRN
Status: DISCONTINUED | OUTPATIENT
Start: 2018-09-26 | End: 2018-09-26 | Stop reason: SDUPTHER

## 2018-09-26 RX ORDER — 0.9 % SODIUM CHLORIDE 0.9 %
500 INTRAVENOUS SOLUTION INTRAVENOUS ONCE
Status: COMPLETED | OUTPATIENT
Start: 2018-09-26 | End: 2018-09-26

## 2018-09-26 RX ORDER — KETOROLAC TROMETHAMINE 15 MG/ML
15 INJECTION, SOLUTION INTRAMUSCULAR; INTRAVENOUS EVERY 6 HOURS PRN
Status: DISPENSED | OUTPATIENT
Start: 2018-09-26 | End: 2018-09-30

## 2018-09-26 RX ADMIN — KETOROLAC TROMETHAMINE 15 MG: 15 INJECTION, SOLUTION INTRAMUSCULAR; INTRAVENOUS at 10:56

## 2018-09-26 RX ADMIN — ONDANSETRON HYDROCHLORIDE 4 MG: 2 INJECTION, SOLUTION INTRAMUSCULAR; INTRAVENOUS at 23:33

## 2018-09-26 RX ADMIN — KETOROLAC TROMETHAMINE 15 MG: 15 INJECTION, SOLUTION INTRAMUSCULAR; INTRAVENOUS at 04:50

## 2018-09-26 RX ADMIN — KETOROLAC TROMETHAMINE 15 MG: 15 INJECTION, SOLUTION INTRAMUSCULAR; INTRAVENOUS at 11:10

## 2018-09-26 RX ADMIN — ROPIVACAINE HYDROCHLORIDE: 5 INJECTION, SOLUTION EPIDURAL; INFILTRATION; PERINEURAL at 18:41

## 2018-09-26 RX ADMIN — POTASSIUM CHLORIDE, DEXTROSE MONOHYDRATE AND SODIUM CHLORIDE: 150; 5; 450 INJECTION, SOLUTION INTRAVENOUS at 04:50

## 2018-09-26 RX ADMIN — KETOROLAC TROMETHAMINE 15 MG: 15 INJECTION, SOLUTION INTRAMUSCULAR; INTRAVENOUS at 17:48

## 2018-09-26 RX ADMIN — KETOROLAC TROMETHAMINE 15 MG: 15 INJECTION, SOLUTION INTRAMUSCULAR; INTRAVENOUS at 23:38

## 2018-09-26 RX ADMIN — Medication 10 ML: at 20:39

## 2018-09-26 RX ADMIN — LEVOTHYROXINE SODIUM 125 MCG: 125 TABLET ORAL at 08:37

## 2018-09-26 RX ADMIN — METRONIDAZOLE 500 MG: 500 INJECTION, SOLUTION INTRAVENOUS at 02:39

## 2018-09-26 RX ADMIN — SODIUM CHLORIDE 500 ML: 9 INJECTION, SOLUTION INTRAVENOUS at 00:30

## 2018-09-26 RX ADMIN — ACETAMINOPHEN 650 MG: 325 TABLET ORAL at 17:48

## 2018-09-26 RX ADMIN — POTASSIUM CHLORIDE, DEXTROSE MONOHYDRATE AND SODIUM CHLORIDE: 150; 5; 450 INJECTION, SOLUTION INTRAVENOUS at 20:39

## 2018-09-26 RX ADMIN — POTASSIUM CHLORIDE, DEXTROSE MONOHYDRATE AND SODIUM CHLORIDE: 150; 5; 450 INJECTION, SOLUTION INTRAVENOUS at 12:30

## 2018-09-26 RX ADMIN — ENOXAPARIN SODIUM 40 MG: 40 INJECTION SUBCUTANEOUS at 20:39

## 2018-09-26 RX ADMIN — ROPIVACAINE HYDROCHLORIDE: 5 INJECTION, SOLUTION EPIDURAL; INFILTRATION; PERINEURAL at 08:34

## 2018-09-26 RX ADMIN — SODIUM CHLORIDE: 9 INJECTION, SOLUTION INTRAVENOUS at 09:42

## 2018-09-26 ASSESSMENT — PAIN DESCRIPTION - PAIN TYPE: TYPE: SURGICAL PAIN

## 2018-09-26 ASSESSMENT — PAIN SCALES - GENERAL
PAINLEVEL_OUTOF10: 9
PAINLEVEL_OUTOF10: 8
PAINLEVEL_OUTOF10: 7

## 2018-09-26 ASSESSMENT — PAIN DESCRIPTION - LOCATION: LOCATION: ABDOMEN

## 2018-09-26 NOTE — OP NOTE
Glidewire was  advanced into the distal ureter fluoroscopically. The end-hole catheter  was advanced over this. The Glidewire was removed. A left retrograde  pyelogram was performed, which showed a normal-appearing ureter and  intrarenal collecting system without filling defects or dilation. The  Glidewire was then advanced into the pole of the left kidney. The 5-Cymraes  end-hole catheter was advanced over the Glidewire into the upper pole of  the left kidney. The Glidewire was removed. The cystoscope was back-loaded off this ureteral catheter. A 16-Cymraes  Martinez catheter was inserted by myself into the bladder. Clean urine was  evacuated from the bladder. Then, 10 mL of sterile water was placed in the balloon. The ureteral  catheters were then attached with a ureteral catheter attachment device to  the Martinez catheter. The ureteral catheters were then secured to the Martinez  catheter with a 0 silk suture and catheter clips. At the conclusion of this, the patient was prepped and draped for Dr. Vladimir Masters portion of the case, which will be dictated by Dr. Uday Acosta. Of note, I was later called back into the operating room by Dr. Uday Acosta as  he wanted confirmation that he had taken down the patient's fistula and was  requesting a repeat cystoscopy. The cystoscope had been kept sterile on  the sterile back table and was re-prepared appropriately. The balloon was  deflated from the Martinez catheter. The Martinez catheter was removed. The  ureteral stents were still in place. The cystoscope was passed along the  ureteral stents back into the bladder. The area of the fistulous tract was  again identified.   The bladder was filled with fluid; and using the light  from the cystoscope, Dr. Uday Acosta was able to determine with digital  palpation that he had dissected the diseased colon away from the fistulous  tract involving the bladder, and there appear to be no leakage of infusion  from the bladder. At this point, after assuring that he had dissected the colon away from the  bladder, the cystoscope was removed. The Martinez catheter was reinserted  through the urethra into the bladder and 10 mL of sterile water were placed  back into the balloon and it drained a clear urine. Of note, during the second cystoscopy, the sterile field had been  maintained in the periurethral area.         Yao Denise MD    D: 09/25/2018 13:46:14       T: 09/25/2018 15:38:08     [email or web address]  Job#: [id number]     Doc#: [id number]    CC:

## 2018-09-26 NOTE — PROGRESS NOTES
Dr. Guero Randle advised to speak with anesthesia, anesthesia not concerned with pressures as of now. Will continue to monitor.      Electronically signed by Marcelo Salazar RN on 9/26/2018 at 3:17 AM

## 2018-09-26 NOTE — PROGRESS NOTES
by Dr. Carlos Cotton for Colovesical fistula. Patient in bed, with daughter at bedside. Patient is having pain in her pubic area, unrelieved by the epideral. Patient states she informed Carlos Avila this morning and anesthesia is supposed to come up to see patient. Stoma in the RLQ of the abdomen. Stoma is dark red, moist, and protrudes slightly. Mucocutaneous junction appears to be intact. Patient is currently in a [facility] 2.25\" New Image system, which is clean dry and intact. Plan to bring some supplies up for patient tomorrow, and initiate eduction Friday Morning with patient and patient's daughter. Patient willing to look at her stoma at this time, but unable to see due to abdominal distention. Intake/Output Summary (Last 24 hours) at 09/26/18 1031  Last data filed at 09/26/18 0534   Gross per 24 hour   Intake           6185.6 ml   Output             1125 ml   Net           5060.6 ml       Plan   Plan for Ostomy Care: Patient is currently in a [facility] 2.25\" New Image system, which is clean dry and intact. Plan to bring some supplies up for patient tomorrow, and initiate eduction Friday Morning with patient and patient's daughter.     Ostomy Plan of Care  [] Supplies/Instructions left in room  [] Patient using home supplies  [] Brand/supplies at bedside     Current Diet: Diet NPO Effective Now Exceptions are: Ice Chips, Sips with Meds  Dietician consult:  No    Discharge Plan:  Placement for patient upon discharge: home with support    Outpatient visit plan No  Supplies given No   Samples requested No    Referrals:  []   [] [facility]  [] Supplies  [] Other      Patient/Caregiver Teaching:  Written Instructions given to patient/family: Patient  Teaching provided:  [x] Reviewed GI and A&P        [] Supplies  [] Pouch emptying      [] Manipulate closure  [] Routine Care         [] Comment  [] Pouch maintenance           Level of patient/caregiver understanding able to:  [] Indicates

## 2018-09-26 NOTE — PROGRESS NOTES
Pt assessment completed. Pt alert and oriented. Fentanyl PCA is running at 10 mL/hr. Pt complaining of pain in groin area. Anesthesia notified and PRN toradol ordered and given. Stent was coming disconnected from glover and leaking, pt cleaned up and this issue is resolved. Glover draining pink tinged urine. Abdominal dressing is clean and intact with abdominal binder in place. Flow trons on and working. Colostomy draining green liquid. GHAZALA in place with serosanguinous fluid draining. Pts BP was 100/45 HR elevated. Spoke with Dr. Guero Randle, ordered a 500 mL NS bolus. BP re-checked and is 95/42. Dr. Guero Randle notified via perfectserve, no new orders. Will continue to monitor.      Electronically signed by Marcelo Salazar RN on 9/26/2018 at 2:58 AM

## 2018-09-26 NOTE — PROGRESS NOTES
Progress Note    9/26/2018   3:51 PM    Name:  Rahel Leahy  MRN:    [id number]     [place]:     [de-identified]   Room:  [address] Day: 1     Admit Date: 9/25/2018  5:46 AM  PCP: Esther Reyes MD    Subjective:     C/C: No chief complaint on file. Interval History: Status: this is a 77 female with colo-vesical fistula s/p colectomy with colostomy and cystoscopy with bilateral ureteral stents POD #1 and is currently having intermittent leakage around the Martinez catheter. The urine is clear and she has no other complaints. The stents are still in place. Past Medical History:   Diagnosis Date    Cancer [facility])     Chest pain in adult 4/23/2016    Heart palpitations 4/28/2016    Hypothyroidism     meds > 5 yrs    Other chronic cystitis with hematuria     SOB (shortness of breath) 4/23/2016       ROS:  Review of Systems   Genitourinary: Negative for flank pain and hematuria. Medications: Allergies:    Allergies   Allergen Reactions    Pcn [Penicillins]      Pt states she has taken PCN without any difficulty       Current Meds:   opium-belladonna (B&O SUPPRETTES) 16.2-60 MG suppository 60 mg Q8H PRN   naloxone (NARCAN) injection 0.4 mg PRN   ondansetron (ZOFRAN) injection 4 mg Q6H PRN   fentaNYL (SUBLIMAZE) 2 mcg/mL, ropivacaine (NAROPIN) 0.1 % in sodium chloride 0.9 % 100 mL epidural Continuous   levothyroxine (SYNTHROID) tablet 125 mcg Daily   ondansetron (ZOFRAN) tablet 4 mg Q8H PRN   dextrose 5 % and 0.45 % NaCl with KCl 20 mEq infusion Continuous   sodium chloride flush 0.9 % injection 10 mL 2 times per day   sodium chloride flush 0.9 % injection 10 mL PRN   acetaminophen (TYLENOL) tablet 650 mg Q4H PRN   ondansetron (ZOFRAN) injection 4 mg Q6H PRN   enoxaparin (LOVENOX) injection 40 mg Daily       Data:     Code Status:  Full Code    Family History   Problem Relation Age of Onset    Cancer Mother     Colon Cancer Brother        Social History     Social History    Marital status:      Spouse name: N/A    Number of children: N/A    Years of education: N/A     Occupational History    Not on file. Social History Main Topics    Smoking status: Never Smoker    Smokeless tobacco: Never Used    Alcohol use No    Drug use: No    Sexual activity: Not on file     Other Topics Concern    Not on file     Social History Narrative    No narrative on file       Vitals:  BP (!) 109/45   Pulse 96   Temp 98.8 °F (37.1 °C) (Oral)   Resp 16   LMP  (LMP Unknown)   SpO2 96%   Temp (24hrs), Av.6 °F (37 °C), Min:97.9 °F (36.6 °C), Max:99.7 °F (37.6 °C)    No results for input(s): POCGLU in the last 72 hours. I/O (24Hr): Intake/Output Summary (Last 24 hours) at 18 1551  Last data filed at 18 1423   Gross per 24 hour   Intake           2485.6 ml   Output              480 ml   Net           2005.6 ml       Labs:  Hematology:  Recent Labs      18   0636   WBC  13.7*   HGB  8.2*   HCT  24.6*   PLT  185     Chemistry:  Recent Labs      18   0636   NA  138   K  4.1   CL  109*   CO2  20*   GLUCOSE  161*   BUN  17   CREATININE  0.83   ANIONGAP  9   LABGLOM  >60.0   GFRAA  >60.0   CALCIUM  7.1*       Urine Culture   Lab Results   Component Value Date    LABURIN >100,000 CFU/ml 2018         Physical Examination:        Physical Exam   Constitutional: She appears well-developed and well-nourished. Abdominal: She exhibits distension. Genitourinary:   Genitourinary Comments: Urine is clear and draining well in Glover bag   Neurological: She is alert. Psychiatric: She has a normal mood and affect. Her behavior is normal.       Assessment:        Primary Problem       This is a 76 yo female with colovesical fistula and UTI's now POD #1 s/p colectomy and colostomy with bilateral ureteral stents still in place. The urine is clear and the glover is draining well.  She is likely having bladder spasms due to stents and glover catheter as the cause for the

## 2018-09-26 NOTE — PROGRESS NOTES
Pt Name: Erwin Ramirez  Medical Record Number: [id number]  Date of Birth 1950   Admit date 9/25/2018  5:46 AM  Today's Date: 9/26/2018     ASSESSMENT  1. Post op day # 1  2  Erwin Ramirez had Procedure(s):  SIGMOID RESECTION WITH END SIGMOID COLOSTOMY  CYSTOSCOPY BILATERAL RETROGRADE PYELOGRAM BILATERAL URETERAL STENT PLACEMENT  3. Poor pain control  4. Hypotension secondary to the epidural  5. Acute blood loss anemia    PLAN  1. Anesthesia to evaluate      SUBJECTIVE  Chief complaint: pelvic pain  Afebrile, vital signs are stable. She denies any nausea or vomiting, has passed flatus and had a bowel movement through her stoma. She is tolerating a Diet NPO Effective Now Exceptions are: Ice Chips, Sips with Meds. Her pain is well controlled on current medications. She has been ambulating in the halls. has a past medical history of Cancer ([facility] [address] Chest pain in adult; Heart palpitations; Hypothyroidism; Other chronic cystitis with hematuria; and SOB (shortness of breath). CURRENT MEDS  Scheduled Meds:   levothyroxine  125 mcg Oral Daily    sodium chloride flush  10 mL Intravenous 2 times per day    enoxaparin  40 mg Subcutaneous Daily     Continuous Infusions:   fentanyl epidural builder 10 mL/hr at 09/26/18 0834    dextrose 5% and 0.45% NaCl with KCl 20 mEq 125 mL/hr at 09/26/18 1230     PRN Meds:.opium-belladonna, ketorolac, naloxone, ondansetron, ondansetron, sodium chloride flush, acetaminophen, ondansetron    OBJECTIVE  CURRENT VITALS:  oral temperature is 98.8 °F (37.1 °C). Her blood pressure is 109/45 (abnormal) and her pulse is 96. Her respiration is 16 and oxygen saturation is 96%.      GENERAL: alert, no distress, glover catheter in place with stents  LUNGS: clear to ausculation, without wheezes, rales or rhonci  HEART: normal rate and regular rhythm  ABDOMEN: non-distended, soft, incisional tenderness, bowel sounds present in all 4 quadrants and no guarding or peritoneal signs, GHAZALA retrograde pyelography, including images showing presence of retrograde guidewire. There is no sign of obstruction. There are multiple filling defects compatible with injected air. There is no sign of obstruction/dilation. There is no stricture. There is no discernible mass.  CONCLUSION: UNREMARKABLE RETROGRADE PYELOGRAPHY INTRAOPERATIVE DOCUMENTATION    Electronically signed by Fabiana Mg MD on 9/26/2018 at 5:50 PM

## 2018-09-26 NOTE — CONSULTS
Department of Internal Medicine   Consult Note      Reason for Consult:  Hypotension and medical managment  Requesting Physician:  Dr Gleason Herb:  Colorectal and colovaginal fistula    History Obtained From:  patient    HISTORY OF PRESENT ILLNESS:                The patient is a 77 y.o. female with significant past medical history of hypothyroidism and colorectal and colovaginal fistula and sigmoid diverticulitis who is s/p  Exploratory laparotomy with sigmoid resection and sigmoid colostomy. Consultation requested for post op medical management. Patient is hypotensive but responded to fluid bolus. She is afebrile. She denies any cough, phlegm , chest pain or shortness of breath. Patient does not have history of CAD, HTN. As per her, she use to have panic attacks after her son  2 years ago with palpitations and hence was started on lopressor. Past Medical History:        Diagnosis Date    Cancer [facility])     Chest pain in adult 2016    Heart palpitations 2016    Hypothyroidism     meds > 5 yrs    Other chronic cystitis with hematuria     SOB (shortness of breath) 2016     Past Surgical History:        Procedure Laterality Date    COLONOSCOPY      (-)    COLONOSCOPY  14    DR. BLACK-polyp    [place]STOSCOPY      [place]    GALLBLADDER SURGERY      HYSTERECTOMY      MOHS SURGERY Left 2017    Deja Randall MD [place]    [place] Catheter, ureteral Bilateral 2018    CYSTOSCOPY BILATERAL RETROGRADE PYELOGRAM BILATERAL URETERAL STENT PLACEMENT performed by Zunilda Roberts MD at [address] [place] [address] COLON/ILEOSTOMY N/A 2018    SIGMOID RESECTION WITH END SIGMOID COLOSTOMY performed by Marylene Bye, MD at [facility]     Current Medications:    Current Facility-Administered Medications: naloxone (NARCAN) injection 0.4 mg, 0.4 mg, Intravenous, PRN  ondansetron (ZOFRAN) injection 4 mg, 4 mg, Intravenous, Q6H PRN  fentaNYL (SUBLIMAZE) 2 mcg/mL, ropivacaine non-tender on palpation, paraspinous muscles are non-tender on palpation, no costal vertebral tenderness  LUNGS:  No increased work of breathing, good air exchange, clear to auscultation bilaterally, no crackles or wheezing  CARDIOVASCULAR:  Normal apical impulse, regular rate and rhythm, normal S1 and S2, no S3 or S4, and no murmur noted  ABDOMEN:  Ostomy bag in place  MUSCULOSKELETAL:  There is no redness, warmth, or swelling of the joints. Full range of motion noted. Motor strength is 5 out of 5 all extremities bilaterally. Tone is normal.  NEUROLOGIC:  Awake, alert, oriented to name, place and time. Cranial nerves II-XII are grossly intact. Motor is 5 out of 5 bilaterally. Cerebellar finger to nose, heel to shin intact. Sensory is intact. Babinski down going, Romberg negative, and gait is normal.  SKIN:  no bruising or bleeding, normal skin color, texture, turgor, no redness, warmth, or swelling, no rashes and no lesions  DATA:    LABS  Recent Labs      09/25/18   1345  09/26/18   0636   WBC  11.5*  13.7*   RBC  3.48*  2.81*   HGB  10.1*  8.2*   HCT  30.8*  24.6*   MCV  88.6  87.6   MCH  29.0  29.2   MCHC  32.7*  33.3   RDW  13.6  13.5   PLT  217  185       Recent Labs      09/25/18   1345  09/26/18   0636   NA  140  138   K  3.8  4.1   CL  112*  109*   CO2  18*  20*   BUN  11  17   CREATININE  0.57  0.83   GLUCOSE  158*  161*   CALCIUM  7.0*  7.1*       No results for input(s): MG in the last 72 hours. IMPRESSION/RECOMMENDATIONS:    Active Hospital Problems    Diagnosis Date Noted    Colovesical fistula [N32.1] 09/25/2018    Colovaginal fistula [N82.4] 09/19/2018    Sigmoid diverticulitis [K57.32] 09/06/2018    [place]-vesical fistula [N32.1] 08/27/2018    Hypothyroidism [E03.9]      Hypotension: Continue IVF. No signs of infection. Patient is afebrile. Leukocytosis is mostly reactive.  Lopressor discontinued  Hypothyroidism:Continue Synthroid    Uyen Nascimento MD  Pager : [phone]

## 2018-09-27 LAB
ALBUMIN SERPL-MCNC: 2.5 G/DL (ref 3.9–4.9)
ALP BLD-CCNC: 43 U/L (ref 40–130)
ALT SERPL-CCNC: 15 U/L (ref 0–33)
ANION GAP SERPL CALCULATED.3IONS-SCNC: 9 MEQ/L (ref 7–13)
AST SERPL-CCNC: 17 U/L (ref 0–35)
BILIRUB SERPL-MCNC: <0.2 MG/DL (ref 0–1.2)
BUN BLDV-MCNC: 9 MG/DL (ref 8–23)
CALCIUM SERPL-MCNC: 7.4 MG/DL (ref 8.6–10.2)
CHLORIDE BLD-SCNC: 110 MEQ/L (ref 98–107)
CO2: 19 MEQ/L (ref 22–29)
CREAT SERPL-MCNC: 0.63 MG/DL (ref 0.5–0.9)
GFR AFRICAN AMERICAN: >60
GFR NON-AFRICAN AMERICAN: >60
GLOBULIN: 2.2 G/DL (ref 2.3–3.5)
GLUCOSE BLD-MCNC: 132 MG/DL (ref 74–109)
HCT VFR BLD CALC: 24.8 % (ref 37–47)
HEMOGLOBIN: 8.2 G/DL (ref 12–16)
MCH RBC QN AUTO: 29 PG (ref 27–31.3)
MCHC RBC AUTO-ENTMCNC: 33 % (ref 33–37)
MCV RBC AUTO: 87.8 FL (ref 82–100)
PDW BLD-RTO: 13.7 % (ref 11.5–14.5)
PLATELET # BLD: 186 K/UL (ref 130–400)
POTASSIUM SERPL-SCNC: 4 MEQ/L (ref 3.5–5.1)
RBC # BLD: 2.83 M/UL (ref 4.2–5.4)
SODIUM BLD-SCNC: 138 MEQ/L (ref 132–144)
TOTAL PROTEIN: 4.7 G/DL (ref 6.4–8.1)
WBC # BLD: 11.8 K/UL (ref 4.8–10.8)

## 2018-09-27 PROCEDURE — 6360000002 HC RX W HCPCS: Performed by: SURGERY

## 2018-09-27 PROCEDURE — 1210000000 HC MED SURG R&B

## 2018-09-27 PROCEDURE — 99213 OFFICE O/P EST LOW 20 MIN: CPT

## 2018-09-27 PROCEDURE — 2580000003 HC RX 258: Performed by: STUDENT IN AN ORGANIZED HEALTH CARE EDUCATION/TRAINING PROGRAM

## 2018-09-27 PROCEDURE — 6370000000 HC RX 637 (ALT 250 FOR IP): Performed by: SURGERY

## 2018-09-27 PROCEDURE — 2580000003 HC RX 258: Performed by: SURGERY

## 2018-09-27 PROCEDURE — 36415 COLL VENOUS BLD VENIPUNCTURE: CPT

## 2018-09-27 PROCEDURE — 99231 SBSQ HOSP IP/OBS SF/LOW 25: CPT | Performed by: UROLOGY

## 2018-09-27 PROCEDURE — 6360000002 HC RX W HCPCS: Performed by: STUDENT IN AN ORGANIZED HEALTH CARE EDUCATION/TRAINING PROGRAM

## 2018-09-27 PROCEDURE — 85027 COMPLETE CBC AUTOMATED: CPT

## 2018-09-27 PROCEDURE — 6360000002 HC RX W HCPCS: Performed by: INTERNAL MEDICINE

## 2018-09-27 PROCEDURE — 99024 POSTOP FOLLOW-UP VISIT: CPT | Performed by: SURGERY

## 2018-09-27 PROCEDURE — 80053 COMPREHEN METABOLIC PANEL: CPT

## 2018-09-27 PROCEDURE — 2500000003 HC RX 250 WO HCPCS: Performed by: SURGERY

## 2018-09-27 RX ORDER — METOCLOPRAMIDE HYDROCHLORIDE 5 MG/ML
5 INJECTION INTRAMUSCULAR; INTRAVENOUS EVERY 8 HOURS
Status: DISCONTINUED | OUTPATIENT
Start: 2018-09-27 | End: 2018-10-03 | Stop reason: HOSPADM

## 2018-09-27 RX ADMIN — KETOROLAC TROMETHAMINE 15 MG: 15 INJECTION, SOLUTION INTRAMUSCULAR; INTRAVENOUS at 05:32

## 2018-09-27 RX ADMIN — POTASSIUM CHLORIDE, DEXTROSE MONOHYDRATE AND SODIUM CHLORIDE: 150; 5; 450 INJECTION, SOLUTION INTRAVENOUS at 05:30

## 2018-09-27 RX ADMIN — KETOROLAC TROMETHAMINE 15 MG: 15 INJECTION, SOLUTION INTRAMUSCULAR; INTRAVENOUS at 19:03

## 2018-09-27 RX ADMIN — Medication 10 ML: at 20:03

## 2018-09-27 RX ADMIN — ACETAMINOPHEN 650 MG: 325 TABLET ORAL at 19:02

## 2018-09-27 RX ADMIN — Medication 10 ML: at 19:03

## 2018-09-27 RX ADMIN — ROPIVACAINE HYDROCHLORIDE: 5 INJECTION, SOLUTION EPIDURAL; INFILTRATION; PERINEURAL at 02:51

## 2018-09-27 RX ADMIN — METOCLOPRAMIDE 5 MG: 5 INJECTION, SOLUTION INTRAMUSCULAR; INTRAVENOUS at 09:55

## 2018-09-27 RX ADMIN — ROPIVACAINE HYDROCHLORIDE: 5 INJECTION, SOLUTION EPIDURAL; INFILTRATION; PERINEURAL at 12:44

## 2018-09-27 RX ADMIN — POTASSIUM CHLORIDE, DEXTROSE MONOHYDRATE AND SODIUM CHLORIDE: 150; 5; 450 INJECTION, SOLUTION INTRAVENOUS at 21:50

## 2018-09-27 RX ADMIN — POTASSIUM CHLORIDE, DEXTROSE MONOHYDRATE AND SODIUM CHLORIDE: 150; 5; 450 INJECTION, SOLUTION INTRAVENOUS at 14:14

## 2018-09-27 RX ADMIN — ENOXAPARIN SODIUM 40 MG: 40 INJECTION SUBCUTANEOUS at 09:54

## 2018-09-27 RX ADMIN — ONDANSETRON 4 MG: 2 INJECTION INTRAMUSCULAR; INTRAVENOUS at 09:15

## 2018-09-27 RX ADMIN — LEVOTHYROXINE SODIUM 125 MCG: 125 TABLET ORAL at 09:56

## 2018-09-27 RX ADMIN — ROPIVACAINE HYDROCHLORIDE: 5 INJECTION, SOLUTION EPIDURAL; INFILTRATION; PERINEURAL at 21:50

## 2018-09-27 RX ADMIN — ACETAMINOPHEN 650 MG: 325 TABLET ORAL at 13:19

## 2018-09-27 RX ADMIN — ACETAMINOPHEN 650 MG: 325 TABLET ORAL at 05:30

## 2018-09-27 RX ADMIN — KETOROLAC TROMETHAMINE 15 MG: 15 INJECTION, SOLUTION INTRAMUSCULAR; INTRAVENOUS at 13:00

## 2018-09-27 RX ADMIN — METOCLOPRAMIDE 5 MG: 5 INJECTION, SOLUTION INTRAMUSCULAR; INTRAVENOUS at 19:08

## 2018-09-27 ASSESSMENT — PAIN SCALES - GENERAL
PAINLEVEL_OUTOF10: 3
PAINLEVEL_OUTOF10: 6
PAINLEVEL_OUTOF10: 10
PAINLEVEL_OUTOF10: 7
PAINLEVEL_OUTOF10: 7
PAINLEVEL_OUTOF10: 10

## 2018-09-27 ASSESSMENT — PAIN DESCRIPTION - PAIN TYPE: TYPE: SURGICAL PAIN

## 2018-09-27 ASSESSMENT — PAIN DESCRIPTION - LOCATION: LOCATION: ABDOMEN;BACK

## 2018-09-27 NOTE — PROGRESS NOTES
Department of Internal Medicine  Progress Note      SUBJECTIVE: No complains. Has some nausea. No acute events overnight.        ROS:  All 12 systems reviewed and negative except mentioned in HPI and Assessment and plan    MEDICATIONS:  Current Facility-Administered Medications   Medication Dose Route Frequency Provider Last Rate Last Dose    ketorolac (TORADOL) injection 15 mg  15 mg Intravenous Q6H PRN Estuardo Rivero MD   15 mg at 09/27/18 0532    enoxaparin (LOVENOX) injection 40 mg  40 mg Subcutaneous Daily Fabiana Mg MD   40 mg at 09/26/18 2039    naloxone (NARCAN) injection 0.4 mg  0.4 mg Intravenous PRN Corinne Konig, MD        ondansetron [facility]) injection 4 mg  4 mg Intravenous Q6H PRN Corinne Konig, MD   4 mg at 09/26/18 2333    fentaNYL (SUBLIMAZE) 2 mcg/mL, ropivacaine (NAROPIN) 0.1 % in sodium chloride 0.9 % 100 mL epidural   Epidural Continuous Surinder Nielsen DO 10 mL/hr at 09/27/18 0251      levothyroxine (SYNTHROID) tablet 125 mcg  125 mcg Oral Daily Fabiana Mg MD   125 mcg at 09/26/18 0837    ondansetron (ZOFRAN) tablet 4 mg  4 mg Oral Q8H PRN Fabiana Mg MD        dextrose 5 % and 0.45 % NaCl with KCl 20 mEq infusion   Intravenous Continuous Fabiana Mg  mL/hr at 09/27/18 0530      sodium chloride flush 0.9 % injection 10 mL  10 mL Intravenous 2 times per day Fabiana Mg MD   10 mL at 09/26/18 2039    sodium chloride flush 0.9 % injection 10 mL  10 mL Intravenous PRN Fabiana Mg MD        acetaminophen (TYLENOL) tablet 650 mg  650 mg Oral Q4H PRN Fabiana Mg MD   650 mg at 09/27/18 0530    ondansetron (ZOFRAN) injection 4 mg  4 mg Intravenous Q6H PRN Fabiana Mg MD             OBJECTIVE:  Vital Signs:  Vitals:    09/26/18 1020   BP: (!) 109/45   Pulse:    Resp:    Temp:    SpO2:        Focal exam:      General Exam (except as mentioned above):  CONSTITUTIONAL: Awake, alert, no apparent distress  EYES:  PERRL, conjunctiva normal  ENT:

## 2018-09-27 NOTE — CARE COORDINATION
9/27/18 I MET WITH THE PATIENT. SHE IS C/O SEVERE NAUSEA AND JUST MEDICATED FOR IT. SHE DID STATE WHEN DISCHARGE SHE WOULD LIKE TO GO HOME WITH [facility]. FREEDOM OF CHOICE GIVEN TO THE PATIENT. OSTOMY NURSE DID SEE THE PT TODAY. THE PATIENT HAS AN EPIDURAL THAT IS ONLY WORKING FROM THE BELLY BUTTON UP. SHE IS ALSO GETTING TORADOL Q6H WHICH IS HELPING THE PAIN. PT STATED MY ONLY PROBLEM IS NAUSEA.

## 2018-09-27 NOTE — PROGRESS NOTES
DR Elian Valdivia WAS NOTIFIED OF THE DARKENED COLOR OF THE STOMA AS POINTED OUT BY THE WOUND OSTOMY NURSE.  hE SAID THAT HE WILL COME AND SEE HER TODAY.

## 2018-09-27 NOTE — PROGRESS NOTES
dusky today but viable and working well, she was leaking around the appliance and onto the incision  INCISION: healing well, no significant drainage, no significant erythema, skin staples in place with no erythema   EXTERMITY: no cyanosis, clubbing or edema    In: 3407.9 [I.V.:3307.9; Other:100]  Out: 1840 [Urine:1400; Drains:150]    Date 09/27/18 0000 - 09/27/18 2359   Shift 9797-0026 4704-4780 6129-2366 24 Hour Total   I  N  T  A  K  E   I.V.  (mL/kg) 1057  (11.8)   1057  (11.8)    Shift Total  (mL/kg) 1057  (11.8)   1057  (11.8)   O  U  T  P  U  T   Urine  (mL/kg/hr) 800  (1.1)   800    Drains  (mL/kg) 80  (0.9)   80  (0.9)    Stool  (mL/kg) 100  (1.1) 100  (1.1)  200  (2.2)    Shift Total  (mL/kg) 980  (11) 100  (1.1)  1080  (12.1)   Weight (kg) 89.3 89.3 89.3 89.3       LABS  Recent Labs      09/25/18   1345  09/26/18   0636  09/27/18   0554   WBC  11.5*  13.7*  11.8*   HGB  10.1*  8.2*  8.2*   HCT  30.8*  24.6*  24.8*   PLT  217  185  186   NA  140  138  138   K  3.8  4.1  4.0   CL  112*  109*  110*   CO2  18*  20*  19*   BUN  11  17  9   CREATININE  0.57  0.83  0.63   CALCIUM  7.0*  7.1*  7.4*      No results for input(s): PTT, INR in the last 72 hours. Invalid input(s): PT  Recent Labs      09/26/18   0636  09/27/18   0554   AST  19  17   ALT  18  15   BILITOT  0.3  <0.2       RADIOLOGY  Ct Abdomen Pelvis Wo Contrast Additional Contrast? Oral And Rectal    Result Date: 8/31/2018  EXAMINATION: CT Abdomen and Pelvis without IV contrast. CLINICAL HISTORY: N39.0 Recurrent urinary tract infection ICD10,  check for colovesical fistula. Intermittent pain for 2 years. No change in bowel movements. Air in urine since cystoscopy. No stool within urine. History of hysterectomy and BSO. COMPARISONS: 7/2/2018 TECHNIQUE: Contiguous axial images were obtained through the abdomen and pelvis without contrast enhancement. Coronal and sagittal reformations were made. FINDINGS: Lung bases are clear.   The liver, spleen,

## 2018-09-27 NOTE — PROGRESS NOTES
Up in chair, states she requires Toradol every 6 hours in addition to epidural infusion, but pain is controlled with the combination of both drugs. Dressing intact. No change made to infusion rate.

## 2018-09-28 ENCOUNTER — TELEPHONE (OUTPATIENT)
Dept: FAMILY MEDICINE CLINIC | Age: 68
End: 2018-09-28

## 2018-09-28 PROCEDURE — 2580000003 HC RX 258: Performed by: STUDENT IN AN ORGANIZED HEALTH CARE EDUCATION/TRAINING PROGRAM

## 2018-09-28 PROCEDURE — 6360000002 HC RX W HCPCS: Performed by: SURGERY

## 2018-09-28 PROCEDURE — 99024 POSTOP FOLLOW-UP VISIT: CPT | Performed by: SURGERY

## 2018-09-28 PROCEDURE — 2500000003 HC RX 250 WO HCPCS: Performed by: SURGERY

## 2018-09-28 PROCEDURE — 2580000003 HC RX 258: Performed by: SURGERY

## 2018-09-28 PROCEDURE — 6360000002 HC RX W HCPCS: Performed by: ANESTHESIOLOGY

## 2018-09-28 PROCEDURE — 6360000002 HC RX W HCPCS: Performed by: INTERNAL MEDICINE

## 2018-09-28 PROCEDURE — 99215 OFFICE O/P EST HI 40 MIN: CPT

## 2018-09-28 PROCEDURE — 6360000002 HC RX W HCPCS: Performed by: STUDENT IN AN ORGANIZED HEALTH CARE EDUCATION/TRAINING PROGRAM

## 2018-09-28 PROCEDURE — 1210000000 HC MED SURG R&B

## 2018-09-28 RX ADMIN — POTASSIUM CHLORIDE, DEXTROSE MONOHYDRATE AND SODIUM CHLORIDE: 150; 5; 450 INJECTION, SOLUTION INTRAVENOUS at 01:30

## 2018-09-28 RX ADMIN — Medication 10 ML: at 01:24

## 2018-09-28 RX ADMIN — KETOROLAC TROMETHAMINE 15 MG: 15 INJECTION, SOLUTION INTRAMUSCULAR; INTRAVENOUS at 12:45

## 2018-09-28 RX ADMIN — METOCLOPRAMIDE 5 MG: 5 INJECTION, SOLUTION INTRAMUSCULAR; INTRAVENOUS at 17:45

## 2018-09-28 RX ADMIN — HYDROMORPHONE HYDROCHLORIDE 1 MG: 1 INJECTION, SOLUTION INTRAMUSCULAR; INTRAVENOUS; SUBCUTANEOUS at 20:21

## 2018-09-28 RX ADMIN — KETOROLAC TROMETHAMINE 15 MG: 15 INJECTION, SOLUTION INTRAMUSCULAR; INTRAVENOUS at 22:47

## 2018-09-28 RX ADMIN — ROPIVACAINE HYDROCHLORIDE: 5 INJECTION, SOLUTION EPIDURAL; INFILTRATION; PERINEURAL at 07:28

## 2018-09-28 RX ADMIN — METOCLOPRAMIDE 5 MG: 5 INJECTION, SOLUTION INTRAMUSCULAR; INTRAVENOUS at 01:22

## 2018-09-28 RX ADMIN — METOCLOPRAMIDE 5 MG: 5 INJECTION, SOLUTION INTRAMUSCULAR; INTRAVENOUS at 09:45

## 2018-09-28 RX ADMIN — HYDROMORPHONE HYDROCHLORIDE 1 MG: 1 INJECTION, SOLUTION INTRAMUSCULAR; INTRAVENOUS; SUBCUTANEOUS at 16:35

## 2018-09-28 RX ADMIN — HYDROMORPHONE HYDROCHLORIDE 1 MG: 1 INJECTION, SOLUTION INTRAMUSCULAR; INTRAVENOUS; SUBCUTANEOUS at 12:45

## 2018-09-28 RX ADMIN — ENOXAPARIN SODIUM 40 MG: 40 INJECTION SUBCUTANEOUS at 14:04

## 2018-09-28 RX ADMIN — KETOROLAC TROMETHAMINE 15 MG: 15 INJECTION, SOLUTION INTRAMUSCULAR; INTRAVENOUS at 01:22

## 2018-09-28 RX ADMIN — Medication 10 ML: at 20:21

## 2018-09-28 RX ADMIN — ONDANSETRON HYDROCHLORIDE 4 MG: 2 INJECTION, SOLUTION INTRAMUSCULAR; INTRAVENOUS at 12:45

## 2018-09-28 ASSESSMENT — PAIN DESCRIPTION - DESCRIPTORS: DESCRIPTORS: SHARP

## 2018-09-28 ASSESSMENT — PAIN DESCRIPTION - LOCATION: LOCATION: ABDOMEN

## 2018-09-28 ASSESSMENT — PAIN DESCRIPTION - ORIENTATION: ORIENTATION: ANTERIOR

## 2018-09-28 ASSESSMENT — PAIN SCALES - GENERAL
PAINLEVEL_OUTOF10: 5
PAINLEVEL_OUTOF10: 6
PAINLEVEL_OUTOF10: 8
PAINLEVEL_OUTOF10: 8
PAINLEVEL_OUTOF10: 2
PAINLEVEL_OUTOF10: 6
PAINLEVEL_OUTOF10: 8

## 2018-09-28 ASSESSMENT — PAIN DESCRIPTION - PAIN TYPE: TYPE: SURGICAL PAIN

## 2018-09-28 ASSESSMENT — PAIN DESCRIPTION - FREQUENCY: FREQUENCY: CONTINUOUS

## 2018-09-28 NOTE — PROGRESS NOTES
mentioned above):  CONSTITUTIONAL: Awake, alert, no apparent distress  EYES:  PERRL, conjunctiva normal  ENT:  Normocephalic, atraumatic  NECK:  Supple  BACK:  Symmetric  LUNGS:  CTAB except bilateral basilar crackles. CARDIOVASCULAR:  S1S2 present  ABDOMEN:  soft, non-distended, non-tender  MUSCULOSKELETAL:  There is no redness, warmth, or swelling of the joints. NEUROLOGIC:  Alert, awake, oriented x 3. No FND  EXTREMITIES: Warm and well perfused. LABS  Recent Labs      09/25/18   1345  09/26/18   0636  09/27/18   0554   WBC  11.5*  13.7*  11.8*   RBC  3.48*  2.81*  2.83*   HGB  10.1*  8.2*  8.2*   HCT  30.8*  24.6*  24.8*   MCV  88.6  87.6  87.8   MCH  29.0  29.2  29.0   MCHC  32.7*  33.3  33.0   RDW  13.6  13.5  13.7   PLT  217  185  186       Recent Labs      09/25/18   1345  09/26/18   0636  09/27/18   0554   NA  140  138  138   K  3.8  4.1  4.0   CL  112*  109*  110*   CO2  18*  20*  19*   BUN  11  17  9   CREATININE  0.57  0.83  0.63   GLUCOSE  158*  161*  132*   CALCIUM  7.0*  7.1*  7.4*       No results for input(s): MG in the last 72 hours. ASSESSMENT AND PLAN    Active Hospital Problems    Diagnosis Date Noted    Colovesical fistula [N32.1] 09/25/2018    Colovaginal fistula [N82.4] 09/19/2018    Sigmoid diverticulitis [K57.32] 09/06/2018    [place]-vesical fistula [N32.1] 08/27/2018    Hypothyroidism [E03.9]        Hypotension:resolved. No signs of infection. Patient is afebrile. Leukocytosis trending down.   - Hypothyroidism:Continue Synthroid  started on clear liquid diet    DVT prophylaxis and Pain management as per primary    Sherrie Gaffney MD  Pager : [phone]

## 2018-09-28 NOTE — PROGRESS NOTES
Pt bathed and up walking hendricks. Walked entire hendricks. Did great. No nausea. Has been able to drink some clear liquids without a problem.

## 2018-09-28 NOTE — PROGRESS NOTES
dusky today but viable and working well, she was leaking around the appliance and onto the incision  INCISION: healing well, no significant drainage, no significant erythema, skin staples in place with no erythema   EXTERMITY: no cyanosis, clubbing or edema  In: 1520 [I.V.:1520]  Out: 1700 [Urine:1400; Drains:100]    Date 09/28/18 0000 - 09/28/18 2359   Shift 4502-9860 3151-5074 8033-2205 24 Hour Total   I  N  T  A  K  E   I.V.  (mL/kg) 1520  (17)   1520  (17)    Shift Total  (mL/kg) 1520  (17)   1520  (17)   O  U  T  P  U  T   Urine  (mL/kg/hr) 1400  (2)   1400    Emesis/NG output  (mL/kg) 50  (0.6)   50  (0.6)    Drains  (mL/kg) 100  (1.1)   100  (1.1)    Stool  (mL/kg) 150  (1.7)   150  (1.7)    Shift Total  (mL/kg) 1700  (19)   1700  (19)   Weight (kg) 89.3 89.3 89.3 89.3       LABS  Recent Labs      09/25/18   1345  09/26/18   0636  09/27/18   0554   WBC  11.5*  13.7*  11.8*   HGB  10.1*  8.2*  8.2*   HCT  30.8*  24.6*  24.8*   PLT  217  185  186   NA  140  138  138   K  3.8  4.1  4.0   CL  112*  109*  110*   CO2  18*  20*  19*   BUN  11  17  9   CREATININE  0.57  0.83  0.63   CALCIUM  7.0*  7.1*  7.4*      No results for input(s): PTT, INR in the last 72 hours. Invalid input(s): PT  Recent Labs      09/26/18   0636  09/27/18   0554   AST  19  17   ALT  18  15   BILITOT  0.3  <0.2       RADIOLOGY  Ct Abdomen Pelvis Wo Contrast Additional Contrast? Oral And Rectal    Result Date: 8/31/2018  EXAMINATION: CT Abdomen and Pelvis without IV contrast. CLINICAL HISTORY: N39.0 Recurrent urinary tract infection ICD10,  check for colovesical fistula. Intermittent pain for 2 years. No change in bowel movements. Air in urine since cystoscopy. No stool within urine. History of hysterectomy and BSO. COMPARISONS: 7/2/2018 TECHNIQUE: Contiguous axial images were obtained through the abdomen and pelvis without contrast enhancement. Coronal and sagittal reformations were made. FINDINGS: Lung bases are clear.   The liver,

## 2018-09-29 PROCEDURE — 99231 SBSQ HOSP IP/OBS SF/LOW 25: CPT | Performed by: UROLOGY

## 2018-09-29 PROCEDURE — 6360000002 HC RX W HCPCS: Performed by: INTERNAL MEDICINE

## 2018-09-29 PROCEDURE — 6360000002 HC RX W HCPCS: Performed by: SURGERY

## 2018-09-29 PROCEDURE — 2500000003 HC RX 250 WO HCPCS: Performed by: SURGERY

## 2018-09-29 PROCEDURE — 6370000000 HC RX 637 (ALT 250 FOR IP): Performed by: SURGERY

## 2018-09-29 PROCEDURE — 2580000003 HC RX 258: Performed by: SURGERY

## 2018-09-29 PROCEDURE — 1210000000 HC MED SURG R&B

## 2018-09-29 RX ADMIN — METOCLOPRAMIDE 5 MG: 5 INJECTION, SOLUTION INTRAMUSCULAR; INTRAVENOUS at 18:00

## 2018-09-29 RX ADMIN — POTASSIUM CHLORIDE, DEXTROSE MONOHYDRATE AND SODIUM CHLORIDE: 150; 5; 450 INJECTION, SOLUTION INTRAVENOUS at 01:50

## 2018-09-29 RX ADMIN — HYDROMORPHONE HYDROCHLORIDE 1 MG: 1 INJECTION, SOLUTION INTRAMUSCULAR; INTRAVENOUS; SUBCUTANEOUS at 16:59

## 2018-09-29 RX ADMIN — KETOROLAC TROMETHAMINE 15 MG: 15 INJECTION, SOLUTION INTRAMUSCULAR; INTRAVENOUS at 12:41

## 2018-09-29 RX ADMIN — METOCLOPRAMIDE 5 MG: 5 INJECTION, SOLUTION INTRAMUSCULAR; INTRAVENOUS at 09:01

## 2018-09-29 RX ADMIN — POTASSIUM CHLORIDE, DEXTROSE MONOHYDRATE AND SODIUM CHLORIDE: 150; 5; 450 INJECTION, SOLUTION INTRAVENOUS at 10:26

## 2018-09-29 RX ADMIN — ENOXAPARIN SODIUM 40 MG: 40 INJECTION SUBCUTANEOUS at 09:01

## 2018-09-29 RX ADMIN — LEVOTHYROXINE SODIUM 125 MCG: 125 TABLET ORAL at 09:01

## 2018-09-29 RX ADMIN — KETOROLAC TROMETHAMINE 15 MG: 15 INJECTION, SOLUTION INTRAMUSCULAR; INTRAVENOUS at 05:45

## 2018-09-29 RX ADMIN — HYDROMORPHONE HYDROCHLORIDE 1 MG: 1 INJECTION, SOLUTION INTRAMUSCULAR; INTRAVENOUS; SUBCUTANEOUS at 20:54

## 2018-09-29 RX ADMIN — HYDROMORPHONE HYDROCHLORIDE 1 MG: 1 INJECTION, SOLUTION INTRAMUSCULAR; INTRAVENOUS; SUBCUTANEOUS at 13:53

## 2018-09-29 RX ADMIN — METOCLOPRAMIDE 5 MG: 5 INJECTION, SOLUTION INTRAMUSCULAR; INTRAVENOUS at 01:03

## 2018-09-29 RX ADMIN — HYDROMORPHONE HYDROCHLORIDE 1 MG: 1 INJECTION, SOLUTION INTRAMUSCULAR; INTRAVENOUS; SUBCUTANEOUS at 01:03

## 2018-09-29 RX ADMIN — Medication 10 ML: at 19:42

## 2018-09-29 RX ADMIN — HYDROMORPHONE HYDROCHLORIDE 1 MG: 1 INJECTION, SOLUTION INTRAMUSCULAR; INTRAVENOUS; SUBCUTANEOUS at 09:02

## 2018-09-29 RX ADMIN — HYDROMORPHONE HYDROCHLORIDE 1 MG: 1 INJECTION, SOLUTION INTRAMUSCULAR; INTRAVENOUS; SUBCUTANEOUS at 05:45

## 2018-09-29 ASSESSMENT — PAIN SCALES - GENERAL
PAINLEVEL_OUTOF10: 4
PAINLEVEL_OUTOF10: 5
PAINLEVEL_OUTOF10: 7
PAINLEVEL_OUTOF10: 6
PAINLEVEL_OUTOF10: 3
PAINLEVEL_OUTOF10: 5
PAINLEVEL_OUTOF10: 9
PAINLEVEL_OUTOF10: 5

## 2018-09-29 NOTE — PROGRESS NOTES
Pt up ambulating in hallway this evening. meds given per orders. Medicated with dilaudid for pain control. No nausea or emesis. Call light in reach.

## 2018-09-29 NOTE — PROGRESS NOTES
non-distended, abdominal binder in place, colostomy patent, bowel sounds present in all 4 quadrants and no guarding or peritoneal signs  EXTERMITY: no cyanosis, clubbing or edema  In: 3426 [P.O.:740; I.V.:2686]  Out: 2462 [Urine:2475; Drains:50]    Date 09/29/18 0000 - 09/29/18 2359   Shift 0308-8619 5293-3842 0473-0420 24 Hour Total   I  N  T  A  K  E   P.O.  (mL/kg/hr) 300  (0.4)   300    I.V.  (mL/kg) 1436  (16.1)   1436  (16.1)    Shift Total  (mL/kg) 1736  (19.4)   1736  (19.4)   O  U  T  P  U  T   Urine  (mL/kg/hr) 1800  (2.5)   1800    Drains  (mL/kg) 20  (0.2)   20  (0.2)    Stool  (mL/kg) 20  (0.2)   20  (0.2)    Shift Total  (mL/kg) 1840  (20.6)   1840  (20.6)   Weight (kg) 89.3 89.3 89.3 89.3       LABS  Recent Labs      09/27/18   0554   WBC  11.8*   HGB  8.2*   HCT  24.8*   PLT  186   NA  138   K  4.0   CL  110*   CO2  19*   BUN  9   CREATININE  0.63   CALCIUM  7.4*      No results for input(s): PTT, INR in the last 72 hours. Invalid input(s): PT  Recent Labs      09/27/18   0554   AST  17   ALT  15   BILITOT  <0.2       RADIOLOGY  Ct Abdomen Pelvis Wo Contrast Additional Contrast? Oral And Rectal    Result Date: 8/31/2018  EXAMINATION: CT Abdomen and Pelvis without IV contrast. CLINICAL HISTORY: N39.0 Recurrent urinary tract infection ICD10,  check for colovesical fistula. Intermittent pain for 2 years. No change in bowel movements. Air in urine since cystoscopy. No stool within urine. History of hysterectomy and BSO. COMPARISONS: 7/2/2018 TECHNIQUE: Contiguous axial images were obtained through the abdomen and pelvis without contrast enhancement. Coronal and sagittal reformations were made. FINDINGS: Lung bases are clear. The liver, spleen, pancreas, and adrenal glands have normal morphology. Gallbladder has been resected. No focal renal lesion or hydronephrosis bilaterally. No nephrolithiasis. Bladder has normal configuration. No air seen within the bladder. There is air within the vagina.  This

## 2018-09-30 PROCEDURE — 99024 POSTOP FOLLOW-UP VISIT: CPT | Performed by: COLON & RECTAL SURGERY

## 2018-09-30 PROCEDURE — 2580000003 HC RX 258: Performed by: SURGERY

## 2018-09-30 PROCEDURE — 6360000002 HC RX W HCPCS: Performed by: SURGERY

## 2018-09-30 PROCEDURE — 6370000000 HC RX 637 (ALT 250 FOR IP): Performed by: SURGERY

## 2018-09-30 PROCEDURE — 6360000002 HC RX W HCPCS: Performed by: INTERNAL MEDICINE

## 2018-09-30 PROCEDURE — 1210000000 HC MED SURG R&B

## 2018-09-30 RX ADMIN — HYDROMORPHONE HYDROCHLORIDE 1 MG: 1 INJECTION, SOLUTION INTRAMUSCULAR; INTRAVENOUS; SUBCUTANEOUS at 12:59

## 2018-09-30 RX ADMIN — METOCLOPRAMIDE 5 MG: 5 INJECTION, SOLUTION INTRAMUSCULAR; INTRAVENOUS at 08:09

## 2018-09-30 RX ADMIN — HYDROMORPHONE HYDROCHLORIDE 1 MG: 1 INJECTION, SOLUTION INTRAMUSCULAR; INTRAVENOUS; SUBCUTANEOUS at 19:51

## 2018-09-30 RX ADMIN — HYDROMORPHONE HYDROCHLORIDE 1 MG: 1 INJECTION, SOLUTION INTRAMUSCULAR; INTRAVENOUS; SUBCUTANEOUS at 07:01

## 2018-09-30 RX ADMIN — HYDROMORPHONE HYDROCHLORIDE 1 MG: 1 INJECTION, SOLUTION INTRAMUSCULAR; INTRAVENOUS; SUBCUTANEOUS at 23:28

## 2018-09-30 RX ADMIN — LEVOTHYROXINE SODIUM 125 MCG: 125 TABLET ORAL at 08:08

## 2018-09-30 RX ADMIN — Medication 10 ML: at 19:52

## 2018-09-30 RX ADMIN — ENOXAPARIN SODIUM 40 MG: 40 INJECTION SUBCUTANEOUS at 08:08

## 2018-09-30 RX ADMIN — METOCLOPRAMIDE 5 MG: 5 INJECTION, SOLUTION INTRAMUSCULAR; INTRAVENOUS at 00:58

## 2018-09-30 RX ADMIN — HYDROMORPHONE HYDROCHLORIDE 1 MG: 1 INJECTION, SOLUTION INTRAMUSCULAR; INTRAVENOUS; SUBCUTANEOUS at 00:58

## 2018-09-30 RX ADMIN — Medication 10 ML: at 08:09

## 2018-09-30 RX ADMIN — METOCLOPRAMIDE 5 MG: 5 INJECTION, SOLUTION INTRAMUSCULAR; INTRAVENOUS at 16:07

## 2018-09-30 RX ADMIN — KETOROLAC TROMETHAMINE 15 MG: 15 INJECTION, SOLUTION INTRAMUSCULAR; INTRAVENOUS at 09:37

## 2018-09-30 RX ADMIN — KETOROLAC TROMETHAMINE 15 MG: 15 INJECTION, SOLUTION INTRAMUSCULAR; INTRAVENOUS at 17:10

## 2018-09-30 ASSESSMENT — PAIN SCALES - GENERAL
PAINLEVEL_OUTOF10: 5
PAINLEVEL_OUTOF10: 5
PAINLEVEL_OUTOF10: 6
PAINLEVEL_OUTOF10: 6
PAINLEVEL_OUTOF10: 7
PAINLEVEL_OUTOF10: 7
PAINLEVEL_OUTOF10: 6
PAINLEVEL_OUTOF10: 8
PAINLEVEL_OUTOF10: 8

## 2018-09-30 NOTE — PROGRESS NOTES
distress  EYES:  PERRL, conjunctiva normal  ENT:  Normocephalic, atraumatic  NECK:  Supple  BACK:  Symmetric  LUNGS:  CTAB except bilateral basilar crackles. CARDIOVASCULAR:  S1S2 present  ABDOMEN:  soft, non-distended, non-tender  MUSCULOSKELETAL:  There is no redness, warmth, or swelling of the joints. NEUROLOGIC:  Alert, awake, oriented x 3. No FND  EXTREMITIES: Warm and well perfused. LABS  No results for input(s): WBC, RBC, HGB, HCT, MCV, MCH, MCHC, RDW, PLT, MPV in the last 72 hours. No results for input(s): NA, K, CL, CO2, BUN, CREATININE, GLUCOSE, CALCIUM in the last 72 hours. No results for input(s): MG in the last 72 hours. ASSESSMENT AND PLAN    Active Hospital Problems    Diagnosis Date Noted    Colovesical fistula [N32.1] 09/25/2018    Colovaginal fistula [N82.4] 09/19/2018    Sigmoid diverticulitis [K57.32] 09/06/2018    [place]-vesical fistula [N32.1] 08/27/2018    Hypothyroidism [E03.9]        Hypotension:resolved. No signs of infection. Patient is afebrile. Leukocytosis trending down.   - Hypothyroidism:Continue Synthroid  started on general diett     DVT prophylaxis and Pain management as per primary  Barbara Johnson MD  Pager : [phone]

## 2018-09-30 NOTE — PROGRESS NOTES
Pt Name: Cesar Man  Medical Record Number: [id number]  Date of Birth 1950   Admit date 2018  5:46 AM  Today's Date: 2018     ASSESSMENT  1. Hospital day # 5  2  postop day #4 colon Resection/colostomy    PLAN  1. Current management  2. May shower  3. Martinez catheter be left in per Dr. Diego Morin  4. Keep GHAZALA drain in      SUBJECTIVE  Chief complaint: Frequent nurse checks  Afebrile, vital signs are stable. She denies any nausea or vomiting, colostomy working well She is tolerating a DIET LOW FIBER;. Her pain is well controlled on current medications. She has been ambulating in the halls. has a past medical history of Cancer ([address] Chest pain in adult; Heart palpitations; Hypothyroidism; Other chronic cystitis with hematuria; and SOB (shortness of breath). CURRENT MEDS  Scheduled Meds:   metoclopramide  5 mg Intravenous Q8H    enoxaparin  40 mg Subcutaneous Daily    levothyroxine  125 mcg Oral Daily    sodium chloride flush  10 mL Intravenous 2 times per day     Continuous Infusions:  PRN Meds:. HYDROmorphone, HYDROmorphone, ketorolac, naloxone, ondansetron, ondansetron, sodium chloride flush, acetaminophen    OBJECTIVE  CURRENT VITALS:  weight is 196 lb 13.9 oz (89.3 kg). Her oral temperature is 97.9 °F (36.6 °C). Her blood pressure is 138/56 (abnormal) and her pulse is 91. Her respiration is 19 and oxygen saturation is 97%.    Temperature Range (24h):Temp: 97.9 °F (36.6 °C) Temp  Av.8 °F (37.1 °C)  Min: 97.9 °F (36.6 °C)  Max: 99.7 °F (37.6 °C)  BP Range (29L): Systolic (14OYH), ESF:138 , Min:135 , XJP:071     Diastolic (99SZB), RTQ:52, Min:45, Max:71    Pulse Range (24h): Pulse  Av  Min: 91  Max: 97  Respiration Range (24h): Resp  Av.5  Min: 16  Max: 19    GENERAL: alert, no distress  LUNGS: clear to ausculation, without wheezes, rales or rhonci  HEART: normal rate and regular rhythm  ABDOMEN: soft, non-tender, non-distended, colostomy functioning, GHAZALA draining serosanguineous, bowel sounds present in all 4 quadrants and no guarding or peritoneal signs  EXTERMITY: no cyanosis, clubbing or edema  In: 1140 [P.O.:340; I.V.:800]  Out: 2602.5 [Urine:2300; Drains:102.5]    Date 09/30/18 0000 - 09/30/18 2359   Shift 4320-7086 1739-2713 6512-7108 24 Hour Total   I  N  T  A  K  E   P.O.  (mL/kg/hr) 220  (0.3) 120  340    I.V.  (mL/kg) 0  (0)   0  (0)    Shift Total  (mL/kg) 220  (2.5) 120  (1.3)  340  (3.8)   O  U  T  P  U  T   Urine  (mL/kg/hr) 900  (1.3)   900    Drains  (mL/kg) 20  (0.2)   20  (0.2)    Stool  (mL/kg)  100  (1.1)  100  (1.1)    Shift Total  (mL/kg) 920  (10.3) 100  (1.1)  1020  (11.4)   Weight (kg) 89.3 89.3 89.3 89.3       LABS  No results for input(s): WBC, HGB, HCT, PLT, NA, K, CL, CO2, BUN, CREATININE, MG, PHOS, CALCIUM in the last 72 hours. No results for input(s): PTT, INR in the last 72 hours. Invalid input(s): PT  No results for input(s): AST, ALT, BILITOT, BILIDIR, AMYLASE, LIPASE, LDH, LACTA in the last 72 hours. RADIOLOGY  Ct Abdomen Pelvis Wo Contrast Additional Contrast? Oral And Rectal    Result Date: 8/31/2018  EXAMINATION: CT Abdomen and Pelvis without IV contrast. CLINICAL HISTORY: N39.0 Recurrent urinary tract infection ICD10,  check for colovesical fistula. Intermittent pain for 2 years. No change in bowel movements. Air in urine since cystoscopy. No stool within urine. History of hysterectomy and BSO. COMPARISONS: 7/2/2018 TECHNIQUE: Contiguous axial images were obtained through the abdomen and pelvis without contrast enhancement. Coronal and sagittal reformations were made. FINDINGS: Lung bases are clear. The liver, spleen, pancreas, and adrenal glands have normal morphology. Gallbladder has been resected. No focal renal lesion or hydronephrosis bilaterally. No nephrolithiasis. Bladder has normal configuration. No air seen within the bladder. There is air within the vagina. This was seen previously.  No retroperitoneal fluid

## 2018-10-01 LAB
ANION GAP SERPL CALCULATED.3IONS-SCNC: 13 MEQ/L (ref 7–13)
ANISOCYTOSIS: ABNORMAL
BANDED NEUTROPHILS RELATIVE PERCENT: 1 % (ref 5–11)
BASOPHILS ABSOLUTE: 0 K/UL (ref 0–0.2)
BASOPHILS RELATIVE PERCENT: 0.6 %
BUN BLDV-MCNC: 7 MG/DL (ref 8–23)
CALCIUM SERPL-MCNC: 7.9 MG/DL (ref 8.6–10.2)
CHLORIDE BLD-SCNC: 99 MEQ/L (ref 98–107)
CO2: 25 MEQ/L (ref 22–29)
CREAT SERPL-MCNC: 0.49 MG/DL (ref 0.5–0.9)
EOSINOPHILS ABSOLUTE: 0.4 K/UL (ref 0–0.7)
EOSINOPHILS RELATIVE PERCENT: 5 %
GFR AFRICAN AMERICAN: >60
GFR NON-AFRICAN AMERICAN: >60
GLUCOSE BLD-MCNC: 111 MG/DL (ref 74–109)
HCT VFR BLD CALC: 24 % (ref 37–47)
HEMOGLOBIN: 8 G/DL (ref 12–16)
HYPOCHROMIA: 0
LYMPHOCYTES ABSOLUTE: 0.5 K/UL (ref 1–4.8)
LYMPHOCYTES RELATIVE PERCENT: 7 %
MACROCYTES: 0
MCH RBC QN AUTO: 28.8 PG (ref 27–31.3)
MCHC RBC AUTO-ENTMCNC: 33.5 % (ref 33–37)
MCV RBC AUTO: 86.1 FL (ref 82–100)
METAMYELOCYTES RELATIVE PERCENT: 1 %
MICROCYTES: ABNORMAL
MONOCYTES ABSOLUTE: 0.2 K/UL (ref 0.2–0.8)
MONOCYTES RELATIVE PERCENT: 2.9 %
NEUTROPHILS ABSOLUTE: 6.1 K/UL (ref 1.4–6.5)
NEUTROPHILS RELATIVE PERCENT: 82 %
NUCLEATED RED BLOOD CELLS: 2 /100 WBC
PDW BLD-RTO: 13.5 % (ref 11.5–14.5)
PLATELET # BLD: 340 K/UL (ref 130–400)
PLATELET SLIDE REVIEW: ADEQUATE
POIKILOCYTES: 0
POLYCHROMASIA: 0
POTASSIUM SERPL-SCNC: 3.7 MEQ/L (ref 3.5–5.1)
RBC # BLD: 2.79 M/UL (ref 4.2–5.4)
SODIUM BLD-SCNC: 137 MEQ/L (ref 132–144)
WBC # BLD: 7.3 K/UL (ref 4.8–10.8)

## 2018-10-01 PROCEDURE — 1210000000 HC MED SURG R&B

## 2018-10-01 PROCEDURE — 99024 POSTOP FOLLOW-UP VISIT: CPT | Performed by: SURGERY

## 2018-10-01 PROCEDURE — 36415 COLL VENOUS BLD VENIPUNCTURE: CPT

## 2018-10-01 PROCEDURE — 80048 BASIC METABOLIC PNL TOTAL CA: CPT

## 2018-10-01 PROCEDURE — 6370000000 HC RX 637 (ALT 250 FOR IP): Performed by: SURGERY

## 2018-10-01 PROCEDURE — 99212 OFFICE O/P EST SF 10 MIN: CPT

## 2018-10-01 PROCEDURE — 85025 COMPLETE CBC W/AUTO DIFF WBC: CPT

## 2018-10-01 PROCEDURE — 6360000002 HC RX W HCPCS: Performed by: SURGERY

## 2018-10-01 PROCEDURE — 2500000003 HC RX 250 WO HCPCS: Performed by: SURGERY

## 2018-10-01 PROCEDURE — 6360000002 HC RX W HCPCS: Performed by: ANESTHESIOLOGY

## 2018-10-01 PROCEDURE — 2580000003 HC RX 258: Performed by: SURGERY

## 2018-10-01 RX ORDER — SODIUM CHLORIDE, SODIUM LACTATE, POTASSIUM CHLORIDE, CALCIUM CHLORIDE 600; 310; 30; 20 MG/100ML; MG/100ML; MG/100ML; MG/100ML
INJECTION, SOLUTION INTRAVENOUS CONTINUOUS
Status: DISCONTINUED | OUTPATIENT
Start: 2018-10-01 | End: 2018-10-02

## 2018-10-01 RX ORDER — HYDROCODONE BITARTRATE AND ACETAMINOPHEN 5; 325 MG/1; MG/1
1 TABLET ORAL EVERY 4 HOURS PRN
Status: DISCONTINUED | OUTPATIENT
Start: 2018-10-01 | End: 2018-10-03 | Stop reason: HOSPADM

## 2018-10-01 RX ORDER — HYDROCODONE BITARTRATE AND ACETAMINOPHEN 5; 325 MG/1; MG/1
2 TABLET ORAL EVERY 4 HOURS PRN
Status: DISCONTINUED | OUTPATIENT
Start: 2018-10-01 | End: 2018-10-03 | Stop reason: HOSPADM

## 2018-10-01 RX ORDER — CIPROFLOXACIN 2 MG/ML
400 INJECTION, SOLUTION INTRAVENOUS EVERY 12 HOURS
Status: DISCONTINUED | OUTPATIENT
Start: 2018-10-01 | End: 2018-10-03 | Stop reason: HOSPADM

## 2018-10-01 RX ORDER — KETOROLAC TROMETHAMINE 15 MG/ML
15 INJECTION, SOLUTION INTRAMUSCULAR; INTRAVENOUS EVERY 6 HOURS PRN
Status: DISCONTINUED | OUTPATIENT
Start: 2018-10-01 | End: 2018-10-03 | Stop reason: HOSPADM

## 2018-10-01 RX ADMIN — HYDROCODONE BITARTRATE AND ACETAMINOPHEN 1 TABLET: 5; 325 TABLET ORAL at 10:36

## 2018-10-01 RX ADMIN — METRONIDAZOLE 500 MG: 500 INJECTION, SOLUTION INTRAVENOUS at 18:25

## 2018-10-01 RX ADMIN — LEVOTHYROXINE SODIUM 125 MCG: 125 TABLET ORAL at 10:32

## 2018-10-01 RX ADMIN — KETOROLAC TROMETHAMINE 15 MG: 15 INJECTION, SOLUTION INTRAMUSCULAR; INTRAVENOUS at 10:32

## 2018-10-01 RX ADMIN — METOCLOPRAMIDE 5 MG: 5 INJECTION, SOLUTION INTRAMUSCULAR; INTRAVENOUS at 23:13

## 2018-10-01 RX ADMIN — CIPROFLOXACIN 400 MG: 2 INJECTION, SOLUTION INTRAVENOUS at 11:14

## 2018-10-01 RX ADMIN — ONDANSETRON HYDROCHLORIDE 4 MG: 2 INJECTION, SOLUTION INTRAMUSCULAR; INTRAVENOUS at 16:58

## 2018-10-01 RX ADMIN — KETOROLAC TROMETHAMINE 15 MG: 15 INJECTION, SOLUTION INTRAMUSCULAR; INTRAVENOUS at 16:57

## 2018-10-01 RX ADMIN — KETOROLAC TROMETHAMINE 15 MG: 15 INJECTION, SOLUTION INTRAMUSCULAR; INTRAVENOUS at 23:13

## 2018-10-01 RX ADMIN — METOCLOPRAMIDE 5 MG: 5 INJECTION, SOLUTION INTRAMUSCULAR; INTRAVENOUS at 10:35

## 2018-10-01 RX ADMIN — CIPROFLOXACIN 400 MG: 2 INJECTION, SOLUTION INTRAVENOUS at 21:23

## 2018-10-01 RX ADMIN — METOCLOPRAMIDE 5 MG: 5 INJECTION, SOLUTION INTRAMUSCULAR; INTRAVENOUS at 03:53

## 2018-10-01 RX ADMIN — HYDROMORPHONE HYDROCHLORIDE 1 MG: 1 INJECTION, SOLUTION INTRAMUSCULAR; INTRAVENOUS; SUBCUTANEOUS at 03:54

## 2018-10-01 RX ADMIN — HYDROCODONE BITARTRATE AND ACETAMINOPHEN 1 TABLET: 5; 325 TABLET ORAL at 21:22

## 2018-10-01 RX ADMIN — ENOXAPARIN SODIUM 40 MG: 40 INJECTION SUBCUTANEOUS at 10:34

## 2018-10-01 RX ADMIN — ONDANSETRON HYDROCHLORIDE 4 MG: 2 INJECTION, SOLUTION INTRAMUSCULAR; INTRAVENOUS at 10:35

## 2018-10-01 RX ADMIN — SODIUM CHLORIDE, POTASSIUM CHLORIDE, SODIUM LACTATE AND CALCIUM CHLORIDE: 600; 310; 30; 20 INJECTION, SOLUTION INTRAVENOUS at 10:36

## 2018-10-01 RX ADMIN — METRONIDAZOLE 500 MG: 500 INJECTION, SOLUTION INTRAVENOUS at 13:04

## 2018-10-01 RX ADMIN — Medication 10 ML: at 21:23

## 2018-10-01 RX ADMIN — ONDANSETRON HYDROCHLORIDE 4 MG: 2 INJECTION, SOLUTION INTRAMUSCULAR; INTRAVENOUS at 06:13

## 2018-10-01 RX ADMIN — Medication 10 ML: at 11:18

## 2018-10-01 RX ADMIN — HYDROCODONE BITARTRATE AND ACETAMINOPHEN 1 TABLET: 5; 325 TABLET ORAL at 16:58

## 2018-10-01 ASSESSMENT — PAIN SCALES - GENERAL
PAINLEVEL_OUTOF10: 10
PAINLEVEL_OUTOF10: 4
PAINLEVEL_OUTOF10: 6
PAINLEVEL_OUTOF10: 10
PAINLEVEL_OUTOF10: 10
PAINLEVEL_OUTOF10: 3
PAINLEVEL_OUTOF10: 2

## 2018-10-01 NOTE — PROGRESS NOTES
Hospitalist Progress Note      PCP: Tatiana Vásquez MD    Date of Admission: 9/25/2018    Chief Complaint: patient is experiencing nausea/vomiting and abdominal pain today, had a low grade fever, started on IV antibiotics by primary service     Medications:  Reviewed    Infusion Medications    lactated ringers 100 mL/hr at 10/01/18 1036     Scheduled Medications    ciprofloxacin  400 mg Intravenous Q12H    metroNIDAZOLE  500 mg Intravenous Q8H    metoclopramide  5 mg Intravenous Q8H    enoxaparin  40 mg Subcutaneous Daily    levothyroxine  125 mcg Oral Daily    sodium chloride flush  10 mL Intravenous 2 times per day     PRN Meds: ketorolac, HYDROcodone 5 mg - acetaminophen **OR** HYDROcodone 5 mg - acetaminophen, naloxone, ondansetron, ondansetron, sodium chloride flush      Intake/Output Summary (Last 24 hours) at 10/01/18 1318  Last data filed at 10/01/18 1254   Gross per 24 hour   Intake              350 ml   Output             1095 ml   Net             -745 ml       Exam:    /83   Pulse 110   Temp 100.2 °F (37.9 °C) (Oral)   Resp 18   Wt 196 lb 13.9 oz (89.3 kg)   LMP  (LMP Unknown)   SpO2 92%   BMI 37.20 kg/m²     General appearance: No apparent distress, appears stated age and cooperative. Respiratory:  Clear to auscultation, bilaterally without Rales/Wheezes/Rhonchi. Cardiovascular: Regular rate and rhythm with normal S1/S2 . Abdomen: Soft, tender, colostomy bag present. Musculoskeletal: No clubbing, cyanosis or edema bilaterally. Labs:   Recent Labs      10/01/18   0558   WBC  7.3   HGB  8.0*   HCT  24.0*   PLT  340     Recent Labs      10/01/18   0558   NA  137   K  3.7   CL  99   CO2  25   BUN  7*   CREATININE  0.49*   CALCIUM  7.9*     No results for input(s): AST, ALT, BILIDIR, BILITOT, ALKPHOS in the last 72 hours. No results for input(s): INR in the last 72 hours. No results for input(s): Julian Manjinder in the last 72 hours.     Urinalysis:    Lab Results

## 2018-10-02 PROCEDURE — 6370000000 HC RX 637 (ALT 250 FOR IP): Performed by: SURGERY

## 2018-10-02 PROCEDURE — 6360000002 HC RX W HCPCS: Performed by: SURGERY

## 2018-10-02 PROCEDURE — 99024 POSTOP FOLLOW-UP VISIT: CPT | Performed by: SURGERY

## 2018-10-02 PROCEDURE — 1210000000 HC MED SURG R&B

## 2018-10-02 PROCEDURE — 2580000003 HC RX 258: Performed by: SURGERY

## 2018-10-02 PROCEDURE — 2500000003 HC RX 250 WO HCPCS: Performed by: SURGERY

## 2018-10-02 PROCEDURE — 99212 OFFICE O/P EST SF 10 MIN: CPT

## 2018-10-02 RX ORDER — LIDOCAINE HYDROCHLORIDE 20 MG/ML
INJECTION, SOLUTION INFILTRATION; PERINEURAL
Status: DISPENSED
Start: 2018-10-02 | End: 2018-10-03

## 2018-10-02 RX ADMIN — Medication 10 ML: at 01:26

## 2018-10-02 RX ADMIN — HYDROCODONE BITARTRATE AND ACETAMINOPHEN 1 TABLET: 5; 325 TABLET ORAL at 03:01

## 2018-10-02 RX ADMIN — METRONIDAZOLE 500 MG: 500 INJECTION, SOLUTION INTRAVENOUS at 01:16

## 2018-10-02 RX ADMIN — METRONIDAZOLE 500 MG: 500 INJECTION, SOLUTION INTRAVENOUS at 17:15

## 2018-10-02 RX ADMIN — CIPROFLOXACIN 400 MG: 2 INJECTION, SOLUTION INTRAVENOUS at 20:49

## 2018-10-02 RX ADMIN — KETOROLAC TROMETHAMINE 15 MG: 15 INJECTION, SOLUTION INTRAMUSCULAR; INTRAVENOUS at 20:49

## 2018-10-02 RX ADMIN — ENOXAPARIN SODIUM 40 MG: 40 INJECTION SUBCUTANEOUS at 08:32

## 2018-10-02 RX ADMIN — METRONIDAZOLE 500 MG: 500 INJECTION, SOLUTION INTRAVENOUS at 09:44

## 2018-10-02 RX ADMIN — LEVOTHYROXINE SODIUM 125 MCG: 125 TABLET ORAL at 09:48

## 2018-10-02 RX ADMIN — METOCLOPRAMIDE 5 MG: 5 INJECTION, SOLUTION INTRAMUSCULAR; INTRAVENOUS at 20:49

## 2018-10-02 RX ADMIN — CIPROFLOXACIN 400 MG: 2 INJECTION, SOLUTION INTRAVENOUS at 08:33

## 2018-10-02 RX ADMIN — HYDROCODONE BITARTRATE AND ACETAMINOPHEN 1 TABLET: 5; 325 TABLET ORAL at 08:32

## 2018-10-02 RX ADMIN — METOCLOPRAMIDE 5 MG: 5 INJECTION, SOLUTION INTRAMUSCULAR; INTRAVENOUS at 14:18

## 2018-10-02 RX ADMIN — SODIUM CHLORIDE, POTASSIUM CHLORIDE, SODIUM LACTATE AND CALCIUM CHLORIDE: 600; 310; 30; 20 INJECTION, SOLUTION INTRAVENOUS at 01:15

## 2018-10-02 RX ADMIN — Medication 10 ML: at 20:50

## 2018-10-02 RX ADMIN — HYDROCODONE BITARTRATE AND ACETAMINOPHEN 1 TABLET: 5; 325 TABLET ORAL at 13:49

## 2018-10-02 RX ADMIN — KETOROLAC TROMETHAMINE 15 MG: 15 INJECTION, SOLUTION INTRAMUSCULAR; INTRAVENOUS at 07:51

## 2018-10-02 RX ADMIN — METOCLOPRAMIDE 5 MG: 5 INJECTION, SOLUTION INTRAMUSCULAR; INTRAVENOUS at 03:34

## 2018-10-02 RX ADMIN — KETOROLAC TROMETHAMINE 15 MG: 15 INJECTION, SOLUTION INTRAMUSCULAR; INTRAVENOUS at 15:38

## 2018-10-02 ASSESSMENT — PAIN DESCRIPTION - LOCATION
LOCATION: ABDOMEN
LOCATION: BACK

## 2018-10-02 ASSESSMENT — PAIN DESCRIPTION - ORIENTATION: ORIENTATION: LOWER

## 2018-10-02 ASSESSMENT — PAIN SCALES - GENERAL
PAINLEVEL_OUTOF10: 3
PAINLEVEL_OUTOF10: 2
PAINLEVEL_OUTOF10: 4
PAINLEVEL_OUTOF10: 4
PAINLEVEL_OUTOF10: 3
PAINLEVEL_OUTOF10: 5
PAINLEVEL_OUTOF10: 0
PAINLEVEL_OUTOF10: 4
PAINLEVEL_OUTOF10: 2
PAINLEVEL_OUTOF10: 3

## 2018-10-02 ASSESSMENT — PAIN DESCRIPTION - DESCRIPTORS: DESCRIPTORS: DISCOMFORT

## 2018-10-02 ASSESSMENT — PAIN DESCRIPTION - PAIN TYPE
TYPE: SURGICAL PAIN
TYPE: SURGICAL PAIN

## 2018-10-02 ASSESSMENT — PAIN DESCRIPTION - FREQUENCY: FREQUENCY: CONTINUOUS

## 2018-10-02 NOTE — PROGRESS NOTES
Wound Ostomy Continence Nurse  Ostomy Referral Follow-up Progress Note      NAME:  Mike Gray  MEDICAL RECORD NUMBER:  [id number]  AGE: 77 y.o. GENDER:  female  :  1950  TODAY'S DATE:  10/2/2018    Subjective:    Mike Gray is a 77 y.o. female referred by:   [x] Physician  [] Nursing  [] Other:     Colostomy and New    Objective    BP (!) 141/54   Pulse 86   Temp 97.9 °F (36.6 °C) (Oral)   Resp 16   Wt 196 lb 13.9 oz (89.3 kg)   LMP  (LMP Unknown)   SpO2 99%   BMI 37.20 kg/m²     Elton Risk Score Elton Scale Score: 20    Assessment   Surgeon - Dr. Francia Bardales (Active)   Stomal Appliance Clean;1 piece;Dry; Intact 10/2/2018  9:45 AM   Flange Size (inches) 1.5 Inches 10/2/2018  9:45 AM   Stoma  Assessment Pink;Moist;Protrudes 10/2/2018  9:45 AM   Mucocutaneous Junction Intact 10/2/2018  9:45 AM   Peristomal Assessment Clean; Intact 2018  7:30 PM   Treatment Bag change;Site care; Liquid skin barrier 2018 11:25 AM   Stool Appearance Loose 10/2/2018  9:45 AM   Stool Color Brown 10/2/2018  9:45 AM   Stool Amount Small 10/2/2018  9:45 AM   Output (mL) 100 ml 10/1/2018  9:29 PM   Number of days: 6     Patient is siting up in chair upon entering the room. Patient states she is feeling much better today, has not had any nausea or vomiting over past 24 hrs. patient states she actually feels hungry today. No change in stoma assessment from yesterday. Appliance remains clean, dry, and intact. patient asks some questions pertaining to routine care, which were answered and patient able to demonstrate understanding. No needs at this time. Will continue to follow while in house, but patient is ready for discharge from a stoma care stand point.     Intake/Output Summary (Last 24 hours) at 10/02/18 1100  Last data filed at 10/02/18 0635   Gross per 24 hour   Intake          3674.33 ml   Output             2615 ml   Net          1059.33

## 2018-10-03 ENCOUNTER — APPOINTMENT (OUTPATIENT)
Dept: GENERAL RADIOLOGY | Age: 68
DRG: 982 | End: 2018-10-03
Attending: SURGERY
Payer: MEDICARE

## 2018-10-03 VITALS
BODY MASS INDEX: 37.2 KG/M2 | OXYGEN SATURATION: 97 % | DIASTOLIC BLOOD PRESSURE: 66 MMHG | RESPIRATION RATE: 18 BRPM | TEMPERATURE: 98.2 F | HEART RATE: 69 BPM | SYSTOLIC BLOOD PRESSURE: 143 MMHG | WEIGHT: 196.87 LBS

## 2018-10-03 PROCEDURE — 2580000003 HC RX 258: Performed by: SURGERY

## 2018-10-03 PROCEDURE — 99024 POSTOP FOLLOW-UP VISIT: CPT | Performed by: SURGERY

## 2018-10-03 PROCEDURE — 2500000003 HC RX 250 WO HCPCS: Performed by: SURGERY

## 2018-10-03 PROCEDURE — 6370000000 HC RX 637 (ALT 250 FOR IP): Performed by: SURGERY

## 2018-10-03 PROCEDURE — 74430 CONTRAST X-RAY BLADDER: CPT

## 2018-10-03 PROCEDURE — 6360000004 HC RX CONTRAST MEDICATION: Performed by: SURGERY

## 2018-10-03 PROCEDURE — 6360000002 HC RX W HCPCS: Performed by: SURGERY

## 2018-10-03 PROCEDURE — 6360000002 HC RX W HCPCS: Performed by: ANESTHESIOLOGY

## 2018-10-03 RX ORDER — ONDANSETRON 4 MG/1
4 TABLET, ORALLY DISINTEGRATING ORAL EVERY 8 HOURS PRN
Qty: 30 TABLET | Refills: 1 | Status: SHIPPED | OUTPATIENT
Start: 2018-10-03 | End: 2018-10-10 | Stop reason: ALTCHOICE

## 2018-10-03 RX ORDER — HYDROCODONE BITARTRATE AND ACETAMINOPHEN 5; 325 MG/1; MG/1
1 TABLET ORAL EVERY 6 HOURS PRN
Qty: 25 TABLET | Refills: 0 | Status: SHIPPED | OUTPATIENT
Start: 2018-10-03 | End: 2018-10-10 | Stop reason: ALTCHOICE

## 2018-10-03 RX ORDER — AMOXICILLIN AND CLAVULANATE POTASSIUM 875; 125 MG/1; MG/1
1 TABLET, FILM COATED ORAL 2 TIMES DAILY
Qty: 28 TABLET | Refills: 0 | Status: SHIPPED | OUTPATIENT
Start: 2018-10-03 | End: 2018-10-10

## 2018-10-03 RX ORDER — IBUPROFEN 600 MG/1
TABLET ORAL
Qty: 30 TABLET | Refills: 1 | Status: SHIPPED | OUTPATIENT
Start: 2018-10-03 | End: 2018-11-28 | Stop reason: ALTCHOICE

## 2018-10-03 RX ADMIN — LEVOTHYROXINE SODIUM 125 MCG: 125 TABLET ORAL at 08:40

## 2018-10-03 RX ADMIN — METRONIDAZOLE 500 MG: 500 INJECTION, SOLUTION INTRAVENOUS at 01:35

## 2018-10-03 RX ADMIN — CIPROFLOXACIN 400 MG: 2 INJECTION, SOLUTION INTRAVENOUS at 08:41

## 2018-10-03 RX ADMIN — METOCLOPRAMIDE 5 MG: 5 INJECTION, SOLUTION INTRAMUSCULAR; INTRAVENOUS at 03:18

## 2018-10-03 RX ADMIN — ENOXAPARIN SODIUM 40 MG: 40 INJECTION SUBCUTANEOUS at 08:40

## 2018-10-03 RX ADMIN — IOTHALAMATE MEGLUMINE 150 ML: 172 INJECTION URETERAL at 14:46

## 2018-10-03 RX ADMIN — KETOROLAC TROMETHAMINE 15 MG: 15 INJECTION, SOLUTION INTRAMUSCULAR; INTRAVENOUS at 03:18

## 2018-10-03 RX ADMIN — Medication 10 ML: at 08:41

## 2018-10-03 RX ADMIN — ONDANSETRON HYDROCHLORIDE 4 MG: 2 INJECTION, SOLUTION INTRAMUSCULAR; INTRAVENOUS at 08:43

## 2018-10-03 ASSESSMENT — PAIN SCALES - GENERAL: PAINLEVEL_OUTOF10: 4

## 2018-10-04 ENCOUNTER — OFFICE VISIT (OUTPATIENT)
Dept: SURGERY | Age: 68
End: 2018-10-04

## 2018-10-04 ENCOUNTER — CARE COORDINATION (OUTPATIENT)
Dept: CASE MANAGEMENT | Age: 68
End: 2018-10-04

## 2018-10-04 VITALS
HEIGHT: 61 IN | BODY MASS INDEX: 34.17 KG/M2 | WEIGHT: 181 LBS | SYSTOLIC BLOOD PRESSURE: 130 MMHG | DIASTOLIC BLOOD PRESSURE: 62 MMHG | TEMPERATURE: 96.9 F

## 2018-10-04 DIAGNOSIS — N82.4 COLOVAGINAL FISTULA: ICD-10-CM

## 2018-10-04 DIAGNOSIS — N32.1 COLOVESICAL FISTULA: Primary | ICD-10-CM

## 2018-10-04 DIAGNOSIS — N32.1 COLO-VESICAL FISTULA: Primary | ICD-10-CM

## 2018-10-04 DIAGNOSIS — K57.32 SIGMOID DIVERTICULITIS: ICD-10-CM

## 2018-10-04 PROCEDURE — 99024 POSTOP FOLLOW-UP VISIT: CPT | Performed by: SURGERY

## 2018-10-04 NOTE — CARE COORDINATION
[facility] Up [facility]    Call within 2 business days of discharge: Yes    Patient: Cesar Man Patient : 1950   MRN: [id number]  Reason for Admission: -10/3/18 [facility] Inpatient s/p Sigmoid resection with end sigmoid colostomy cystoscopy bilateral retrograde pyelogram bilateral ureteral stent placement. Discharge Date: 10/3/18 RARS: Readmission Risk Score: 7   CM 0  [facility]  PCP: Frank Choi MD     Spoke with: Cesar Man  Pt reports very minimal discomfort and denies acute pain. Up independently. Had glover removed in office 10/4. Urinating without any difficulty. Reviewed s/s UTI to report to physician. Current [facility] services. Pt shares some anxiety as she will attempt her first independent ostomy bag change & self care today under nursing supervision. Waiting nurse arrival with new supplies during this conversation. We reviewed steps on the phone. Denies any other home/DME needs at this time. Post-op Dr Diego Morin 10/5/18 1:40. FU Dr Rahel Bashir 10/10/18 9:30. FU Dr Phyllis Cross 18 2:15. Denies any transportation needs. Med rec & 1111F order completed. Pt denies any current med cost concerns. START taking:  amoxicillin-clavulanate (AUGMENTIN)  HYDROcodone-acetaminophen (NORCO)  ibuprofen (ADVIL;MOTRIN)  ondansetron (ZOFRAN ODT)   CHANGE how you take:  metoprolol succinate (TOPROL XL)   STOP taking:  erythromycin 250 MG Cpep extended release capsule (RAPHAEL-CAP)  potassium chloride 20 MEQ extended release tablet (KLOR-CON M)    Non-face-to-face services provided:  Obtained and reviewed discharge summary and/or continuity of care documents  Education of patient/family/caregiver/guardian to support self-management-Reviewed post-op s/s infection to report to surgeon.  Reviewed to plan regular colostomy pouch changes every 3-5 days and PRN for leaks etc. Advised to date the tape on the pouch or estelle calendar as reminder to when the pouch was last

## 2018-10-05 ENCOUNTER — OFFICE VISIT (OUTPATIENT)
Dept: SURGERY | Age: 68
End: 2018-10-05

## 2018-10-05 VITALS
SYSTOLIC BLOOD PRESSURE: 140 MMHG | HEIGHT: 60 IN | TEMPERATURE: 97.5 F | BODY MASS INDEX: 35.14 KG/M2 | DIASTOLIC BLOOD PRESSURE: 80 MMHG | WEIGHT: 179 LBS

## 2018-10-05 DIAGNOSIS — K57.32 SIGMOID DIVERTICULITIS: Primary | ICD-10-CM

## 2018-10-05 PROCEDURE — 99024 POSTOP FOLLOW-UP VISIT: CPT | Performed by: SURGERY

## 2018-10-08 NOTE — DISCHARGE SUMMARY
also and eventually she was under  control with the Norco and ibuprofen. Postoperatively, she also had  problems with leakage of her stoma and she leaked several times onto  the incision, so I placed her empirically on Cipro and Flagyl, which  also made her sick to her stomach, so I placed her on Augmentin, to go  home with empirically. Eventually, she was eating well and ambulating  well. Prior to discharge, she did have a cystogram done, which  revealed that the bladder was intact with no evidence of any leakage. At the time of discharge, she was ambulating on her own, tolerating a  diet, and knew how to care for her colostomy, glover and her Kareem-Rosas  drain.         Jose Tai MD    D: 10/07/2018 17:40:28       T: 10/08/2018 11:30:09     [email or web address]  Job#: [id number]     Doc#: [id number]    CC:

## 2018-10-10 ENCOUNTER — OFFICE VISIT (OUTPATIENT)
Dept: SURGERY | Age: 68
End: 2018-10-10

## 2018-10-10 ENCOUNTER — OFFICE VISIT (OUTPATIENT)
Dept: UROLOGY | Age: 68
End: 2018-10-10
Payer: MEDICARE

## 2018-10-10 VITALS
WEIGHT: 174 LBS | TEMPERATURE: 97.3 F | BODY MASS INDEX: 34.16 KG/M2 | SYSTOLIC BLOOD PRESSURE: 130 MMHG | HEIGHT: 60 IN | DIASTOLIC BLOOD PRESSURE: 80 MMHG

## 2018-10-10 VITALS
WEIGHT: 174 LBS | DIASTOLIC BLOOD PRESSURE: 72 MMHG | SYSTOLIC BLOOD PRESSURE: 124 MMHG | HEART RATE: 76 BPM | BODY MASS INDEX: 34.16 KG/M2 | HEIGHT: 60 IN

## 2018-10-10 DIAGNOSIS — K57.32 SIGMOID DIVERTICULITIS: Primary | ICD-10-CM

## 2018-10-10 DIAGNOSIS — N32.1 COLO-VESICAL FISTULA: ICD-10-CM

## 2018-10-10 DIAGNOSIS — N82.4 COLOVAGINAL FISTULA: ICD-10-CM

## 2018-10-10 DIAGNOSIS — R31.29 MICROSCOPIC HEMATURIA: Primary | ICD-10-CM

## 2018-10-10 LAB
BILIRUBIN, POC: ABNORMAL
BLOOD URINE, POC: ABNORMAL
CLARITY, POC: CLEAR
COLOR, POC: ABNORMAL
GLUCOSE URINE, POC: ABNORMAL
KETONES, POC: ABNORMAL
LEUKOCYTE EST, POC: ABNORMAL
NITRITE, POC: ABNORMAL
PH, POC: 5.5
PROTEIN, POC: ABNORMAL
SPECIFIC GRAVITY, POC: 1.03
UROBILINOGEN, POC: 1

## 2018-10-10 PROCEDURE — 99024 POSTOP FOLLOW-UP VISIT: CPT | Performed by: SURGERY

## 2018-10-10 PROCEDURE — 99024 POSTOP FOLLOW-UP VISIT: CPT | Performed by: UROLOGY

## 2018-10-10 PROCEDURE — 81003 URINALYSIS AUTO W/O SCOPE: CPT | Performed by: UROLOGY

## 2018-10-10 ASSESSMENT — ENCOUNTER SYMPTOMS: ABDOMINAL PAIN: 0

## 2018-10-10 NOTE — PROGRESS NOTES
(AUGMENTIN) 875-125 MG per tablet Take 1 tablet by mouth 2 times daily for 7 days 28 tablet 0    metoprolol succinate (TOPROL XL) 50 MG extended release tablet TAKE 1 TABLET BY MOUTH EVERY DAY 90 tablet 3    valACYclovir (VALTREX) 1 g tablet TAKE 2 TABLETS BY MOUTH TWICE DAILY FOR 1 DAY (Patient taking differently: prn) 4 tablet 5    levothyroxine (SYNTHROID) 125 MCG tablet Take 1 tablet by mouth daily 90 tablet 3     No current facility-administered medications for this visit. Patient has no known allergies. reviewed    Review of Systems   Constitutional: Negative for fever. Gastrointestinal: Negative for abdominal pain. Genitourinary: Negative for dysuria and hematuria. Objective:   Physical Exam   Constitutional: She appears well-developed and well-nourished. Neurological: She is alert. Psychiatric: She has a normal mood and affect. Her behavior is normal.   10/10/2018  9:55 AM - Giuliana Sung CMA ([facility])     Component Results     Component Collected Lab   Color, UA 10/10/2018  9:54 AM Unknown   dark yellow    Clarity, UA 10/10/2018  9:54 AM Unknown   clear    Glucose, UA POC 10/10/2018  9:54 AM Unknown   neg    Bilirubin, UA 10/10/2018  9:54 AM Unknown   small    Ketones, UA 10/10/2018  9:54 AM Unknown   trace    Spec Grav, UA 10/10/2018  9:54 AM Unknown   1.030    Blood, UA POC 10/10/2018  9:54 AM Unknown   trace    pH, UA 10/10/2018  9:54 AM Unknown   5.5    Protein, UA POC 10/10/2018  9:54 AM Unknown   small    Urobilinogen, UA 10/10/2018  9:54 AM Unknown   1    Leukocytes, UA 10/10/2018  9:54 AM Unknown   neg    Nitrite, UA 10/10/2018  9:54 AM Unknown   neg          Assessment: This is a 76 yo female with HTN, Hypothyroidism and back with recurrent UTI's and hematuria with colovesical fistula now s/p colectomy and colostomy and she is back to baseline voiding without evidence for UTI by U/A today.  I recommend csytoscopy in 3 mo to confirm healing of the fistula and resolution of

## 2018-10-22 ENCOUNTER — OFFICE VISIT (OUTPATIENT)
Dept: SURGERY | Age: 68
End: 2018-10-22

## 2018-10-22 ENCOUNTER — OFFICE VISIT (OUTPATIENT)
Dept: FAMILY MEDICINE CLINIC | Age: 68
End: 2018-10-22
Payer: MEDICARE

## 2018-10-22 ENCOUNTER — CLINICAL DOCUMENTATION (OUTPATIENT)
Dept: FAMILY MEDICINE CLINIC | Age: 68
End: 2018-10-22

## 2018-10-22 ENCOUNTER — HOSPITAL ENCOUNTER (OUTPATIENT)
Dept: GENERAL RADIOLOGY | Age: 68
Discharge: HOME | End: 2018-10-24
Payer: MEDICARE

## 2018-10-22 VITALS
HEART RATE: 70 BPM | WEIGHT: 173 LBS | BODY MASS INDEX: 33.96 KG/M2 | HEIGHT: 60 IN | DIASTOLIC BLOOD PRESSURE: 66 MMHG | TEMPERATURE: 98.3 F | RESPIRATION RATE: 16 BRPM | SYSTOLIC BLOOD PRESSURE: 120 MMHG

## 2018-10-22 VITALS
WEIGHT: 176 LBS | DIASTOLIC BLOOD PRESSURE: 76 MMHG | TEMPERATURE: 97.6 F | HEIGHT: 60 IN | BODY MASS INDEX: 34.55 KG/M2 | SYSTOLIC BLOOD PRESSURE: 122 MMHG

## 2018-10-22 DIAGNOSIS — D64.9 ANEMIA, UNSPECIFIED TYPE: ICD-10-CM

## 2018-10-22 DIAGNOSIS — K57.32 SIGMOID DIVERTICULITIS: ICD-10-CM

## 2018-10-22 DIAGNOSIS — Z23 NEED FOR INFLUENZA VACCINATION: ICD-10-CM

## 2018-10-22 DIAGNOSIS — K94.00 COLOSTOMY COMPLICATION (HCC): ICD-10-CM

## 2018-10-22 DIAGNOSIS — K57.32 SIGMOID DIVERTICULITIS: Primary | ICD-10-CM

## 2018-10-22 DIAGNOSIS — E03.9 HYPOTHYROIDISM, UNSPECIFIED TYPE: Primary | ICD-10-CM

## 2018-10-22 DIAGNOSIS — F41.9 ANXIETY: ICD-10-CM

## 2018-10-22 PROBLEM — N32.1 COLOVESICAL FISTULA: Status: RESOLVED | Noted: 2018-09-25 | Resolved: 2018-10-22

## 2018-10-22 PROCEDURE — 3017F COLORECTAL CA SCREEN DOC REV: CPT | Performed by: FAMILY MEDICINE

## 2018-10-22 PROCEDURE — 1101F PT FALLS ASSESS-DOCD LE1/YR: CPT | Performed by: FAMILY MEDICINE

## 2018-10-22 PROCEDURE — 1123F ACP DISCUSS/DSCN MKR DOCD: CPT | Performed by: FAMILY MEDICINE

## 2018-10-22 PROCEDURE — 99214 OFFICE O/P EST MOD 30 MIN: CPT | Performed by: FAMILY MEDICINE

## 2018-10-22 PROCEDURE — 99024 POSTOP FOLLOW-UP VISIT: CPT | Performed by: SURGERY

## 2018-10-22 PROCEDURE — 1090F PRES/ABSN URINE INCON ASSESS: CPT | Performed by: FAMILY MEDICINE

## 2018-10-22 PROCEDURE — G8417 CALC BMI ABV UP PARAM F/U: HCPCS | Performed by: FAMILY MEDICINE

## 2018-10-22 PROCEDURE — 1111F DSCHRG MED/CURRENT MED MERGE: CPT | Performed by: FAMILY MEDICINE

## 2018-10-22 PROCEDURE — 90662 IIV NO PRSV INCREASED AG IM: CPT | Performed by: FAMILY MEDICINE

## 2018-10-22 PROCEDURE — G8482 FLU IMMUNIZE ORDER/ADMIN: HCPCS | Performed by: FAMILY MEDICINE

## 2018-10-22 PROCEDURE — 1036F TOBACCO NON-USER: CPT | Performed by: FAMILY MEDICINE

## 2018-10-22 PROCEDURE — G0008 ADMIN INFLUENZA VIRUS VAC: HCPCS | Performed by: FAMILY MEDICINE

## 2018-10-22 PROCEDURE — G8400 PT W/DXA NO RESULTS DOC: HCPCS | Performed by: FAMILY MEDICINE

## 2018-10-22 PROCEDURE — 4040F PNEUMOC VAC/ADMIN/RCVD: CPT | Performed by: FAMILY MEDICINE

## 2018-10-22 PROCEDURE — G8427 DOCREV CUR MEDS BY ELIG CLIN: HCPCS | Performed by: FAMILY MEDICINE

## 2018-10-22 PROCEDURE — 74019 RADEX ABDOMEN 2 VIEWS: CPT

## 2018-10-22 RX ORDER — VALACYCLOVIR HYDROCHLORIDE 1 G/1
TABLET, FILM COATED ORAL
Qty: 4 TABLET | Refills: 5 | Status: SHIPPED | OUTPATIENT
Start: 2018-10-22 | End: 2018-11-28 | Stop reason: ALTCHOICE

## 2018-10-22 RX ORDER — FLUOXETINE 10 MG/1
10 CAPSULE ORAL DAILY
Qty: 90 CAPSULE | Refills: 0 | Status: SHIPPED | OUTPATIENT
Start: 2018-10-22 | End: 2018-11-28 | Stop reason: ALTCHOICE

## 2018-10-22 RX ORDER — FLUOXETINE 10 MG/1
10 CAPSULE ORAL DAILY
Qty: 30 CAPSULE | Refills: 3 | Status: SHIPPED | OUTPATIENT
Start: 2018-10-22 | End: 2018-10-22 | Stop reason: SDUPTHER

## 2018-10-22 RX ORDER — ALPRAZOLAM 0.5 MG/1
TABLET ORAL
Qty: 30 TABLET | Refills: 1 | Status: SHIPPED | OUTPATIENT
Start: 2018-10-22 | End: 2019-02-15 | Stop reason: SDUPTHER

## 2018-10-22 RX ORDER — LEVOTHYROXINE SODIUM 0.12 MG/1
125 TABLET ORAL DAILY
Qty: 90 TABLET | Refills: 3 | Status: SHIPPED | OUTPATIENT
Start: 2018-10-22

## 2018-10-22 ASSESSMENT — PATIENT HEALTH QUESTIONNAIRE - PHQ9
SUM OF ALL RESPONSES TO PHQ QUESTIONS 1-9: 0
SUM OF ALL RESPONSES TO PHQ9 QUESTIONS 1 & 2: 0
1. LITTLE INTEREST OR PLEASURE IN DOING THINGS: 0
2. FEELING DOWN, DEPRESSED OR HOPELESS: 0
SUM OF ALL RESPONSES TO PHQ QUESTIONS 1-9: 0

## 2018-10-22 NOTE — PROGRESS NOTES
Vaccine Information Sheet, \"Influenza - Inactivated\"  given to Zoie Denney, or parent/legal guardian of  Zoie Denney and verbalized understanding. Patient responses:    Have you ever had a reaction to a flu vaccine? No  Are you able to eat eggs without adverse effects? Yes  Do you have any current illness? No  Have you ever had Guillian [place] Syndrome? No    Flu vaccine given per order. Please see immunization tab.
09/25/2018 158* 74 - 109 mg/dL Final    BUN 09/25/2018 11  8 - 23 mg/dL Final    CREATININE 09/25/2018 0.57  0.50 - 0.90 mg/dL Final    GFR Non- 09/25/2018 >60.0  >60 Final    Comment: >60 mL/min/1.73m2 EGFR, calc. for ages 25 and older using the  MDRD formula (not corrected for weight), is valid for stable  renal function.  GFR  09/25/2018 >60.0  >60 Final    Comment: >60 mL/min/1.73m2 EGFR, calc. for ages 25 and older using the  MDRD formula (not corrected for weight), is valid for stable  renal function.  Calcium 09/25/2018 7.0* 8.6 - 10.2 mg/dL Final    Sodium 09/26/2018 138  132 - 144 mEq/L Final    Potassium reflex Magnesium 09/26/2018 4.1  3.5 - 5.1 mEq/L Final    Chloride 09/26/2018 109* 98 - 107 mEq/L Final    CO2 09/26/2018 20* 22 - 29 mEq/L Final    Anion Gap 09/26/2018 9  7 - 13 mEq/L Final    Glucose 09/26/2018 161* 74 - 109 mg/dL Final    BUN 09/26/2018 17  8 - 23 mg/dL Final    CREATININE 09/26/2018 0.83  0.50 - 0.90 mg/dL Final    GFR Non- 09/26/2018 >60.0  >60 Final    Comment: >60 mL/min/1.73m2 EGFR, calc. for ages 25 and older using the  MDRD formula (not corrected for weight), is valid for stable  renal function.  GFR  09/26/2018 >60.0  >60 Final    Comment: >60 mL/min/1.73m2 EGFR, calc. for ages 25 and older using the  MDRD formula (not corrected for weight), is valid for stable  renal function.       Calcium 09/26/2018 7.1* 8.6 - 10.2 mg/dL Final    Total Protein 09/26/2018 4.2* 6.4 - 8.1 g/dL Final    Alb 09/26/2018 2.5* 3.9 - 4.9 g/dL Final    Total Bilirubin 09/26/2018 0.3  0.0 - 1.2 mg/dL Final    Alkaline Phosphatase 09/26/2018 35* 40 - 130 U/L Final    ALT 09/26/2018 18  0 - 33 U/L Final    AST 09/26/2018 19  0 - 35 U/L Final    Globulin 09/26/2018 1.7* 2.3 - 3.5 g/dL Final    WBC 09/26/2018 13.7* 4.8 - 10.8 K/uL Final    RBC 09/26/2018 2.81* 4.20 - 5.40 M/uL Final    Hemoglobin 09/26/2018

## 2018-10-29 ENCOUNTER — OFFICE VISIT (OUTPATIENT)
Dept: SURGERY | Age: 68
End: 2018-10-29

## 2018-10-29 VITALS
BODY MASS INDEX: 34.34 KG/M2 | WEIGHT: 174.9 LBS | SYSTOLIC BLOOD PRESSURE: 130 MMHG | HEIGHT: 60 IN | TEMPERATURE: 98.7 F | DIASTOLIC BLOOD PRESSURE: 72 MMHG

## 2018-10-29 DIAGNOSIS — K57.32 SIGMOID DIVERTICULITIS: Primary | ICD-10-CM

## 2018-10-29 DIAGNOSIS — N82.4 COLOVAGINAL FISTULA: ICD-10-CM

## 2018-10-29 DIAGNOSIS — N32.1 COLO-VESICAL FISTULA: ICD-10-CM

## 2018-10-29 PROCEDURE — 99024 POSTOP FOLLOW-UP VISIT: CPT | Performed by: SURGERY

## 2018-10-29 RX ORDER — AMOXICILLIN AND CLAVULANATE POTASSIUM 875; 125 MG/1; MG/1
1 TABLET, FILM COATED ORAL 2 TIMES DAILY
Qty: 14 TABLET | Refills: 1 | Status: SHIPPED | OUTPATIENT
Start: 2018-10-29 | End: 2018-11-05

## 2018-11-28 ENCOUNTER — OFFICE VISIT (OUTPATIENT)
Dept: CARDIOLOGY CLINIC | Age: 68
End: 2018-11-28
Payer: MEDICARE

## 2018-11-28 VITALS
BODY MASS INDEX: 35.18 KG/M2 | WEIGHT: 179.2 LBS | HEART RATE: 77 BPM | DIASTOLIC BLOOD PRESSURE: 78 MMHG | HEIGHT: 60 IN | SYSTOLIC BLOOD PRESSURE: 136 MMHG | OXYGEN SATURATION: 98 %

## 2018-11-28 DIAGNOSIS — R00.2 HEART PALPITATIONS: Primary | ICD-10-CM

## 2018-11-28 PROCEDURE — 1123F ACP DISCUSS/DSCN MKR DOCD: CPT | Performed by: INTERNAL MEDICINE

## 2018-11-28 PROCEDURE — 1090F PRES/ABSN URINE INCON ASSESS: CPT | Performed by: INTERNAL MEDICINE

## 2018-11-28 PROCEDURE — 4040F PNEUMOC VAC/ADMIN/RCVD: CPT | Performed by: INTERNAL MEDICINE

## 2018-11-28 PROCEDURE — G8417 CALC BMI ABV UP PARAM F/U: HCPCS | Performed by: INTERNAL MEDICINE

## 2018-11-28 PROCEDURE — 3017F COLORECTAL CA SCREEN DOC REV: CPT | Performed by: INTERNAL MEDICINE

## 2018-11-28 PROCEDURE — 1036F TOBACCO NON-USER: CPT | Performed by: INTERNAL MEDICINE

## 2018-11-28 PROCEDURE — 99212 OFFICE O/P EST SF 10 MIN: CPT | Performed by: INTERNAL MEDICINE

## 2018-11-28 PROCEDURE — G8400 PT W/DXA NO RESULTS DOC: HCPCS | Performed by: INTERNAL MEDICINE

## 2018-11-28 PROCEDURE — G8427 DOCREV CUR MEDS BY ELIG CLIN: HCPCS | Performed by: INTERNAL MEDICINE

## 2018-11-28 PROCEDURE — 1101F PT FALLS ASSESS-DOCD LE1/YR: CPT | Performed by: INTERNAL MEDICINE

## 2018-11-28 PROCEDURE — G8482 FLU IMMUNIZE ORDER/ADMIN: HCPCS | Performed by: INTERNAL MEDICINE

## 2018-11-28 NOTE — PROGRESS NOTES
[facility] CARDIOLOGY OFFICE FOLLOW-UP      Patient: Dylan Tucker  YOB: 1950  MRN: [id number]    Chief Complaint:  Chief Complaint   Patient presents with    6 Month Follow-Up    Palpitations       Subjective/HPI    The patient is followed in the office chronically for the following problems: palpitations    The last office visit focused on the following: followup    Since the last office visit, the patient has not had new symptoms/events. Patient wants to stop metoprolol. Past Medical History:   Diagnosis Date    Cancer [facility])     Chest pain in adult 4/23/2016    Heart palpitations 4/28/2016    Hypothyroidism     meds > 5 yrs    Other chronic cystitis with hematuria     SOB (shortness of breath) 4/23/2016       Past Surgical History:   Procedure Laterality Date    COLONOSCOPY  2007    (-)    COLONOSCOPY  06/20/14    DR. BLACK-polyp    [place]    GALLBLADDER SURGERY      HYSTERECTOMY      MOHS SURGERY Left 06/22/2017    Yue Sun MD [facility]    [place] Catheter, ureteral Bilateral 9/25/2018    CYSTOSCOPY BILATERAL RETROGRADE PYELOGRAM BILATERAL URETERAL STENT PLACEMENT performed by Frances Chung MD at [address] [place] [address] COLON/ILEOSTOMY N/A 9/25/2018    SIGMOID RESECTION WITH END SIGMOID COLOSTOMY performed by Jeri Giles MD at [facility]       Family History   Problem Relation Age of Onset    Cancer Mother     Colon Cancer Brother        Social History     Social History    Marital status:       Spouse name: N/A    Number of children: N/A    Years of education: N/A     Social History Main Topics    Smoking status: Never Smoker    Smokeless tobacco: Never Used    Alcohol use No    Drug use: No    Sexual activity: Not Currently     Other Topics Concern    Not on file     Social History Narrative    No narrative on file       No Known Allergies    Current Outpatient Prescriptions   Medication Sig Dispense Refill    levothyroxine

## 2018-11-30 ENCOUNTER — OFFICE VISIT (OUTPATIENT)
Dept: SURGERY | Age: 68
End: 2018-11-30

## 2018-11-30 VITALS
SYSTOLIC BLOOD PRESSURE: 136 MMHG | WEIGHT: 178 LBS | BODY MASS INDEX: 34.95 KG/M2 | HEIGHT: 60 IN | TEMPERATURE: 99 F | DIASTOLIC BLOOD PRESSURE: 80 MMHG

## 2018-11-30 DIAGNOSIS — N32.1 COLO-VESICAL FISTULA: ICD-10-CM

## 2018-11-30 DIAGNOSIS — N82.4 COLOVAGINAL FISTULA: ICD-10-CM

## 2018-11-30 DIAGNOSIS — K57.32 SIGMOID DIVERTICULITIS: Primary | ICD-10-CM

## 2018-11-30 PROCEDURE — 99024 POSTOP FOLLOW-UP VISIT: CPT | Performed by: SURGERY

## 2018-11-30 RX ORDER — AMOXICILLIN AND CLAVULANATE POTASSIUM 875; 125 MG/1; MG/1
1 TABLET, FILM COATED ORAL 2 TIMES DAILY
Qty: 28 TABLET | Refills: 1 | Status: SHIPPED | OUTPATIENT
Start: 2018-11-30 | End: 2018-12-14

## 2018-11-30 NOTE — PROGRESS NOTES
Subjective:      Patient ID: Harmeet Newton is a 77 y.o. female. HPI  Harmeet Newton returns in follow up of a sigmoid colectomy with end sigmoid colostomy done September 25, 2018 for diverticulitis with colovesical and colovaginal fistulas. The patient stoma is working well. She is concerned about pasty stool. She did not mention appliance still leaks. Appetite is good. The patient is using Motrin. She is now complaining of mild stoma fullness. Pt is at home and convalescing well. She has had no incision drainage but has noticed some hardness. She denies any fevers chills or sweats. She denies urinary symptoms or abdominal pain. Review of Systems    Objective:   Physical Exam   Constitutional: She is oriented to person, place, and time. She appears well-developed and well-nourished. No distress. Abdominal:       Incision is well approximated, erythema is not present, swelling is minimal, no evidence of hernia, mild tenderness,  No drainage present in the incision,   LLQ stoma patent with thick pasty stool in the bag, no skin excoriation around the stoma, no bleeding   Musculoskeletal:   Normal gait   Neurological: She is alert and oriented to person, place, and time. Psychiatric: She has a normal mood and affect. Judgment normal.     /80   Temp 99 °F (37.2 °C) (Temporal)   Ht 5' (1.524 m)   Wt 178 lb (80.7 kg)   LMP  (LMP Unknown)   BMI 34.76 kg/m²   Assessment:      Doing well from a surgery standpoint  Significant improvement since her last visit      Plan:      Augmentin bid   The patient is instructed to return to see me in 2 weeks to re evaluate the incision.              Darryl Rock MD

## 2018-12-01 RX ORDER — FLUOXETINE 10 MG/1
10 CAPSULE ORAL DAILY
Qty: 90 CAPSULE | Refills: 3 | Status: SHIPPED | OUTPATIENT
Start: 2018-12-01 | End: 2018-12-18 | Stop reason: ALTCHOICE

## 2018-12-13 ENCOUNTER — OFFICE VISIT (OUTPATIENT)
Dept: SURGERY | Age: 68
End: 2018-12-13

## 2018-12-13 VITALS
TEMPERATURE: 99.3 F | BODY MASS INDEX: 34.95 KG/M2 | DIASTOLIC BLOOD PRESSURE: 82 MMHG | SYSTOLIC BLOOD PRESSURE: 138 MMHG | WEIGHT: 178 LBS | HEIGHT: 60 IN

## 2018-12-13 DIAGNOSIS — N82.4 COLOVAGINAL FISTULA: Primary | ICD-10-CM

## 2018-12-13 DIAGNOSIS — N32.1 COLO-VESICAL FISTULA: ICD-10-CM

## 2018-12-13 DIAGNOSIS — K57.32 SIGMOID DIVERTICULITIS: ICD-10-CM

## 2018-12-13 PROCEDURE — 99024 POSTOP FOLLOW-UP VISIT: CPT | Performed by: SURGERY

## 2018-12-18 ENCOUNTER — PROCEDURE VISIT (OUTPATIENT)
Dept: UROLOGY | Age: 68
End: 2018-12-18
Payer: MEDICARE

## 2018-12-18 VITALS
SYSTOLIC BLOOD PRESSURE: 124 MMHG | HEIGHT: 60 IN | BODY MASS INDEX: 34.55 KG/M2 | WEIGHT: 176 LBS | DIASTOLIC BLOOD PRESSURE: 88 MMHG | HEART RATE: 68 BPM

## 2018-12-18 DIAGNOSIS — N32.1 COLOVESICAL FISTULA: ICD-10-CM

## 2018-12-18 DIAGNOSIS — N39.0 RECURRENT URINARY TRACT INFECTION: Primary | ICD-10-CM

## 2018-12-18 LAB
BILIRUBIN, POC: ABNORMAL
BLOOD URINE, POC: ABNORMAL
CLARITY, POC: CLEAR
COLOR, POC: YELLOW
GLUCOSE URINE, POC: ABNORMAL
KETONES, POC: ABNORMAL
LEUKOCYTE EST, POC: ABNORMAL
NITRITE, POC: ABNORMAL
PH, POC: 5.5
PROTEIN, POC: ABNORMAL
SPECIFIC GRAVITY, POC: >=1.03
UROBILINOGEN, POC: 0.2

## 2018-12-18 PROCEDURE — 99212 OFFICE O/P EST SF 10 MIN: CPT | Performed by: UROLOGY

## 2018-12-18 PROCEDURE — 52000 CYSTOURETHROSCOPY: CPT | Performed by: UROLOGY

## 2018-12-18 PROCEDURE — 1090F PRES/ABSN URINE INCON ASSESS: CPT | Performed by: UROLOGY

## 2018-12-18 PROCEDURE — 4040F PNEUMOC VAC/ADMIN/RCVD: CPT | Performed by: UROLOGY

## 2018-12-18 PROCEDURE — 3017F COLORECTAL CA SCREEN DOC REV: CPT | Performed by: UROLOGY

## 2018-12-18 PROCEDURE — G8400 PT W/DXA NO RESULTS DOC: HCPCS | Performed by: UROLOGY

## 2018-12-18 PROCEDURE — 1123F ACP DISCUSS/DSCN MKR DOCD: CPT | Performed by: UROLOGY

## 2018-12-18 PROCEDURE — 1036F TOBACCO NON-USER: CPT | Performed by: UROLOGY

## 2018-12-18 PROCEDURE — G8417 CALC BMI ABV UP PARAM F/U: HCPCS | Performed by: UROLOGY

## 2018-12-18 PROCEDURE — 81003 URINALYSIS AUTO W/O SCOPE: CPT | Performed by: UROLOGY

## 2018-12-18 PROCEDURE — G8427 DOCREV CUR MEDS BY ELIG CLIN: HCPCS | Performed by: UROLOGY

## 2018-12-18 PROCEDURE — 1101F PT FALLS ASSESS-DOCD LE1/YR: CPT | Performed by: UROLOGY

## 2018-12-18 PROCEDURE — G8482 FLU IMMUNIZE ORDER/ADMIN: HCPCS | Performed by: UROLOGY

## 2018-12-18 RX ORDER — SULFAMETHOXAZOLE AND TRIMETHOPRIM 800; 160 MG/1; MG/1
1 TABLET ORAL ONCE
Qty: 1 TABLET | Refills: 0 | COMMUNITY
Start: 2018-12-18 | End: 2018-12-18

## 2018-12-18 ASSESSMENT — ENCOUNTER SYMPTOMS
ABDOMINAL PAIN: 0
ABDOMINAL DISTENTION: 0

## 2018-12-18 NOTE — PROGRESS NOTES
Subjective:      Patient ID: Jalyn Carlson is a 77 y.o. female. HPI  This is a 76 yo female with HTN, Hypothyroidism and back with recurrent UTI's and hematuria with colovesical fistula now s/p colectomy and colostomy by Dr Wayne Daneil on 9/25/18. She had cystoscopy and bilateral ureteral stents at the time of her colectomy. There was no need for bladder closure and she is here for cystoscopy to confirm resolution of prior area of fistula and inflammatory appearing polyp noted in bladder. Since last seen on 10/10/18, she has no interval UTI's, dysuria or hematuria. She wants to proceed with cystoscopy today. Past Medical History:   Diagnosis Date    Cancer [facility])     Chest pain in adult 4/23/2016    Heart palpitations 4/28/2016    Hypothyroidism     meds > 5 yrs    Other chronic cystitis with hematuria     SOB (shortness of breath) 4/23/2016     Past Surgical History:   Procedure Laterality Date    COLONOSCOPY  2007    (-)    COLONOSCOPY  06/20/14    DR. BLACK-polyp    [place]    GALLBLADDER SURGERY      HYSTERECTOMY      MOHS SURGERY Left 06/22/2017    aMureen Roy MD [place]    [place] Catheter, ureteral Bilateral 9/25/2018    CYSTOSCOPY BILATERAL RETROGRADE PYELOGRAM BILATERAL URETERAL STENT PLACEMENT performed by Barbara Braswell MD at [address] [place] [address] N/A 9/25/2018    SIGMOID RESECTION WITH END SIGMOID COLOSTOMY performed by Carl Levy MD at [place] [address] Marital status:       Spouse name: N/A    Number of children: N/A    Years of education: N/A     Social History Main Topics    Smoking status: Never Smoker    Smokeless tobacco: Never Used    Alcohol use No    Drug use: No    Sexual activity: Not Asked     Other Topics Concern    None     Social History Narrative    None     Family History   Problem Relation Age of Onset    Cancer Mother     Colon Cancer Brother      Current Outpatient Prescriptions

## 2018-12-19 ENCOUNTER — OFFICE VISIT (OUTPATIENT)
Dept: OBGYN CLINIC | Age: 68
End: 2018-12-19
Payer: MEDICARE

## 2018-12-19 VITALS
BODY MASS INDEX: 34.55 KG/M2 | WEIGHT: 176 LBS | SYSTOLIC BLOOD PRESSURE: 128 MMHG | DIASTOLIC BLOOD PRESSURE: 82 MMHG | HEIGHT: 60 IN

## 2018-12-19 DIAGNOSIS — Z78.9 NORMAL VAGINA: Primary | ICD-10-CM

## 2018-12-19 PROCEDURE — 1123F ACP DISCUSS/DSCN MKR DOCD: CPT | Performed by: OBSTETRICS & GYNECOLOGY

## 2018-12-19 PROCEDURE — 1090F PRES/ABSN URINE INCON ASSESS: CPT | Performed by: OBSTETRICS & GYNECOLOGY

## 2018-12-19 PROCEDURE — G8427 DOCREV CUR MEDS BY ELIG CLIN: HCPCS | Performed by: OBSTETRICS & GYNECOLOGY

## 2018-12-19 PROCEDURE — G8482 FLU IMMUNIZE ORDER/ADMIN: HCPCS | Performed by: OBSTETRICS & GYNECOLOGY

## 2018-12-19 PROCEDURE — G8417 CALC BMI ABV UP PARAM F/U: HCPCS | Performed by: OBSTETRICS & GYNECOLOGY

## 2018-12-19 PROCEDURE — 3017F COLORECTAL CA SCREEN DOC REV: CPT | Performed by: OBSTETRICS & GYNECOLOGY

## 2018-12-19 PROCEDURE — 1036F TOBACCO NON-USER: CPT | Performed by: OBSTETRICS & GYNECOLOGY

## 2018-12-19 PROCEDURE — 1101F PT FALLS ASSESS-DOCD LE1/YR: CPT | Performed by: OBSTETRICS & GYNECOLOGY

## 2018-12-19 PROCEDURE — G8400 PT W/DXA NO RESULTS DOC: HCPCS | Performed by: OBSTETRICS & GYNECOLOGY

## 2018-12-19 PROCEDURE — 99203 OFFICE O/P NEW LOW 30 MIN: CPT | Performed by: OBSTETRICS & GYNECOLOGY

## 2018-12-19 PROCEDURE — 4040F PNEUMOC VAC/ADMIN/RCVD: CPT | Performed by: OBSTETRICS & GYNECOLOGY

## 2018-12-20 ASSESSMENT — ENCOUNTER SYMPTOMS
SHORTNESS OF BREATH: 0
ABDOMINAL PAIN: 0
APNEA: 0

## 2018-12-20 NOTE — PROGRESS NOTES
Subjective:      Patient ID:  Cesar Man is a 77 y.o. female with chief complaint of:  Chief Complaint   Patient presents with    Referral - General     Androsko- Fistula        Patient is status post bowel resection secondary to the presence of a rectovaginal fistula. Patient is several years status post hysterectomy secondary to abnormal uterine bleeding. This resection and colostomy was performed by Dr. Jose F Fragoso and patient now prepared for re-anastomosis. Patient denies any passage of gas or discharge to the vagina. Patient denies any odor patient denies any vaginal pain        Past Medical History:   Diagnosis Date    Cancer [facility])     Chest pain in adult 4/23/2016    Heart palpitations 4/28/2016    Hypothyroidism     meds > 5 yrs    Other chronic cystitis with hematuria     SOB (shortness of breath) 4/23/2016     Past Surgical History:   Procedure Laterality Date    COLONOSCOPY  2007    (-)    COLONOSCOPY  06/20/14    DR. BLACK-polyp    [place]  [place]    GALLBLADDER SURGERY      HYSTERECTOMY      MOHS SURGERY Left 06/22/2017    Alma Delia Amelia MD [facility]    [place] Catheter, ureteral Bilateral 9/25/2018    CYSTOSCOPY BILATERAL RETROGRADE PYELOGRAM BILATERAL URETERAL STENT PLACEMENT performed by Elle Levy MD at [address] [facility] COLON/ILEOSTOMY N/A 9/25/2018    SIGMOID RESECTION WITH END SIGMOID COLOSTOMY performed by Warren Headley MD at [address] History   Problem Relation Age of Onset    Cancer Mother     Colon Cancer Brother      Current Outpatient Prescriptions on File Prior to Visit   Medication Sig Dispense Refill    levothyroxine (SYNTHROID) 125 MCG tablet Take 1 tablet by mouth daily 90 tablet 3     No current facility-administered medications on file prior to visit. Allergies:  Patient has no known allergies. Review of Systems   Constitutional: Negative for fatigue and fever. Respiratory: Negative for apnea and shortness of breath.

## 2018-12-27 ENCOUNTER — TELEPHONE (OUTPATIENT)
Dept: SURGERY | Age: 68
End: 2018-12-27

## 2018-12-27 DIAGNOSIS — D64.9 ANEMIA, UNSPECIFIED TYPE: ICD-10-CM

## 2018-12-27 DIAGNOSIS — K94.00 COLOSTOMY COMPLICATION (HCC): ICD-10-CM

## 2018-12-27 LAB
ALBUMIN SERPL-MCNC: 3.9 G/DL (ref 3.9–4.9)
ALP BLD-CCNC: 66 U/L (ref 40–130)
ALT SERPL-CCNC: 11 U/L (ref 0–33)
ANION GAP SERPL CALCULATED.3IONS-SCNC: 13 MEQ/L (ref 7–13)
AST SERPL-CCNC: 13 U/L (ref 0–35)
BILIRUB SERPL-MCNC: <0.2 MG/DL (ref 0–1.2)
BUN BLDV-MCNC: 14 MG/DL (ref 8–23)
CALCIUM SERPL-MCNC: 9.1 MG/DL (ref 8.6–10.2)
CHLORIDE BLD-SCNC: 105 MEQ/L (ref 98–107)
CO2: 24 MEQ/L (ref 22–29)
CREAT SERPL-MCNC: 0.43 MG/DL (ref 0.5–0.9)
FERRITIN: 28.9 NG/ML (ref 13–150)
GFR AFRICAN AMERICAN: >60
GFR NON-AFRICAN AMERICAN: >60
GLOBULIN: 2.9 G/DL (ref 2.3–3.5)
GLUCOSE BLD-MCNC: 100 MG/DL (ref 74–109)
HCT VFR BLD CALC: 40.7 % (ref 37–47)
HEMOGLOBIN: 13.3 G/DL (ref 12–16)
MCH RBC QN AUTO: 27.4 PG (ref 27–31.3)
MCHC RBC AUTO-ENTMCNC: 32.6 % (ref 33–37)
MCV RBC AUTO: 84.3 FL (ref 82–100)
PDW BLD-RTO: 14.5 % (ref 11.5–14.5)
PLATELET # BLD: 248 K/UL (ref 130–400)
POTASSIUM SERPL-SCNC: 4.5 MEQ/L (ref 3.5–5.1)
RBC # BLD: 4.83 M/UL (ref 4.2–5.4)
SODIUM BLD-SCNC: 142 MEQ/L (ref 132–144)
TOTAL PROTEIN: 6.8 G/DL (ref 6.4–8.1)
VITAMIN B-12: 191 PG/ML (ref 232–1245)
WBC # BLD: 4.2 K/UL (ref 4.8–10.8)

## 2018-12-27 RX ORDER — AMOXICILLIN AND CLAVULANATE POTASSIUM 875; 125 MG/1; MG/1
1 TABLET, FILM COATED ORAL 2 TIMES DAILY
Qty: 28 TABLET | Refills: 1 | Status: SHIPPED | OUTPATIENT
Start: 2018-12-27 | End: 2019-01-10

## 2019-01-02 ENCOUNTER — OFFICE VISIT (OUTPATIENT)
Dept: SURGERY | Age: 69
End: 2019-01-02
Payer: MEDICARE

## 2019-01-02 ENCOUNTER — OFFICE VISIT (OUTPATIENT)
Dept: FAMILY MEDICINE CLINIC | Age: 69
End: 2019-01-02
Payer: MEDICARE

## 2019-01-02 VITALS
HEART RATE: 86 BPM | DIASTOLIC BLOOD PRESSURE: 68 MMHG | BODY MASS INDEX: 34.75 KG/M2 | OXYGEN SATURATION: 98 % | SYSTOLIC BLOOD PRESSURE: 130 MMHG | TEMPERATURE: 98.7 F | HEIGHT: 60 IN | RESPIRATION RATE: 18 BRPM | WEIGHT: 177 LBS

## 2019-01-02 VITALS
HEIGHT: 60 IN | WEIGHT: 177 LBS | SYSTOLIC BLOOD PRESSURE: 150 MMHG | BODY MASS INDEX: 34.75 KG/M2 | TEMPERATURE: 98 F | DIASTOLIC BLOOD PRESSURE: 86 MMHG

## 2019-01-02 DIAGNOSIS — F41.9 SEVERE ANXIETY: ICD-10-CM

## 2019-01-02 DIAGNOSIS — K57.32 SIGMOID DIVERTICULITIS: ICD-10-CM

## 2019-01-02 DIAGNOSIS — L98.491 SKIN ULCER, LIMITED TO BREAKDOWN OF SKIN (HCC): ICD-10-CM

## 2019-01-02 DIAGNOSIS — K57.90 DIVERTICULOSIS OF INTESTINE WITHOUT BLEEDING, UNSPECIFIED INTESTINAL TRACT LOCATION: ICD-10-CM

## 2019-01-02 DIAGNOSIS — R45.89 DEPRESSED MOOD: ICD-10-CM

## 2019-01-02 DIAGNOSIS — N82.4 COLOVAGINAL FISTULA: ICD-10-CM

## 2019-01-02 DIAGNOSIS — N32.1 COLO-VESICAL FISTULA: ICD-10-CM

## 2019-01-02 DIAGNOSIS — E03.9 HYPOTHYROIDISM, UNSPECIFIED TYPE: ICD-10-CM

## 2019-01-02 DIAGNOSIS — F41.9 ANXIETY: Primary | ICD-10-CM

## 2019-01-02 DIAGNOSIS — R10.32 LEFT LOWER QUADRANT PAIN: Primary | ICD-10-CM

## 2019-01-02 PROCEDURE — 1101F PT FALLS ASSESS-DOCD LE1/YR: CPT | Performed by: SURGERY

## 2019-01-02 PROCEDURE — 1036F TOBACCO NON-USER: CPT | Performed by: FAMILY MEDICINE

## 2019-01-02 PROCEDURE — 1090F PRES/ABSN URINE INCON ASSESS: CPT | Performed by: SURGERY

## 2019-01-02 PROCEDURE — G8417 CALC BMI ABV UP PARAM F/U: HCPCS | Performed by: SURGERY

## 2019-01-02 PROCEDURE — 1036F TOBACCO NON-USER: CPT | Performed by: SURGERY

## 2019-01-02 PROCEDURE — 1101F PT FALLS ASSESS-DOCD LE1/YR: CPT | Performed by: FAMILY MEDICINE

## 2019-01-02 PROCEDURE — G8482 FLU IMMUNIZE ORDER/ADMIN: HCPCS | Performed by: SURGERY

## 2019-01-02 PROCEDURE — G8400 PT W/DXA NO RESULTS DOC: HCPCS | Performed by: SURGERY

## 2019-01-02 PROCEDURE — 1123F ACP DISCUSS/DSCN MKR DOCD: CPT | Performed by: FAMILY MEDICINE

## 2019-01-02 PROCEDURE — 1090F PRES/ABSN URINE INCON ASSESS: CPT | Performed by: FAMILY MEDICINE

## 2019-01-02 PROCEDURE — G8417 CALC BMI ABV UP PARAM F/U: HCPCS | Performed by: FAMILY MEDICINE

## 2019-01-02 PROCEDURE — 3017F COLORECTAL CA SCREEN DOC REV: CPT | Performed by: SURGERY

## 2019-01-02 PROCEDURE — G8482 FLU IMMUNIZE ORDER/ADMIN: HCPCS | Performed by: FAMILY MEDICINE

## 2019-01-02 PROCEDURE — 99214 OFFICE O/P EST MOD 30 MIN: CPT | Performed by: FAMILY MEDICINE

## 2019-01-02 PROCEDURE — 1123F ACP DISCUSS/DSCN MKR DOCD: CPT | Performed by: SURGERY

## 2019-01-02 PROCEDURE — 4040F PNEUMOC VAC/ADMIN/RCVD: CPT | Performed by: FAMILY MEDICINE

## 2019-01-02 PROCEDURE — 3017F COLORECTAL CA SCREEN DOC REV: CPT | Performed by: FAMILY MEDICINE

## 2019-01-02 PROCEDURE — G8400 PT W/DXA NO RESULTS DOC: HCPCS | Performed by: FAMILY MEDICINE

## 2019-01-02 PROCEDURE — G8427 DOCREV CUR MEDS BY ELIG CLIN: HCPCS | Performed by: FAMILY MEDICINE

## 2019-01-02 PROCEDURE — 99213 OFFICE O/P EST LOW 20 MIN: CPT | Performed by: SURGERY

## 2019-01-02 PROCEDURE — 4040F PNEUMOC VAC/ADMIN/RCVD: CPT | Performed by: SURGERY

## 2019-01-02 PROCEDURE — G8427 DOCREV CUR MEDS BY ELIG CLIN: HCPCS | Performed by: SURGERY

## 2019-01-02 RX ORDER — HYDROCODONE BITARTRATE AND ACETAMINOPHEN 5; 325 MG/1; MG/1
1 TABLET ORAL EVERY 6 HOURS PRN
Qty: 25 TABLET | Refills: 0 | Status: SHIPPED | OUTPATIENT
Start: 2019-01-02 | End: 2019-01-09

## 2019-01-02 ASSESSMENT — ENCOUNTER SYMPTOMS
COLOR CHANGE: 0
CHEST TIGHTNESS: 0
NAUSEA: 0
RHINORRHEA: 0
RECTAL PAIN: 0
ABDOMINAL DISTENTION: 0
ALLERGIC/IMMUNOLOGIC NEGATIVE: 1
BLOOD IN STOOL: 0
SHORTNESS OF BREATH: 0
ABDOMINAL PAIN: 1

## 2019-01-04 ENCOUNTER — HOSPITAL ENCOUNTER (OUTPATIENT)
Dept: CT IMAGING | Age: 69
Discharge: HOME | End: 2019-01-06
Payer: MEDICARE

## 2019-01-04 VITALS
RESPIRATION RATE: 16 BRPM | BODY MASS INDEX: 34.55 KG/M2 | SYSTOLIC BLOOD PRESSURE: 100 MMHG | WEIGHT: 176 LBS | HEIGHT: 60 IN | HEART RATE: 99 BPM | DIASTOLIC BLOOD PRESSURE: 76 MMHG

## 2019-01-04 DIAGNOSIS — N82.4 COLOVAGINAL FISTULA: ICD-10-CM

## 2019-01-04 DIAGNOSIS — N32.1 COLO-VESICAL FISTULA: ICD-10-CM

## 2019-01-04 DIAGNOSIS — R10.32 LEFT LOWER QUADRANT PAIN: ICD-10-CM

## 2019-01-04 DIAGNOSIS — K57.32 SIGMOID DIVERTICULITIS: ICD-10-CM

## 2019-01-04 PROCEDURE — 6360000004 HC RX CONTRAST MEDICATION: Performed by: FAMILY MEDICINE

## 2019-01-04 PROCEDURE — 2500000003 HC RX 250 WO HCPCS: Performed by: FAMILY MEDICINE

## 2019-01-04 PROCEDURE — 74177 CT ABD & PELVIS W/CONTRAST: CPT

## 2019-01-04 RX ADMIN — BARIUM SULFATE 450 ML: 20 SUSPENSION ORAL at 11:00

## 2019-01-04 RX ADMIN — IOPAMIDOL 100 ML: 612 INJECTION, SOLUTION INTRAVENOUS at 11:00

## 2019-01-07 ENCOUNTER — NURSE ONLY (OUTPATIENT)
Dept: FAMILY MEDICINE CLINIC | Age: 69
End: 2019-01-07
Payer: MEDICARE

## 2019-01-07 DIAGNOSIS — E53.8 B12 DEFICIENCY: Primary | ICD-10-CM

## 2019-01-07 PROCEDURE — 96372 THER/PROPH/DIAG INJ SC/IM: CPT | Performed by: FAMILY MEDICINE

## 2019-01-07 RX ORDER — CYANOCOBALAMIN 1000 UG/ML
1000 INJECTION INTRAMUSCULAR; SUBCUTANEOUS ONCE
Status: COMPLETED | OUTPATIENT
Start: 2019-01-07 | End: 2019-01-07

## 2019-01-07 RX ADMIN — CYANOCOBALAMIN 1000 MCG: 1000 INJECTION INTRAMUSCULAR; SUBCUTANEOUS at 14:57

## 2019-01-09 ENCOUNTER — HOSPITAL ENCOUNTER (OUTPATIENT)
Dept: INFUSION THERAPY | Age: 69
Setting detail: INFUSION SERIES
Discharge: HOME | End: 2019-01-09
Payer: MEDICARE

## 2019-01-09 PROCEDURE — 99213 OFFICE O/P EST LOW 20 MIN: CPT

## 2019-01-09 NOTE — PROGRESS NOTES
Wound Ostomy Continence Nurse                                                                                Ostomy Referral Progress Note      NAME:  Jason Spivey  MEDICAL RECORD NUMBER:  [id number]  AGE: 68 y.o. GENDER:  female  :  1950  TODAY'S DATE:  2019    Subjective     Jason Spivey is a 68 y.o. female referred by:   [x] Physician: Dr. Landon Ayala  [] Nursing  [] Other:      Colostomy and Established    PAST MEDICAL HISTORY:        Diagnosis Date    Anxiety     Cancer [facility])     Chest pain in adult 2016    Heart palpitations 2016    Hypothyroidism     meds > 5 yrs    Other chronic cystitis with hematuria     SOB (shortness of breath) 2016       MEDICATIONS:    Current Outpatient Prescriptions on File Prior to Encounter   Medication Sig Dispense Refill    HYDROcodone-acetaminophen (NORCO) 5-325 MG per tablet Take 1 tablet by mouth every 6 hours as needed for Pain for up to 7 days. . 25 tablet 0    amoxicillin-clavulanate (AUGMENTIN) 875-125 MG per tablet Take 1 tablet by mouth 2 times daily for 14 days 28 tablet 1    levothyroxine (SYNTHROID) 125 MCG tablet Take 1 tablet by mouth daily 90 tablet 3     No current facility-administered medications on file prior to encounter. ALLERGIES:    No Known Allergies    PAST SURGICAL HISTORY:    Past Surgical History:   Procedure Laterality Date    COLONOSCOPY      (-)    COLONOSCOPY  14    DR. BLACK-polyp    [place]OSCOPY      [place]    GALLBLADDER SURGERY      HYSTERECTOMY      MOHS SURGERY Left 2017    Ap Bowman MD [place]    [place] Catheter, ureteral Bilateral 2018    CYSTOSCOPY BILATERAL RETROGRADE PYELOGRAM BILATERAL URETERAL STENT PLACEMENT performed by Cleavon Nageotte, MD at [address] [facility] COLON/ILEOSTOMY N/A 2018    SIGMOID RESECTION WITH END SIGMOID COLOSTOMY performed by Regina Cleaning MD at [address] [place] [address] Rd:    family history includes Cancer in her mother; Colon Cancer in her brother. SOCIAL HISTORY:    Social History   Substance Use Topics    Smoking status: Never Smoker    Smokeless tobacco: Never Used    Alcohol use No       LABS:  WBC:    Lab Results   Component Value Date    WBC 4.2 12/27/2018     H/H:    Lab Results   Component Value Date    HGB 13.3 12/27/2018    HCT 40.7 12/27/2018     BMP:    Lab Results   Component Value Date     12/27/2018    K 4.5 12/27/2018    K 4.1 09/26/2018     12/27/2018    CO2 24 12/27/2018    BUN 14 12/27/2018    LABALBU 3.9 12/27/2018    LABALBU 4.1 08/08/2018    CREATININE 0.43 12/27/2018    CALCIUM 9.1 12/27/2018    GFRAA >60.0 12/27/2018    LABGLOM >60.0 12/27/2018    GLUCOSE 100 12/27/2018    GLUCOSE 100 08/08/2018     PTT:    Lab Results   Component Value Date    APTT 30.3 04/11/2016   [APTT}  PT/INR:    Lab Results   Component Value Date    PROTIME 12.0 08/08/2018    INR 1.08 08/08/2018       Objective    LMP  (LMP Unknown)     Elton Risk Score      Assessment   Surgeon - Dr. Laura Yancey    Colostomy      Colostomy (Active)   Stomal Appliance 1 piece;Clean;Dry; Intact 1/9/2019  8:30 AM   Flange Size (inches) 1.25 Inches 1/9/2019  8:30 AM   Stoma  Assessment Red;Moist;Protrudes 1/9/2019  8:30 AM   Mucocutaneous Junction Intact 1/9/2019  8:30 AM   Peristomal Assessment Other (Comment) 1/9/2019  8:30 AM   Treatment Bag change;Site care;Stoma powder 1/9/2019  8:30 AM   Stool Appearance Soft 1/9/2019  8:30 AM   Stool Color Brown 1/9/2019  8:30 AM   Stool Amount Medium 1/9/2019  8:30 AM   Number of days: 105     This [facility] Nurse received referral from Dr. Laura Yancey to evaluate a linnea stomal wound. Patient was seen by Dr. Laura Yancey last week for this same wound, Silver nitrate was used to the area. Patient called the covering Wound/Ostomy nurse, who suggested stoma powder and clear transparent film to help with wound care and pouching difficulty (having difficulty keeping a pouch on). Patient called this [id number] [facility] Nurse Monday and stated the wound was bleeding excessively and that the linnea stomal skin all around the stoma was red, blistering, and painful. Patient states she has to change the pouch every other day because of all the blood that is coming from the wound. Patient has had the current appliance on since Monday morning and appears to be adhering well. Appliance removed using adhesive remover - no signs that stool was undermining the flange, the appliance (Coloplast one-piece SenSura [place] convex) is a good fit. This excessive bleeding that the patient was talking about was not present at this time. Patient sarcastically states \"of course, every time you're involved the problem goes away. \" The patient's stoma is in the LLQ of the abdomen. Stoma is is red, moist, and protrudes. Mucocutaneous junction is intact. Linnea stomal wound present at 3 o'clock - clean, red, superficial, moist, but no drainage, with signs of healing, measures approximately 0.8x1.8 cm with no depth present. New pink skin noted to the area surround the wound. Otherwise, the linnea stomal skin is clean, dry, and intact. Will continue with stoma powder and the clear transparent film to the wound prior to pouching, since this seems to be helping the wound. All linnea stomal skin cleansed and dried. Stoma powder applied to the linnea stomal wound and excess powder dusted off. Use of skin protectant wipe next, applied to all intact linnea stomal skin. Small piece of clear transparent film cut-to-fit, then applied to the linnea stomal wound at 3 o'clock. Last, patient's one-piece appliance applied. There was no bleeding at this time, and patient did not verbalize or appear to be in pain. Please see clinical photo of linnea stomal wound, found under the media tab and noted below. Throughout the entire appointment today, patient complained about almost everything.  This [id number] [facility] Nurse verbalized being glad the wound is healing and patient isn't having pain at this time - patient complained that this should have never happened in the first place. [id number]  Nurse explained to the patient the wound is healing, signs of new skin growth apparent. Patient just wants this reverse as soon as possible, even verbalizes \"Dr. Gill Romero is being dramatic about doing this too soon, everyone I talk to say I could have had it done already. I should have just went to the [facility]. \" [id number]  Nurse explained to patient that Dr. Gill Romero has to be certain that everything is healed inside before he can put her back together orther wise she is at high risk for complications and possibly ending up with a diverting ostomy again post-operatively. Patient responded with Tessa Wilkinson well I think he is being dramatic, just like post-op he kept my glover in longer than 3 days. \" Patient then immediately started talking about getting a second opinion and that Dr. Tova Galeas is going to authorize this for her. This [address]  Nurse told patient she is entitled to a second opinion if she chooses. Patient then provided extra supplies. Patient has an appointment with Dr. Gill Romero tomorrow. Overall, stoma is healthy and functioning. Shaunna stomal wound is healing. Patient just really does not want to have the ostomy anymore.        No intake or output data in the 24 hours ending 01/09/19 0909    DATE OF LAST BM= Colostomy is functioning    Plan   Plan for Ostomy Care:        Ostomy Plan of Care  [x] Supplies/Instructions left in room  [] Patient using home supplies  [x] Brand/supplies at bedside - Coloplast, SenSura [place] convex system    Current Diet:   Dietician consult:  N/A    Discharge Plan:  Placement for patient upon discharge: home with support    Outpatient visit plan No  Supplies given Yes   Samples requested N/A    Referrals:  []   [] [facility]  [] Supplies  [] Other    Patient/Caregiver Teaching:  Written Instructions given to patient/family: Patient  Teaching provided:  [] Reviewed GI and A&P        [] Supplies  [] Pouch emptying      [] Manipulate closure  [] Routine Care         [x] Shaunna stomal skin conditions; wound care  [] Pouch maintenance           Level of patient/caregiver understanding able to:  [] Indicates understanding       [] Needs reinforcement  [] Unsuccessful      [x] Verbal Understanding  [x] Demonstrated understanding       [] No evidence of learning  [] Refused teaching         [] N/A    Electronically signed by SHAYAN GreenfieldN, RN, CWOCN on 1/9/2019 at 1000

## 2019-01-10 ENCOUNTER — OFFICE VISIT (OUTPATIENT)
Dept: SURGERY | Age: 69
End: 2019-01-10
Payer: MEDICARE

## 2019-01-10 VITALS
TEMPERATURE: 97.5 F | SYSTOLIC BLOOD PRESSURE: 150 MMHG | WEIGHT: 177 LBS | BODY MASS INDEX: 34.75 KG/M2 | HEIGHT: 60 IN | DIASTOLIC BLOOD PRESSURE: 92 MMHG

## 2019-01-10 DIAGNOSIS — K57.32 SIGMOID DIVERTICULITIS: ICD-10-CM

## 2019-01-10 DIAGNOSIS — N32.1 COLO-VESICAL FISTULA: ICD-10-CM

## 2019-01-10 DIAGNOSIS — N82.4 COLOVAGINAL FISTULA: Primary | ICD-10-CM

## 2019-01-10 PROCEDURE — 1036F TOBACCO NON-USER: CPT | Performed by: SURGERY

## 2019-01-10 PROCEDURE — G8400 PT W/DXA NO RESULTS DOC: HCPCS | Performed by: SURGERY

## 2019-01-10 PROCEDURE — G8427 DOCREV CUR MEDS BY ELIG CLIN: HCPCS | Performed by: SURGERY

## 2019-01-10 PROCEDURE — 1101F PT FALLS ASSESS-DOCD LE1/YR: CPT | Performed by: SURGERY

## 2019-01-10 PROCEDURE — G8482 FLU IMMUNIZE ORDER/ADMIN: HCPCS | Performed by: SURGERY

## 2019-01-10 PROCEDURE — 1123F ACP DISCUSS/DSCN MKR DOCD: CPT | Performed by: SURGERY

## 2019-01-10 PROCEDURE — 4040F PNEUMOC VAC/ADMIN/RCVD: CPT | Performed by: SURGERY

## 2019-01-10 PROCEDURE — 1090F PRES/ABSN URINE INCON ASSESS: CPT | Performed by: SURGERY

## 2019-01-10 PROCEDURE — 3017F COLORECTAL CA SCREEN DOC REV: CPT | Performed by: SURGERY

## 2019-01-10 PROCEDURE — G8417 CALC BMI ABV UP PARAM F/U: HCPCS | Performed by: SURGERY

## 2019-01-10 PROCEDURE — 99213 OFFICE O/P EST LOW 20 MIN: CPT | Performed by: SURGERY

## 2019-01-10 RX ORDER — NYSTATIN 100000 [USP'U]/G
POWDER TOPICAL
Qty: 15 G | Refills: 1 | Status: SHIPPED | OUTPATIENT
Start: 2019-01-10 | End: 2021-03-23

## 2019-01-10 ASSESSMENT — ENCOUNTER SYMPTOMS
SHORTNESS OF BREATH: 0
RECTAL PAIN: 0
BLOOD IN STOOL: 0
ABDOMINAL PAIN: 1
CHEST TIGHTNESS: 0
NAUSEA: 0
RHINORRHEA: 0
ABDOMINAL DISTENTION: 0
COLOR CHANGE: 0
ALLERGIC/IMMUNOLOGIC NEGATIVE: 1

## 2019-01-15 ENCOUNTER — PREP FOR PROCEDURE (OUTPATIENT)
Dept: SURGERY | Age: 69
End: 2019-01-15

## 2019-01-15 ENCOUNTER — HOSPITAL ENCOUNTER (OUTPATIENT)
Age: 69
Setting detail: OUTPATIENT SURGERY
Discharge: HOME | End: 2019-01-15
Attending: SURGERY | Admitting: SURGERY
Payer: MEDICARE

## 2019-01-15 VITALS
HEIGHT: 60 IN | BODY MASS INDEX: 33.38 KG/M2 | SYSTOLIC BLOOD PRESSURE: 123 MMHG | DIASTOLIC BLOOD PRESSURE: 59 MMHG | OXYGEN SATURATION: 98 % | TEMPERATURE: 98.6 F | HEART RATE: 80 BPM | RESPIRATION RATE: 18 BRPM | WEIGHT: 170 LBS

## 2019-01-15 LAB
EKG ATRIAL RATE: 75 BPM
EKG P AXIS: 43 DEGREES
EKG P-R INTERVAL: 130 MS
EKG Q-T INTERVAL: 384 MS
EKG QRS DURATION: 86 MS
EKG QTC CALCULATION (BAZETT): 428 MS
EKG R AXIS: 20 DEGREES
EKG T AXIS: 69 DEGREES
EKG VENTRICULAR RATE: 75 BPM

## 2019-01-15 PROCEDURE — 2709999900 HC NON-CHARGEABLE SUPPLY: Performed by: SURGERY

## 2019-01-15 PROCEDURE — 45330 DIAGNOSTIC SIGMOIDOSCOPY: CPT | Performed by: SURGERY

## 2019-01-15 PROCEDURE — 93005 ELECTROCARDIOGRAM TRACING: CPT

## 2019-01-15 PROCEDURE — 3600000013 HC SURGERY LEVEL 3 ADDTL 15MIN: Performed by: SURGERY

## 2019-01-15 PROCEDURE — 3600000003 HC SURGERY LEVEL 3 BASE: Performed by: SURGERY

## 2019-01-15 ASSESSMENT — PAIN - FUNCTIONAL ASSESSMENT: PAIN_FUNCTIONAL_ASSESSMENT: 0-10

## 2019-01-16 PROCEDURE — 93010 ELECTROCARDIOGRAM REPORT: CPT | Performed by: INTERNAL MEDICINE

## 2019-01-18 ENCOUNTER — TELEPHONE (OUTPATIENT)
Dept: SURGERY | Age: 69
End: 2019-01-18

## 2019-02-08 ENCOUNTER — NURSE ONLY (OUTPATIENT)
Dept: FAMILY MEDICINE CLINIC | Age: 69
End: 2019-02-08
Payer: MEDICARE

## 2019-02-08 DIAGNOSIS — E53.8 B12 DEFICIENCY: Primary | ICD-10-CM

## 2019-02-08 PROCEDURE — 96372 THER/PROPH/DIAG INJ SC/IM: CPT | Performed by: FAMILY MEDICINE

## 2019-02-08 RX ORDER — CYANOCOBALAMIN 1000 UG/ML
1000 INJECTION INTRAMUSCULAR; SUBCUTANEOUS ONCE
Status: COMPLETED | OUTPATIENT
Start: 2019-02-08 | End: 2019-02-08

## 2019-02-08 RX ADMIN — CYANOCOBALAMIN 1000 MCG: 1000 INJECTION INTRAMUSCULAR; SUBCUTANEOUS at 09:10

## 2019-02-15 DIAGNOSIS — F41.9 ANXIETY: ICD-10-CM

## 2019-02-15 RX ORDER — ALPRAZOLAM 0.5 MG/1
TABLET ORAL
Qty: 30 TABLET | Refills: 1 | Status: SHIPPED | OUTPATIENT
Start: 2019-02-15 | End: 2019-03-17

## 2019-02-25 ENCOUNTER — TELEPHONE (OUTPATIENT)
Dept: FAMILY MEDICINE CLINIC | Age: 69
End: 2019-02-25

## 2019-02-26 ENCOUNTER — TELEPHONE (OUTPATIENT)
Dept: FAMILY MEDICINE CLINIC | Age: 69
End: 2019-02-26

## 2019-03-08 ENCOUNTER — TELEPHONE (OUTPATIENT)
Dept: FAMILY MEDICINE CLINIC | Age: 69
End: 2019-03-08

## 2019-03-13 ENCOUNTER — OFFICE VISIT (OUTPATIENT)
Dept: FAMILY MEDICINE CLINIC | Age: 69
End: 2019-03-13
Payer: MEDICARE

## 2019-03-13 VITALS
TEMPERATURE: 98.9 F | SYSTOLIC BLOOD PRESSURE: 112 MMHG | DIASTOLIC BLOOD PRESSURE: 64 MMHG | HEART RATE: 80 BPM | RESPIRATION RATE: 16 BRPM | HEIGHT: 60 IN | WEIGHT: 170 LBS | BODY MASS INDEX: 33.38 KG/M2

## 2019-03-13 DIAGNOSIS — Z98.890 HISTORY OF COLOSTOMY REVERSAL: ICD-10-CM

## 2019-03-13 DIAGNOSIS — E53.8 B12 DEFICIENCY: ICD-10-CM

## 2019-03-13 DIAGNOSIS — K57.90 DIVERTICULOSIS OF INTESTINE WITHOUT BLEEDING, UNSPECIFIED INTESTINAL TRACT LOCATION: ICD-10-CM

## 2019-03-13 DIAGNOSIS — E87.6 HYPOKALEMIA: ICD-10-CM

## 2019-03-13 DIAGNOSIS — D64.9 ANEMIA, UNSPECIFIED TYPE: ICD-10-CM

## 2019-03-13 DIAGNOSIS — Z12.31 ENCOUNTER FOR SCREENING MAMMOGRAM FOR BREAST CANCER: ICD-10-CM

## 2019-03-13 DIAGNOSIS — E03.9 HYPOTHYROIDISM, UNSPECIFIED TYPE: Primary | ICD-10-CM

## 2019-03-13 PROBLEM — K57.92 DIVERTICULITIS: Status: ACTIVE | Noted: 2019-01-24

## 2019-03-13 PROCEDURE — 3017F COLORECTAL CA SCREEN DOC REV: CPT | Performed by: FAMILY MEDICINE

## 2019-03-13 PROCEDURE — 1090F PRES/ABSN URINE INCON ASSESS: CPT | Performed by: FAMILY MEDICINE

## 2019-03-13 PROCEDURE — 1123F ACP DISCUSS/DSCN MKR DOCD: CPT | Performed by: FAMILY MEDICINE

## 2019-03-13 PROCEDURE — G8400 PT W/DXA NO RESULTS DOC: HCPCS | Performed by: FAMILY MEDICINE

## 2019-03-13 PROCEDURE — 99213 OFFICE O/P EST LOW 20 MIN: CPT | Performed by: FAMILY MEDICINE

## 2019-03-13 PROCEDURE — G8417 CALC BMI ABV UP PARAM F/U: HCPCS | Performed by: FAMILY MEDICINE

## 2019-03-13 PROCEDURE — G8482 FLU IMMUNIZE ORDER/ADMIN: HCPCS | Performed by: FAMILY MEDICINE

## 2019-03-13 PROCEDURE — 96372 THER/PROPH/DIAG INJ SC/IM: CPT | Performed by: FAMILY MEDICINE

## 2019-03-13 PROCEDURE — G8427 DOCREV CUR MEDS BY ELIG CLIN: HCPCS | Performed by: FAMILY MEDICINE

## 2019-03-13 PROCEDURE — 4040F PNEUMOC VAC/ADMIN/RCVD: CPT | Performed by: FAMILY MEDICINE

## 2019-03-13 PROCEDURE — 1036F TOBACCO NON-USER: CPT | Performed by: FAMILY MEDICINE

## 2019-03-13 PROCEDURE — 1101F PT FALLS ASSESS-DOCD LE1/YR: CPT | Performed by: FAMILY MEDICINE

## 2019-03-13 RX ORDER — CYANOCOBALAMIN 1000 UG/ML
1000 INJECTION INTRAMUSCULAR; SUBCUTANEOUS ONCE
Status: COMPLETED | OUTPATIENT
Start: 2019-03-13 | End: 2019-03-13

## 2019-03-13 RX ADMIN — CYANOCOBALAMIN 1000 MCG: 1000 INJECTION INTRAMUSCULAR; SUBCUTANEOUS at 10:25

## 2020-11-16 ENCOUNTER — TELEPHONE (OUTPATIENT)
Dept: FAMILY MEDICINE CLINIC | Age: 70
End: 2020-11-16

## 2020-11-16 NOTE — TELEPHONE ENCOUNTER
No longer has a [facility] PCP.  [facility]

## 2021-03-23 ENCOUNTER — OFFICE VISIT (OUTPATIENT)
Dept: UROLOGY | Age: 71
End: 2021-03-23
Payer: MEDICARE

## 2021-03-23 VITALS
WEIGHT: 180 LBS | HEIGHT: 60 IN | DIASTOLIC BLOOD PRESSURE: 80 MMHG | HEART RATE: 76 BPM | BODY MASS INDEX: 35.34 KG/M2 | SYSTOLIC BLOOD PRESSURE: 132 MMHG

## 2021-03-23 DIAGNOSIS — N39.0 RECURRENT UTI: Primary | ICD-10-CM

## 2021-03-23 LAB
BILIRUBIN, POC: ABNORMAL
BLOOD URINE, POC: ABNORMAL
CLARITY, POC: CLEAR
COLOR, POC: YELLOW
GLUCOSE URINE, POC: ABNORMAL
KETONES, POC: ABNORMAL
LEUKOCYTE EST, POC: ABNORMAL
NITRITE, POC: ABNORMAL
PH, POC: 6
PROTEIN, POC: ABNORMAL
SPECIFIC GRAVITY, POC: 1.01
UROBILINOGEN, POC: 0.2

## 2021-03-23 PROCEDURE — G8427 DOCREV CUR MEDS BY ELIG CLIN: HCPCS | Performed by: UROLOGY

## 2021-03-23 PROCEDURE — 1123F ACP DISCUSS/DSCN MKR DOCD: CPT | Performed by: UROLOGY

## 2021-03-23 PROCEDURE — 4040F PNEUMOC VAC/ADMIN/RCVD: CPT | Performed by: UROLOGY

## 2021-03-23 PROCEDURE — G8417 CALC BMI ABV UP PARAM F/U: HCPCS | Performed by: UROLOGY

## 2021-03-23 PROCEDURE — 1036F TOBACCO NON-USER: CPT | Performed by: UROLOGY

## 2021-03-23 PROCEDURE — G8400 PT W/DXA NO RESULTS DOC: HCPCS | Performed by: UROLOGY

## 2021-03-23 PROCEDURE — 99214 OFFICE O/P EST MOD 30 MIN: CPT | Performed by: UROLOGY

## 2021-03-23 PROCEDURE — G8484 FLU IMMUNIZE NO ADMIN: HCPCS | Performed by: UROLOGY

## 2021-03-23 PROCEDURE — 81003 URINALYSIS AUTO W/O SCOPE: CPT | Performed by: UROLOGY

## 2021-03-23 PROCEDURE — 3017F COLORECTAL CA SCREEN DOC REV: CPT | Performed by: UROLOGY

## 2021-03-23 PROCEDURE — 1090F PRES/ABSN URINE INCON ASSESS: CPT | Performed by: UROLOGY

## 2021-03-23 RX ORDER — MELOXICAM 7.5 MG/1
TABLET ORAL
COMMUNITY
Start: 2021-02-01 | End: 2022-07-15 | Stop reason: ALTCHOICE

## 2021-03-23 RX ORDER — NITROFURANTOIN 25; 75 MG/1; MG/1
100 CAPSULE ORAL 2 TIMES DAILY
Qty: 20 CAPSULE | Refills: 1 | Status: SHIPPED | OUTPATIENT
Start: 2021-03-23 | End: 2021-04-23

## 2021-03-23 ASSESSMENT — ENCOUNTER SYMPTOMS
ABDOMINAL PAIN: 0
ABDOMINAL DISTENTION: 0

## 2021-03-23 NOTE — PROGRESS NOTES
Subjective:      Patient ID: Vannessa Wan is a 66 y.o. female. HPI  This is a 65 yo female with HTN, Hypothyroidism and back with recurrent UTI's and hematuria with colovesical fistula now s/p colectomy and colostomy by Dr Romana Palacio on 9/25/18. She had cystoscopy and bilateral ureteral stents at the time of her colectomy and repeat cystoscopy in office when last seen in 2018 showing improved bladder appearance. She then had colostomy reversal at [facility] and has been doing well until the last 4-5 months has had 2-3 UTI's treated by her PCP at [facility]. She gets dysuria and some frequency and mild hematuria with these UTI's and just took some left over Macrobid for about 3-4 days and still has some mild dysuria. She has no abd or flank pain. She has no F/C or N/V. She has no interval diverticulitis or pneumaturia. She has no other complaints. Past Medical History:   Diagnosis Date    Anxiety     Cancer [facility])     Chest pain in adult 4/23/2016    Heart palpitations 4/28/2016    Hypothyroidism     meds > 5 yrs    Other chronic cystitis with hematuria     SOB (shortness of breath) 4/23/2016     Past Surgical History:   Procedure Laterality Date    COLONOSCOPY  2007    (-)    COLONOSCOPY  06/20/14    DR. BLACK-polyp    [place]  [place]    GALLBLADDER SURGERY      HYSTERECTOMY      MOHS SURGERY Left 06/22/2017    Renard Olivares MD [place]    [place] Catheter, ureteral Bilateral 9/25/2018    CYSTOSCOPY BILATERAL RETROGRADE PYELOGRAM BILATERAL URETERAL STENT PLACEMENT performed by Ash Sebastian MD at [address] [facility] N/A 9/25/2018    SIGMOID RESECTION WITH END SIGMOID COLOSTOMY performed by Anju Doss MD at [address] N/A 1/15/2019    FLEXIBLE SIGMOIDOSCOPY performed by Anju Doss MD at [address] [facility]  03/2019    [facility]     Social History     Socioeconomic History    Marital status:       Spouse name: None    Number of children: None    Years of education: None    Highest education level: None   Occupational History    None   Social Needs    Financial resource strain: None    Food insecurity     Worry: None     Inability: None    Transportation needs     Medical: None     Non-medical: None   Tobacco Use    Smoking status: Never Smoker    Smokeless tobacco: Never Used   Substance and Sexual Activity    Alcohol use: No    Drug use: No    Sexual activity: Not Currently   Lifestyle    Physical activity     Days per week: None     Minutes per session: None    Stress: None   Relationships    Social connections     Talks on phone: None     Gets together: None     Attends Sabianism service: None     Active member of club or organization: None     Attends meetings of clubs or organizations: None     Relationship status: None    Intimate partner violence     Fear of current or ex partner: None     Emotionally abused: None     Physically abused: None     Forced sexual activity: None   Other Topics Concern    None   Social History Narrative    None     Family History   Problem Relation Age of Onset    Cancer Mother     Colon Cancer Brother      Current Outpatient Medications   Medication Sig Dispense Refill    meloxicam (MOBIC) 7.5 MG tablet TAKE 1 TABLET BY MOUTH DAILY WITH FOOD      levothyroxine (SYNTHROID) 125 MCG tablet Take 1 tablet by mouth daily 90 tablet 3     No current facility-administered medications for this visit. Patient has no known allergies. reviewed    Review of Systems   Constitutional: Negative for fever and unexpected weight change. Gastrointestinal: Negative for abdominal distention and abdominal pain. Genitourinary: Positive for dysuria. Negative for flank pain and vaginal discharge. Objective:   Physical Exam  Constitutional:       Appearance: Normal appearance. Abdominal:      General: There is no distension. Palpations: Abdomen is soft. There is no mass. Tenderness:  There is no abdominal tenderness. There is no guarding or rebound. Hernia: No hernia is present. Neurological:      Mental Status: She is alert. Psychiatric:         Mood and Affect: Mood normal.         Behavior: Behavior normal.         3/23/2021 10:46 AM - Vanessa Westbrook, CMA    Component Collected Lab   Color, UA 03/23/2021 10:45 AM Unknown   yellow    Clarity, UA 03/23/2021 10:45 AM Unknown   clear    Glucose, UA POC 03/23/2021 10:45 AM Unknown   neg    Bilirubin, UA 03/23/2021 10:45 AM Unknown   neg    Ketones, UA 03/23/2021 10:45 AM Unknown   neg    Spec Grav, UA 03/23/2021 10:45 AM Unknown   1.015    Blood, UA POC 03/23/2021 10:45 AM Unknown   moderate    pH, UA 03/23/2021 10:45 AM Unknown   6.0    Protein, UA POC 03/23/2021 10:45 AM Unknown   neg    Urobilinogen, UA 03/23/2021 10:45 AM Unknown   0.2    Leukocytes, UA 03/23/2021 10:45 AM Unknown   neg    Nitrite, UA 03/23/2021 10:45 AM Unknown   neg        Assessment: This is a 65 yo female with HTN, Hypothyroidism and prior h/o UTI's related to colovesical fistula s/p repair but ha snow had 3 UTI's treated since 11/20. She has some mild current residual dysuria since taking 4 days of Macrobid and I recommend she finish a full 7 days. I also recommend a repeat evaluation given her prior history and she agrees. Will obtain a CT Urogram and then cystoscopy for full evaluation. The risks and benefits of cystoscopy including but not limited to infection, pain, bleeding, stricture, retention, perforation, need for multiple procedures and she wants to proceed as planned. Plan:      1. CT Urogram  2. F/U 1-2 weeks for office cystoscopy, local  3.    Orders Placed This Encounter   Medications    nitrofurantoin, macrocrystal-monohydrate, (MACROBID) 100 MG capsule     Sig: Take 1 capsule by mouth 2 times daily     Dispense:  20 capsule     Refill:  1             Mayra Adler MD

## 2021-04-02 ENCOUNTER — HOSPITAL ENCOUNTER (OUTPATIENT)
Dept: CT IMAGING | Age: 71
Discharge: HOME | End: 2021-04-04
Payer: MEDICARE

## 2021-04-02 VITALS — SYSTOLIC BLOOD PRESSURE: 142 MMHG | HEART RATE: 71 BPM | DIASTOLIC BLOOD PRESSURE: 80 MMHG

## 2021-04-02 DIAGNOSIS — N39.0 RECURRENT UTI: ICD-10-CM

## 2021-04-02 PROCEDURE — 6360000004 HC RX CONTRAST MEDICATION: Performed by: UROLOGY

## 2021-04-02 PROCEDURE — 74178 CT ABD&PLV WO CNTR FLWD CNTR: CPT

## 2021-04-02 RX ADMIN — IOPAMIDOL 100 ML: 612 INJECTION, SOLUTION INTRAVENOUS at 08:32

## 2021-04-23 ENCOUNTER — PROCEDURE VISIT (OUTPATIENT)
Dept: UROLOGY | Age: 71
End: 2021-04-23
Payer: MEDICARE

## 2021-04-23 VITALS
HEIGHT: 60 IN | BODY MASS INDEX: 35.14 KG/M2 | HEART RATE: 80 BPM | WEIGHT: 179 LBS | DIASTOLIC BLOOD PRESSURE: 86 MMHG | SYSTOLIC BLOOD PRESSURE: 132 MMHG

## 2021-04-23 DIAGNOSIS — R31.0 GROSS HEMATURIA: ICD-10-CM

## 2021-04-23 DIAGNOSIS — R31.0 GROSS HEMATURIA: Primary | ICD-10-CM

## 2021-04-23 LAB
BILIRUBIN, POC: ABNORMAL
BLOOD URINE, POC: ABNORMAL
CLARITY, POC: CLEAR
COLOR, POC: YELLOW
GLUCOSE URINE, POC: ABNORMAL
KETONES, POC: ABNORMAL
LEUKOCYTE EST, POC: ABNORMAL
NITRITE, POC: ABNORMAL
PH, POC: 6
PROTEIN, POC: ABNORMAL
SPECIFIC GRAVITY, POC: 1.02
UROBILINOGEN, POC: 0.2

## 2021-04-23 PROCEDURE — 52000 CYSTOURETHROSCOPY: CPT | Performed by: UROLOGY

## 2021-04-23 PROCEDURE — 81003 URINALYSIS AUTO W/O SCOPE: CPT | Performed by: UROLOGY

## 2021-04-23 RX ORDER — SULFAMETHOXAZOLE AND TRIMETHOPRIM 800; 160 MG/1; MG/1
1 TABLET ORAL ONCE
Qty: 1 TABLET | Refills: 0 | COMMUNITY
Start: 2021-04-23 | End: 2021-04-23

## 2021-04-23 ASSESSMENT — ENCOUNTER SYMPTOMS
ABDOMINAL DISTENTION: 0
ABDOMINAL PAIN: 0

## 2021-04-23 NOTE — PROGRESS NOTES
Time Out was called before Cystoscopy procedure. Patient is Shani Coleman,  is 1950. Patient has the following allergies:  No Known Allergies. Patient was given Bactrim DS antibiotic before the procedure.

## 2021-04-23 NOTE — PROGRESS NOTES
Subjective:      Patient ID: Tatyana De Guzman is a 66 y.o. female. HPI   This is a 65 yo female with HTN, Hypothyroidism and back with recurrent UTI's and hematuria. She had prior colovesical fistula s/p colectomy and colostomy by Dr Elis Burdick on 9/25/18. She had cystoscopy and bilateral ureteral stents at the time of her colectomy and repeat cystoscopy in office when last seen in 2018 showing improved bladder appearance. She then had colostomy reversal at [facility] and has been doing well until the last 4-5 months has had 2-3 UTI's treated by her PCP at [address] [place] [address]. She gets dysuria and some frequency and mild hematuria with these UTI's. She has been having some intermittent hematuria recently without UTI symptoms. She does not smoke ciggs. I reviewed the CT on PACS and with patient today and she wants to proceed with cystoscopy.      Past Medical History:   Diagnosis Date    Anxiety     Cancer [facility])     Chest pain in adult 4/23/2016    Heart palpitations 4/28/2016    Hypothyroidism     meds > 5 yrs    Other chronic cystitis with hematuria     SOB (shortness of breath) 4/23/2016     Past Surgical History:   Procedure Laterality Date    COLONOSCOPY  2007    (-)    COLONOSCOPY  06/20/14    DR. BLACK-polyp    [place]  [place]    GALLBLADDER SURGERY      HYSTERECTOMY      MOHS SURGERY Left 06/22/2017    Gary Johnson MD [place]    [place] Catheter, ureteral Bilateral 9/25/2018    CYSTOSCOPY BILATERAL RETROGRADE PYELOGRAM BILATERAL URETERAL STENT PLACEMENT performed by Agustin Aquino MD at [address] [place] [address] N/A 9/25/2018    SIGMOID RESECTION WITH END SIGMOID COLOSTOMY performed by Christian Moyer MD at [address] [place] [address] N/A 1/15/2019    FLEXIBLE SIGMOIDOSCOPY performed by Christian Moyer MD at [facility]  03/2019    [facility]     Social History     Socioeconomic History    Marital status:       Spouse name: None    Number of children: None    Years of education: None    Highest education level: None   Occupational History    None   Social Needs    Financial resource strain: None    Food insecurity     Worry: None     Inability: None    Transportation needs     Medical: None     Non-medical: None   Tobacco Use    Smoking status: Never Smoker    Smokeless tobacco: Never Used   Substance and Sexual Activity    Alcohol use: No    Drug use: No    Sexual activity: Not Currently   Lifestyle    Physical activity     Days per week: None     Minutes per session: None    Stress: None   Relationships    Social connections     Talks on phone: None     Gets together: None     Attends Orthodox service: None     Active member of club or organization: None     Attends meetings of clubs or organizations: None     Relationship status: None    Intimate partner violence     Fear of current or ex partner: None     Emotionally abused: None     Physically abused: None     Forced sexual activity: None   Other Topics Concern    None   Social History Narrative    None     Family History   Problem Relation Age of Onset    Cancer Mother     Colon Cancer Brother      Current Outpatient Medications   Medication Sig Dispense Refill    meloxicam (MOBIC) 7.5 MG tablet TAKE 1 TABLET BY MOUTH DAILY WITH FOOD      levothyroxine (SYNTHROID) 125 MCG tablet Take 1 tablet by mouth daily 90 tablet 3     No current facility-administered medications for this visit. Patient has no known allergies. reviewed    Review of Systems   Constitutional: Negative for fever and unexpected weight change. Gastrointestinal: Negative for abdominal distention and abdominal pain. Genitourinary: Positive for hematuria. Negative for dysuria and flank pain. Objective:   Physical Exam  Constitutional:       Appearance: Normal appearance. Abdominal:      General: There is no distension. Palpations: Abdomen is soft. Neurological:      Mental Status: She is alert.    Psychiatric: reconstruction, and/or weight based dosing when appropriate to reduce radiation dose to as low as reasonably achievable.       COMPARISON: 1/4/2019           RESULT:        Kidneys and urinary tract:        Right: There are no renal calculi. There is a 7 mm hypodense exophytic lesion arising from the anterior superior aspect of the kidney, too small to characterize and unchanged. There is a 5 mm hypoechoic lesion arising from the medial cortex of lower    pole, too small to characterize. The opacified calices, renal pelvis and ureter are normal without dilation, filling defect, or stricture.         Left: There are no renal calculi or masses.  The opacified calices, renal pelvis and ureter are normal without dilation, filling defect, or stricture.         Bladder: No filling defect, calculus, focal or diffuse wall thickening. The bladder is not well-distended. There is minimal contrast in the bladder on delayed images. No definite evidence of bladder fistula.           Abdomen and Pelvis:       Liver: No mass or lesion.        Biliary: No bile duct dilation.  Gallbladder has been resected.       Pancreas: No mass or duct dilation.       Spleen: No mass. No splenomegaly.       Adrenals: No mass.       GI tract: No dilation or wall thickening. Appendix is normal. There are sutures in the distal sigmoid colon. No evidence of colonic diverticula. No diverticulitis.        Lymph nodes: No abdominal or pelvic lymphadenopathy.       Mesentery/Peritoneum: No ascites or mass.       Retroperitoneum: No mass.       Vasculature:  The celiac axis and SMA are patent. The portal vein and branches, splenic vein, SMV, and hepatic veins are patent.  No abdominal aortic or iliac artery aneurysm.       Pelvis: No mass, ascites or fluid collection. Status post hysterectomy.       Abdominal wall: Small umbilical hernia containing fat, seen previously. At the site of previous left-sided ostomy there is a small hernia containing fat.  This is located approximate level the umbilicus.       Bones/Soft Tissues: No acute osseous findings. Narrowed L5-S1 disc, unchanged.        Lower thorax: Unremarkable.           Impression       NO SUSPICIOUS RENAL LESIONS. NO HYDRONEPHROSIS OR NEPHROLITHIASIS.        NO SUSPICIOUS FILLING DEFECTS WITHIN THE COLLECTING SYSTEM.       NO BLADDER FISTULA IS EVIDENT.       ==========             Cystoscopy Procedure Note    Pre-operative Diagnosis: Hematuria/UTIs    Post-operative Diagnosis: Same plus benign findings    Surgeon: Adilson Welch     Assistants: Zackery Ruby. KATIE Kwon    Anesthesia: Local anesthesia topical 2% lidocaine gel      Procedure Details   The risks, benefits, complications, treatment options, and expected outcomes were discussed with the patient. The patient concurred with the proposed plan, giving informed consent. Cystoscopy was performed today under local anesthesia, using sterile technique. The patient was placed in the supine position, prepped and draped in the usual sterile fashion. A flexible cystoscope was used to inspect the entire bladder including retroflexion. Findings:   Urethra:normal  Bladder: Normal mucosa without obvious bladder tumors, stones, clots or FB. Area of scarring/indentation along Lt posterior BW where prior fistula was located is noted  Ureteral orifice(s) were normal . Ureteral orifice(s) were in the normal location. Specimens: None                 Complications:  None; patient tolerated the procedure well. Disposition: To home. Condition: stable    Assessment: This is a 65 yo female with HTN, Hypothyroidism and prior h/o UTI's related to colovesical fistula s/p repair and recent 3 UTI's treated since 11/20. She has interval and intermittent gross hematuria without obvious evidence for UTI today. The CT and cystoscopy shows no obvious cause for her recent complaints.  I am unsure as to the etiology for her complaints given the evaluation at this time. Will check cytology and Fish and F/U in few weeks. Plan:      1. FISH and cytology  2. F/U 2-3 weeks  3.  Urine for C/S        Nilay Nielson MD

## 2021-04-25 LAB — URINE CULTURE, ROUTINE: NORMAL

## 2021-04-28 LAB — MAMMOGRAPHY, EXTERNAL: NORMAL

## 2021-05-04 LAB
FIXATIVE USED: NORMAL
Lab: NORMAL
Lab: NORMAL
UROVYSION FISH REPORT: NORMAL
UROVYSION FISH SPECIMEN SOURCE: NORMAL

## 2021-05-21 ENCOUNTER — OFFICE VISIT (OUTPATIENT)
Dept: UROLOGY | Age: 71
End: 2021-05-21
Payer: MEDICARE

## 2021-05-21 VITALS
DIASTOLIC BLOOD PRESSURE: 84 MMHG | BODY MASS INDEX: 34.36 KG/M2 | WEIGHT: 175 LBS | SYSTOLIC BLOOD PRESSURE: 122 MMHG | HEART RATE: 77 BPM | HEIGHT: 60 IN

## 2021-05-21 DIAGNOSIS — N39.0 RECURRENT UTI: ICD-10-CM

## 2021-05-21 DIAGNOSIS — R31.29 MICROSCOPIC HEMATURIA: Primary | ICD-10-CM

## 2021-05-21 LAB
BILIRUBIN, POC: ABNORMAL
BLOOD URINE, POC: ABNORMAL
CLARITY, POC: CLEAR
COLOR, POC: YELLOW
GLUCOSE URINE, POC: ABNORMAL
KETONES, POC: ABNORMAL
LEUKOCYTE EST, POC: ABNORMAL
NITRITE, POC: ABNORMAL
PH, POC: 5.5
PROTEIN, POC: ABNORMAL
SPECIFIC GRAVITY, POC: 1.03
UROBILINOGEN, POC: 0.2

## 2021-05-21 PROCEDURE — 99213 OFFICE O/P EST LOW 20 MIN: CPT | Performed by: UROLOGY

## 2021-05-21 PROCEDURE — G8400 PT W/DXA NO RESULTS DOC: HCPCS | Performed by: UROLOGY

## 2021-05-21 PROCEDURE — 4040F PNEUMOC VAC/ADMIN/RCVD: CPT | Performed by: UROLOGY

## 2021-05-21 PROCEDURE — 1036F TOBACCO NON-USER: CPT | Performed by: UROLOGY

## 2021-05-21 PROCEDURE — 1123F ACP DISCUSS/DSCN MKR DOCD: CPT | Performed by: UROLOGY

## 2021-05-21 PROCEDURE — G8417 CALC BMI ABV UP PARAM F/U: HCPCS | Performed by: UROLOGY

## 2021-05-21 PROCEDURE — G8427 DOCREV CUR MEDS BY ELIG CLIN: HCPCS | Performed by: UROLOGY

## 2021-05-21 PROCEDURE — 1090F PRES/ABSN URINE INCON ASSESS: CPT | Performed by: UROLOGY

## 2021-05-21 PROCEDURE — 3017F COLORECTAL CA SCREEN DOC REV: CPT | Performed by: UROLOGY

## 2021-05-21 PROCEDURE — 81003 URINALYSIS AUTO W/O SCOPE: CPT | Performed by: UROLOGY

## 2021-05-21 ASSESSMENT — ENCOUNTER SYMPTOMS: ABDOMINAL PAIN: 0

## 2021-05-21 NOTE — PROGRESS NOTES
Subjective:      Patient ID: Timo Katz is a 66 y.o. female. HPI  This is a 68 yo female with HTN, Hypothyroidism and back with recurrent UTI's and hematuria. She had prior colovesical fistula s/p colectomy and colostomy by Dr Laura Yancey on 9/25/18. She had cystoscopy and bilateral ureteral stents at the time of her colectomy and repeat cystoscopy in office when last seen in 2018 showing improved bladder appearance and then recent cystoscopy on 4/23/21 showed no obvious abnl except scar in area of prior fistula. She then had colostomy reversal at [facility] and has been doing well until the last 4-5 months has had 2-3 UTI's treated by her PCP at [facility]. Since last seen on 4/23/21, she has no interval gross hematuria or pain or dysuria. She does not smoke ciggs. I reviewed the interval cytology and FISH results with the patient today. Past Medical History:   Diagnosis Date    Anxiety     Cancer [facility])     Chest pain in adult 4/23/2016    Heart palpitations 4/28/2016    Hypothyroidism     meds > 5 yrs    Other chronic cystitis with hematuria     SOB (shortness of breath) 4/23/2016     Past Surgical History:   Procedure Laterality Date    COLONOSCOPY  2007    (-)    COLONOSCOPY  06/20/14    DR. BLACK-polyp    [place]STOSCOPY  2012    [place]    GALLBLADDER SURGERY      HYSTERECTOMY      MOHS SURGERY Left 06/22/2017    Emanuel Nunez MD [place]    SD Catheter, ureteral Bilateral 9/25/2018    CYSTOSCOPY BILATERAL RETROGRADE PYELOGRAM BILATERAL URETERAL STENT PLACEMENT performed by Tori Orlando MD at [facility] N/A 9/25/2018    SIGMOID RESECTION WITH END SIGMOID COLOSTOMY performed by Twin Correia MD at [address] [facility] N/A 1/15/2019    FLEXIBLE SIGMOIDOSCOPY performed by Twin Correia MD at [address]  03/2019    [facility]     Social History     Socioeconomic History    Marital status:       Spouse name: None    Number of children: None  Years of education: None    Highest education level: None   Occupational History    None   Tobacco Use    Smoking status: Never Smoker    Smokeless tobacco: Never Used   Substance and Sexual Activity    Alcohol use: No    Drug use: No    Sexual activity: Not Currently   Other Topics Concern    None   Social History Narrative    None     Social Determinants of Health     Financial Resource Strain:     Difficulty of Paying Living Expenses:    Food Insecurity:     Worried About Running Out of Food in the Last Year:     Ran Out of Food in the Last Year:    Transportation Needs:     Lack of Transportation (Medical):  Lack of Transportation (Non-Medical):    Physical Activity:     Days of Exercise per Week:     Minutes of Exercise per Session:    Stress:     Feeling of Stress :    Social Connections:     Frequency of Communication with Friends and Family:     Frequency of Social Gatherings with Friends and Family:     Attends Buddhism Services:     Active Member of Clubs or Organizations:     Attends Club or Organization Meetings:     Marital Status:    Intimate Partner Violence:     Fear of Current or Ex-Partner:     Emotionally Abused:     Physically Abused:     Sexually Abused:      Family History   Problem Relation Age of Onset    Cancer Mother     Colon Cancer Brother      Current Outpatient Medications   Medication Sig Dispense Refill    meloxicam (MOBIC) 7.5 MG tablet TAKE 1 TABLET BY MOUTH DAILY WITH FOOD      levothyroxine (SYNTHROID) 125 MCG tablet Take 1 tablet by mouth daily 90 tablet 3     No current facility-administered medications for this visit. Patient has no known allergies. reviewed    Review of Systems   Constitutional: Negative for unexpected weight change. Gastrointestinal: Negative for abdominal pain. Genitourinary: Negative for dysuria, flank pain and hematuria. Objective:   Physical Exam  Constitutional:       Appearance: Normal appearance. Abdominal:      General: There is no distension. Palpations: Abdomen is soft. Neurological:      Mental Status: She is alert. Psychiatric:         Mood and Affect: Mood normal.         5/21/2021  8:38 AM - [facility] ([facility])    Component Collected Lab   Color, UA 05/21/2021  8:37 AM Unknown   yellow    Clarity, UA 05/21/2021  8:37 AM Unknown   clear    Glucose, UA POC 05/21/2021  8:37 AM Unknown   neg    Bilirubin, UA 05/21/2021  8:37 AM Unknown   neg    Ketones, UA 05/21/2021  8:37 AM Unknown   neg    Spec Grav, UA 05/21/2021  8:37 AM Unknown   1.030    Blood, UA POC 05/21/2021  8:37 AM Unknown   large    pH, UA 05/21/2021  8:37 AM Unknown   5.5    Protein, UA POC 05/21/2021  8:37 AM Unknown   neg    Urobilinogen, UA 05/21/2021  8:37 AM Unknown   0.2    Leukocytes, UA 05/21/2021  8:37 AM Unknown   neg    Nitrite, UA 05/21/2021  8:37 AM Unknown   neg    Lab and Collection    POCT Urinalysis No Micro (Auto) - 5/21/2021 5/4/2021 10:20 AM - [facility] Incoming Lab Results From Soft    Component Collected Lab   Urovysion Fish Report 04/23/2021  3:42 PM [facility]   SEE BELOW    Comment:                            UroVysion FISH Final Report   ------------------------------------------------------------------------   ------   UroVysion FISH Specimen Source   ------------------------------   Urine     Fixative   --------   PreservCyt     Gross Description   -----------------   Received 85 mls fluid in PreservCyt. Interpretation   --------------   Negative: No evidence of numeric chromosomal aberrations   associated with urothelial carcinoma identified. Negative results indicate a lack of evidence for the   presence of numeric chromosomal abnormalities commonly   associated with urothelial carcinoma, within the cells   collected in this specimen.  Negative results in the   presence of other symptoms/signs of recurrent urothelial   carcinoma may suggest the possibility of a false-negative   test. In this circumstance, additional clinical studies to   exclude urothelial carcinoma should be pursued, as   clinically indicated. Although the [employer] UroVysion Kit   was designed to detect genetic abnormality associated with   most urothelial cancers, there will be some urothelial   cancers whose genetic changes cannot be detected by the   UroVysion test.     The [employer] UroVysion Bladder Cancer Kit (UroVysion Kit) is   approved for use by the U.S. Food and Drug Administration. The UroVysion Bladder Cancer Kit (UroVysion Kit) is   designed to detect aneuploidy for chromosomes 3, 7, 17,   and loss of the 9p21 locus via fluorescence in situ   hybridization (FISH) in urine specimens from persons with   hematuria suspected of having bladder cancer. Results from   the UroVysion Kit are intended for use, in conjunction with   and not in lieu of current standard diagnostic procedures,   as an aid for initial diagnosis of bladder carcinoma in   patients with hematuria and subsequent monitoring for tumor   recurrence in patients previously diagnosed with bladder   cancer. Appropriate controls are performed and worked correctly. 05/02/21 Reviewed by: RAMESH Clark([facility])   05/04/21 Verified Marley Rajan M.D.                         electronic signature                  I certify that I personally conducted the                  evaluation on the above specimen(s) and                  have rendered the above interpretation(s). [facility], Department of Pathology   [address] [address]     Comments   --------   This test has been scanned by the Lovely automated slide   screener and reviewed by the pathologist (CPT 77335).      Clinical History   ----------------   Clinical Information: n/a     Performed by [address] [address]      4/25/2021  7:47 AM - Alan [facility] Incoming Lab Results From Soft    Specimen Information: Urine, clean catch      Component Collected Lab   Urine Culture, Routine 2021  3:34 PM [address] [facility]   No growth 24 hours    Testing Performed By    Lab - [address] Name Director Address Valid Date Range   999-PQ - [facility] Chrystal Sinclair MD [address] [place] [address] 18 0959-Present   Narrative  Performed by: [facility]  ORDER#: [id number]                          ORDERED BY: RADHA ALANIS   SOURCE: Urine Clean Catch                  COLLECTED:  21 15:34   ANTIBIOTICS AT [facility]OK[facility]. :                      RECEIVED :  21 15:59   Lab and Collection    2021  3:09 PM - Jacob, Ch[facility] Incoming Lab Results From Soft    Narrative  Performed by: [facility]                                       [facility]                                        [address] [facility] [address] [place] [address]                                        [phone]   FINAL CYTOLOGY REPORT   Patient Name: Javier López        Accession No:  [id number]    Age Sex:   1950 66 Y/ F           Location:      [facility]   Account No:   [de-identified]                  Collected:     2021   Med Rec No:    [id number]                     Received:      2021   Attend Phys:   RADHA ALANIS             Completed      2021   Perform Phys: Bonny ALANIS               INTERPRETATION/RESULTS:   Negative for malignancy. ADEQUACY:   Adequate for evaluation. CATEGORIZATION:   Negative for malignancy.      URINE:   NEGATIVE FOR MALIGNANT CELLS         SPECIMEN:   Urine     GROSS DESCRIPTION:    Volume: 120 mL of pink, clear, fixed fluid.    In    1 monolayer   hx Gross Hematuria R31.0             CPT: 97622 X1          Screened by:   FIORDALIZA Douglas M.D.   [facility]                                   2021                                         Electronically signed out by                                                                      Page 1 of 1   Lab and Collection      Assessment: This is a 70 yo female with HTN, Hypothyroidism and prior h/o UTI's related to colovesical fistula s/p repair and recent 3 UTI's treated since 11/20. She has no further interval gross hematuria or UTI symptoms and cytology and FISH were normal. I discussed the option of cystoscopy with bilateral retrogrades and she is not currently interested which is reasonable. There is no evidence for UTI by U/A today. Plan:      1.  F/U 1 yr for Karla Terrazas MD

## 2021-10-27 LAB
AVERAGE GLUCOSE: NORMAL
HBA1C MFR BLD: 5.6 %

## 2022-03-03 RX ORDER — NITROFURANTOIN 25; 75 MG/1; MG/1
100 CAPSULE ORAL 2 TIMES DAILY
Qty: 20 CAPSULE | Refills: 1 | Status: SHIPPED | OUTPATIENT
Start: 2022-03-03

## 2022-07-15 ENCOUNTER — OFFICE VISIT (OUTPATIENT)
Dept: UROLOGY | Age: 72
End: 2022-07-15
Payer: MEDICARE

## 2022-07-15 VITALS
SYSTOLIC BLOOD PRESSURE: 138 MMHG | HEART RATE: 68 BPM | WEIGHT: 176 LBS | DIASTOLIC BLOOD PRESSURE: 80 MMHG | BODY MASS INDEX: 34.55 KG/M2 | HEIGHT: 60 IN

## 2022-07-15 DIAGNOSIS — R31.29 MICROSCOPIC HEMATURIA: Primary | ICD-10-CM

## 2022-07-15 DIAGNOSIS — N39.0 RECURRENT UTI: ICD-10-CM

## 2022-07-15 PROCEDURE — G8427 DOCREV CUR MEDS BY ELIG CLIN: HCPCS | Performed by: UROLOGY

## 2022-07-15 PROCEDURE — 1036F TOBACCO NON-USER: CPT | Performed by: UROLOGY

## 2022-07-15 PROCEDURE — G8400 PT W/DXA NO RESULTS DOC: HCPCS | Performed by: UROLOGY

## 2022-07-15 PROCEDURE — 99214 OFFICE O/P EST MOD 30 MIN: CPT | Performed by: UROLOGY

## 2022-07-15 PROCEDURE — 81003 URINALYSIS AUTO W/O SCOPE: CPT | Performed by: UROLOGY

## 2022-07-15 PROCEDURE — 3017F COLORECTAL CA SCREEN DOC REV: CPT | Performed by: UROLOGY

## 2022-07-15 PROCEDURE — 1090F PRES/ABSN URINE INCON ASSESS: CPT | Performed by: UROLOGY

## 2022-07-15 PROCEDURE — G8417 CALC BMI ABV UP PARAM F/U: HCPCS | Performed by: UROLOGY

## 2022-07-15 PROCEDURE — 1123F ACP DISCUSS/DSCN MKR DOCD: CPT | Performed by: UROLOGY

## 2022-07-15 RX ORDER — CELECOXIB 200 MG/1
CAPSULE ORAL
COMMUNITY
Start: 2022-06-04

## 2022-07-15 RX ORDER — CYANOCOBALAMIN 1000 UG/ML
1 INJECTION INTRAMUSCULAR; SUBCUTANEOUS
COMMUNITY
Start: 2021-12-07

## 2022-07-15 RX ORDER — NITROFURANTOIN 25; 75 MG/1; MG/1
100 CAPSULE ORAL 2 TIMES DAILY
Qty: 14 CAPSULE | Refills: 3 | Status: SHIPPED | OUTPATIENT
Start: 2022-07-15

## 2022-07-15 RX ORDER — FLUTICASONE PROPIONATE 50 MCG
SPRAY, SUSPENSION (ML) NASAL
COMMUNITY
Start: 2021-12-07

## 2022-07-15 RX ORDER — KETOCONAZOLE 20 MG/ML
SHAMPOO TOPICAL
COMMUNITY
Start: 2022-06-27

## 2022-07-15 ASSESSMENT — ENCOUNTER SYMPTOMS
ABDOMINAL PAIN: 0
ABDOMINAL DISTENTION: 0

## 2023-01-01 NOTE — TELEPHONE ENCOUNTER
Pt states you told her to keep it on hand.  For her recurrent UTI NOTE STARTED PRIOR TO SEEING PATIENT    Assessment and Recommendations:  32d female with subdural hematoma consulted for rule out retinal hemorrhages, found to have ***    1. Rule out retinal hemorrhages  -Patient presented for 2 days of fussiness and increased work of breathing at home.  -Imaging findings as above   -now s/p craniectomy on 11/6/23  -On dilated fundus exam ***  -Patient will need repeat dilated fundus exam at 3 months of age.      Seen and discussed with Dr. Moser, attending     Outpatient Follow-up: Patient should follow-up with his/her ophthalmologist or with [facility] Department of Ophthalmology within 1 week of after discharge at:    [address] [facility]  [place] [address]  [phone]    Adelia Palacios MD, PGY-3  Contact: [facility]     Assessment and Recommendations:  32d female with subdural hematoma consulted for rule out retinal hemorrhages, found to have anisocoria, chemosis, and retinal hemorrhages OD.     #Retinal hemorrhages OD  -Patient presented for 2 days of fussiness and increased work of breathing at home.  -Imaging findings as above   - now s/p craniectomy on 11/6/23  -On dilated fundus exam significant hemorrhages seen throughout posterior pole OD  - Retinal exam not yet performed OS, will be done this evening with pediatric ophthalmology attending.    - Will plan to take Retcam images this evening. Ophthalmology will plan to repeat dilation this evening around 5:30pm if okay with primary team   - Findings discussed with primary team, surgery team, and neurosurgery team.    #Anisocoria   - Right pupil 5mm, left pupil 2mm, same in light and dark  - No RAPD on exam today   - Likely secondary to intracranial pathology   - Monitor     #Chemosis OU  - likely secondary to positive pressure from intubation   - please start lacrilube four times a day both eyes (place inside the eyelids, not on the skin)      Discussed with Dr. Moser, attending, and Dr. Nugent, pediatric ophthalmology attending.    Outpatient Follow-up: Patient should follow-up with his/her ophthalmologist or with [facility] Department of Ophthalmology within 1 week of after discharge at:    [address] [facility] [address] [facility]  [place] [address]  [phone]    Adelia Palacios MD, PGY-3  Contact: [employer]

## 2023-01-24 RX ORDER — NITROFURANTOIN 25; 75 MG/1; MG/1
CAPSULE ORAL
Qty: 14 CAPSULE | Refills: 3 | Status: SHIPPED | OUTPATIENT
Start: 2023-01-24

## 2023-02-10 PROBLEM — F32.1 CURRENT MODERATE EPISODE OF MAJOR DEPRESSIVE DISORDER WITHOUT PRIOR EPISODE (MULTI): Status: ACTIVE | Noted: 2023-02-10

## 2023-02-10 PROBLEM — F41.1 GENERALIZED ANXIETY DISORDER: Status: ACTIVE | Noted: 2023-02-10

## 2023-02-10 PROBLEM — E53.8 VITAMIN B12 DEFICIENCY: Status: ACTIVE | Noted: 2023-02-10

## 2023-02-10 PROBLEM — H60.60 CHRONIC OTITIS EXTERNA: Status: ACTIVE | Noted: 2023-02-10

## 2023-02-10 PROBLEM — M25.562 LEFT KNEE PAIN: Status: ACTIVE | Noted: 2023-02-10

## 2023-02-10 PROBLEM — H93.13 TINNITUS OF BOTH EARS: Status: ACTIVE | Noted: 2023-02-10

## 2023-02-10 PROBLEM — E66.811 CLASS 1 OBESITY WITH BODY MASS INDEX (BMI) OF 31.0 TO 31.9 IN ADULT: Status: ACTIVE | Noted: 2023-02-10

## 2023-02-10 PROBLEM — H90.3 BILATERAL HIGH FREQUENCY SENSORINEURAL HEARING LOSS: Status: ACTIVE | Noted: 2023-02-10

## 2023-02-10 PROBLEM — N39.0 FREQUENT UTI: Status: ACTIVE | Noted: 2023-02-10

## 2023-02-10 PROBLEM — R31.9 HEMATURIA: Status: ACTIVE | Noted: 2023-02-10

## 2023-02-10 PROBLEM — K21.9 GERD (GASTROESOPHAGEAL REFLUX DISEASE): Status: ACTIVE | Noted: 2023-02-10

## 2023-02-10 PROBLEM — E66.9 CLASS 1 OBESITY WITH BODY MASS INDEX (BMI) OF 31.0 TO 31.9 IN ADULT: Status: ACTIVE | Noted: 2023-02-10

## 2023-02-10 PROBLEM — M54.50 LOW BACK PAIN: Status: ACTIVE | Noted: 2023-02-10

## 2023-02-10 PROBLEM — R73.03 PREDIABETES: Status: ACTIVE | Noted: 2023-02-10

## 2023-02-10 PROBLEM — R73.9 HYPERGLYCEMIA: Status: ACTIVE | Noted: 2023-02-10

## 2023-02-10 PROBLEM — R10.84 ABDOMINAL PAIN, COLICKY: Status: ACTIVE | Noted: 2023-02-10

## 2023-02-10 PROBLEM — M19.90 ARTHRITIS: Status: ACTIVE | Noted: 2023-02-10

## 2023-02-10 PROBLEM — B00.2 ORAL HERPES: Status: ACTIVE | Noted: 2023-02-10

## 2023-02-10 PROBLEM — M54.6 THORACIC BACK PAIN: Status: ACTIVE | Noted: 2023-02-10

## 2023-02-10 PROBLEM — E03.8 OTHER SPECIFIED HYPOTHYROIDISM: Status: ACTIVE | Noted: 2023-02-10

## 2023-02-10 PROBLEM — F41.9 SEVERE ANXIETY: Status: ACTIVE | Noted: 2023-02-10

## 2023-02-10 PROBLEM — M47.814 DEGENERATIVE ARTHRITIS OF THORACIC SPINE: Status: ACTIVE | Noted: 2023-02-10

## 2023-02-10 PROBLEM — R53.83 FATIGUE: Status: ACTIVE | Noted: 2023-02-10

## 2023-02-10 PROBLEM — D64.9 ANEMIA: Status: ACTIVE | Noted: 2023-02-10

## 2023-02-10 RX ORDER — LEVOTHYROXINE SODIUM 125 UG/1
TABLET ORAL
COMMUNITY
Start: 2019-06-10 | End: 2023-10-09

## 2023-02-10 RX ORDER — METFORMIN HYDROCHLORIDE 500 MG/1
TABLET, EXTENDED RELEASE ORAL
COMMUNITY
Start: 2022-08-23 | End: 2024-01-04

## 2023-02-10 RX ORDER — DICLOFENAC SODIUM 10 MG/G
GEL TOPICAL
COMMUNITY
End: 2023-11-14

## 2023-02-10 RX ORDER — FAMCICLOVIR 500 MG/1
500 TABLET ORAL
COMMUNITY
Start: 2021-02-05 | End: 2024-01-04

## 2023-02-10 RX ORDER — ACYCLOVIR 50 MG/G
CREAM TOPICAL
COMMUNITY
Start: 2021-02-05 | End: 2023-05-08 | Stop reason: SDUPTHER

## 2023-02-10 RX ORDER — CYANOCOBALAMIN 1000 UG/ML
INJECTION, SOLUTION INTRAMUSCULAR; SUBCUTANEOUS
COMMUNITY
Start: 2022-10-26 | End: 2023-11-14

## 2023-02-10 RX ORDER — MOMETASONE FUROATE 1 MG/G
CREAM TOPICAL
COMMUNITY
Start: 2022-10-05 | End: 2023-11-14

## 2023-02-10 RX ORDER — MELOXICAM 15 MG/1
15 TABLET ORAL DAILY PRN
COMMUNITY
Start: 2022-08-01 | End: 2024-01-30

## 2023-02-10 RX ORDER — ALPRAZOLAM 0.5 MG/1
TABLET ORAL
COMMUNITY
Start: 2019-12-09 | End: 2023-04-04

## 2023-02-23 LAB
APPEARANCE, URINE: CLEAR
BILIRUBIN, URINE: NEGATIVE
BLOOD, URINE: ABNORMAL
COLOR, URINE: YELLOW
COMPLEMENT C3 (MG/DL) IN SER/PLAS: 150 MG/DL (ref 87–200)
COMPLEMENT C4 (MG/DL) IN SER/PLAS: 26 MG/DL (ref 10–50)
GLUCOSE, URINE: NEGATIVE MG/DL
KETONES, URINE: ABNORMAL MG/DL
LEUKOCYTE ESTERASE, URINE: ABNORMAL
NITRITE, URINE: NEGATIVE
PH, URINE: 5 (ref 5–8)
PROTEIN, URINE: NEGATIVE MG/DL
RBC, URINE: 1 /HPF (ref 0–5)
SPECIFIC GRAVITY, URINE: 1.02 (ref 1–1.03)
SQUAMOUS EPITHELIAL CELLS, URINE: 1 /HPF
UROBILINOGEN, URINE: <2 MG/DL (ref 0–1.9)
WBC, URINE: 2 /HPF (ref 0–5)

## 2023-03-06 ENCOUNTER — CLINICAL SUPPORT (OUTPATIENT)
Dept: PRIMARY CARE | Facility: CLINIC | Age: 73
End: 2023-03-06
Payer: MEDICARE

## 2023-03-06 DIAGNOSIS — E53.8 VITAMIN B12 DEFICIENCY: Primary | ICD-10-CM

## 2023-03-06 PROCEDURE — 96372 THER/PROPH/DIAG INJ SC/IM: CPT | Performed by: FAMILY MEDICINE

## 2023-03-06 RX ORDER — CYANOCOBALAMIN 1000 UG/ML
1000 INJECTION, SOLUTION INTRAMUSCULAR; SUBCUTANEOUS ONCE
Status: COMPLETED | OUTPATIENT
Start: 2023-03-06 | End: 2023-03-06

## 2023-03-06 RX ADMIN — CYANOCOBALAMIN 1000 MCG: 1000 INJECTION, SOLUTION INTRAMUSCULAR; SUBCUTANEOUS at 15:42

## 2023-03-06 NOTE — PROGRESS NOTES
Patient is here today for a B12 injection which is a standing order. Administered in left arm. Patient tolerated well.

## 2023-04-03 ENCOUNTER — CLINICAL SUPPORT (OUTPATIENT)
Dept: PRIMARY CARE | Facility: CLINIC | Age: 73
End: 2023-04-03
Payer: MEDICARE

## 2023-04-03 VITALS — WEIGHT: 167 LBS | BODY MASS INDEX: 31.04 KG/M2

## 2023-04-03 DIAGNOSIS — E53.8 VITAMIN B12 DEFICIENCY: ICD-10-CM

## 2023-04-03 PROCEDURE — 96372 THER/PROPH/DIAG INJ SC/IM: CPT | Performed by: FAMILY MEDICINE

## 2023-04-03 RX ORDER — CYANOCOBALAMIN 1000 UG/ML
1000 INJECTION, SOLUTION INTRAMUSCULAR; SUBCUTANEOUS ONCE
Status: COMPLETED | OUTPATIENT
Start: 2023-04-03 | End: 2023-04-03

## 2023-04-03 RX ADMIN — CYANOCOBALAMIN 1000 MCG: 1000 INJECTION, SOLUTION INTRAMUSCULAR; SUBCUTANEOUS at 15:30

## 2023-04-03 NOTE — PROGRESS NOTES
Patient is here today for a B12 injection which is a standing order. Administer in LA. Patient tolerated well.

## 2023-04-04 DIAGNOSIS — F41.1 GENERALIZED ANXIETY DISORDER: ICD-10-CM

## 2023-04-04 RX ORDER — ALPRAZOLAM 0.5 MG/1
TABLET ORAL
Qty: 1 TABLET | Refills: 0 | Status: SHIPPED | OUTPATIENT
Start: 2023-04-04 | End: 2023-05-08 | Stop reason: SDUPTHER

## 2023-05-01 ENCOUNTER — APPOINTMENT (OUTPATIENT)
Dept: PRIMARY CARE | Facility: CLINIC | Age: 73
End: 2023-05-01

## 2023-05-04 PROBLEM — M54.6 THORACIC BACK PAIN: Status: RESOLVED | Noted: 2023-02-10 | Resolved: 2023-05-04

## 2023-05-04 PROBLEM — M54.50 LOW BACK PAIN: Status: RESOLVED | Noted: 2023-02-10 | Resolved: 2023-05-04

## 2023-05-04 PROBLEM — M25.562 LEFT KNEE PAIN: Status: RESOLVED | Noted: 2023-02-10 | Resolved: 2023-05-04

## 2023-05-04 PROBLEM — Z93.3 COLOSTOMY STATUS (MULTI): Status: RESOLVED | Noted: 2019-02-25 | Resolved: 2023-05-04

## 2023-05-04 PROBLEM — K57.92 DIVERTICULITIS: Status: ACTIVE | Noted: 2019-01-24

## 2023-05-04 PROBLEM — N82.4 COLOVAGINAL FISTULA: Status: RESOLVED | Noted: 2018-09-19 | Resolved: 2023-05-04

## 2023-05-04 PROBLEM — Z93.3 COLOSTOMY STATUS (MULTI): Status: ACTIVE | Noted: 2019-02-25

## 2023-05-04 PROBLEM — N82.4 COLOVAGINAL FISTULA: Status: ACTIVE | Noted: 2018-09-19

## 2023-05-08 ENCOUNTER — TELEPHONE (OUTPATIENT)
Dept: PRIMARY CARE | Facility: CLINIC | Age: 73
End: 2023-05-08

## 2023-05-08 ENCOUNTER — OFFICE VISIT (OUTPATIENT)
Dept: PRIMARY CARE | Facility: CLINIC | Age: 73
End: 2023-05-08
Payer: MEDICARE

## 2023-05-08 VITALS
WEIGHT: 158 LBS | TEMPERATURE: 97.7 F | OXYGEN SATURATION: 98 % | HEART RATE: 68 BPM | DIASTOLIC BLOOD PRESSURE: 62 MMHG | SYSTOLIC BLOOD PRESSURE: 128 MMHG | RESPIRATION RATE: 16 BRPM | BODY MASS INDEX: 29.37 KG/M2

## 2023-05-08 DIAGNOSIS — E03.8 OTHER SPECIFIED HYPOTHYROIDISM: ICD-10-CM

## 2023-05-08 DIAGNOSIS — M19.90 ARTHRITIS: ICD-10-CM

## 2023-05-08 DIAGNOSIS — F32.1 CURRENT MODERATE EPISODE OF MAJOR DEPRESSIVE DISORDER WITHOUT PRIOR EPISODE (MULTI): ICD-10-CM

## 2023-05-08 DIAGNOSIS — R73.03 PREDIABETES: ICD-10-CM

## 2023-05-08 DIAGNOSIS — E78.5 DYSLIPIDEMIA: ICD-10-CM

## 2023-05-08 DIAGNOSIS — R73.9 HYPERGLYCEMIA: ICD-10-CM

## 2023-05-08 DIAGNOSIS — E53.8 VITAMIN B12 DEFICIENCY: ICD-10-CM

## 2023-05-08 DIAGNOSIS — Z12.31 SCREENING MAMMOGRAM, ENCOUNTER FOR: Primary | ICD-10-CM

## 2023-05-08 DIAGNOSIS — F41.1 GENERALIZED ANXIETY DISORDER: ICD-10-CM

## 2023-05-08 DIAGNOSIS — B00.2 ORAL HERPES: ICD-10-CM

## 2023-05-08 DIAGNOSIS — D64.9 ANEMIA, UNSPECIFIED TYPE: ICD-10-CM

## 2023-05-08 PROCEDURE — 1036F TOBACCO NON-USER: CPT | Performed by: FAMILY MEDICINE

## 2023-05-08 PROCEDURE — 99214 OFFICE O/P EST MOD 30 MIN: CPT | Performed by: FAMILY MEDICINE

## 2023-05-08 PROCEDURE — 96372 THER/PROPH/DIAG INJ SC/IM: CPT | Performed by: FAMILY MEDICINE

## 2023-05-08 PROCEDURE — 1159F MED LIST DOCD IN RCRD: CPT | Performed by: FAMILY MEDICINE

## 2023-05-08 RX ORDER — CYANOCOBALAMIN 1000 UG/ML
1000 INJECTION, SOLUTION INTRAMUSCULAR; SUBCUTANEOUS ONCE
Status: COMPLETED | OUTPATIENT
Start: 2023-05-08 | End: 2023-05-08

## 2023-05-08 RX ORDER — ALPRAZOLAM 0.5 MG/1
0.25 TABLET ORAL NIGHTLY PRN
Qty: 30 TABLET | Refills: 2 | Status: SHIPPED | OUTPATIENT
Start: 2023-05-08 | End: 2023-08-15 | Stop reason: SDUPTHER

## 2023-05-08 RX ORDER — ACYCLOVIR 50 MG/G
CREAM TOPICAL
Qty: 5 G | Refills: 3 | Status: SHIPPED | OUTPATIENT
Start: 2023-05-08 | End: 2023-11-14

## 2023-05-08 RX ADMIN — CYANOCOBALAMIN 1000 MCG: 1000 INJECTION, SOLUTION INTRAMUSCULAR; SUBCUTANEOUS at 15:15

## 2023-05-08 NOTE — PROGRESS NOTES
"Subjective   Patient ID: Valerie Orona is a 73 y.o. female who presents for Hyperglycemia, Hypothyroidism, Anxiety, Med Management, and Suspicious Skin Lesion (On back-has been there for a couple of months).  Covid vax: x 5  CRC: 2018  Mammogram: 8/2022  Pap: n/a  Lmp: n/a  HPI  Patient Active Problem List   Diagnosis    Abdominal pain, colicky    Anemia    Arthritis    Bilateral high frequency sensorineural hearing loss    Chronic otitis externa    Current moderate episode of major depressive disorder without prior episode (CMS/HCC)    Fatigue    Generalized anxiety disorder    GERD (gastroesophageal reflux disease)    Hematuria    Hyperglycemia    Oral herpes    Degenerative arthritis of thoracic spine    Other specified hypothyroidism    Prediabetes    Tinnitus of both ears    Vitamin B12 deficiency    Frequent UTI    Class 1 obesity with body mass index (BMI) of 31.0 to 31.9 in adult    Diverticulitis       Past Surgical History:   Procedure Laterality Date    BREAST SURGERY  1990    reduction    CATARACT EXTRACTION      CHOLECYSTECTOMY  2000    COLONOSCOPY  08/2018    suboptimal prep -tics and bowel surgery    COLOSTOMY  09/2018    COLOSTOMY CLOSURE  03/2019    TOTAL ABDOMINAL HYSTERECTOMY W/ BILATERAL SALPINGOOPHORECTOMY  1996    no CA       Review of Systems  This patient has   NO history of recent Covid nor flu symptoms,  NO Fever nor chills,  NO Chest pain, shortness of breath nor paroxysmal nocturnal dyspnea,  NO Nausea, vomiting, nor diarrhea,  NO Hematochezia nor melena,  NO Dysuria, hematuria, nor new incontinence issues  NO new severe headaches nor neurological complaints,  NO new issues with anxiety nor depression nor new psychiatric complaints,  NO suicidal nor homicidal ideations.   Saw rheum for abn labs  Saw urology sees qyr    OBJECTIVE:  /62   Pulse 68   Temp 36.5 °C (97.7 °F) (Temporal)   Resp 16   Ht (P) 1.562 m (5' 1.5\")   Wt 71.7 kg (158 lb)   LMP  (LMP Unknown)   SpO2 98%  "  BMI (P) 29.37 kg/m²      General:  alert, oriented, no acute distress.  No obvious skin rashes noted.   No gait disturbance noted.    Mood is pleasant, not tearful, no signs of emotional distress.  Not appearing intoxicated or altered.   No voiced delusions,   Normal, appropriate behavior.    HEENT: Normocephalic, atraumatic,   Pupils round, reactive to light  Extraocular motions intact and wnl  Tympanic membranes normal    Neck: no nuchal rigidity  No masses palpable.  No carotid bruits.  No thyromegaly.    Respiratory: Equal breath sounds  No wheezes,    rales,    nor rhonchi  No respiratory distress.    Heart: Regular rate and rhythm, no    murmurs  no rubs/gallops    Abdomen: no masses palpable, nontender, no rebound nor guarding.    Extremities: NO cyanosis noted, no clubbing.   No edema noted.  2+dorsalis pedis pulses.    Normal-not antalgic, steady gait.    Orders Only on 02/23/2023   Component Date Value Ref Range Status    WBC, Urine 02/23/2023 2  0 - 5 /HPF Final    RBC, Urine 02/23/2023 1  0 - 5 /HPF Final    Squamous Epithelial Cells, Urine 02/23/2023 1  /HPF Final   Orders Only on 02/23/2023   Component Date Value Ref Range Status    COMPLEMENT C3 (MG/DL) IN SER/PLAS 02/23/2023 150  87 - 200 mg/dL Final   Orders Only on 02/23/2023   Component Date Value Ref Range Status    COMPLEMENT C4 (MG/DL) IN SER/PLAS 02/23/2023 26  10 - 50 mg/dL Final   Orders Only on 02/23/2023   Component Date Value Ref Range Status    Color, Urine 02/23/2023 YELLOW  STRAW,YELLOW Final    Appearance, Urine 02/23/2023 CLEAR  CLEAR Final    Specific Gravity, Urine 02/23/2023 1.017  1.005 - 1.035 Final    pH, Urine 02/23/2023 5.0  5.0 - 8.0 Final    Protein, Urine 02/23/2023 NEGATIVE  NEGATIVE mg/dL Final    Glucose, Urine 02/23/2023 NEGATIVE  NEGATIVE mg/dL Final    Blood, Urine 02/23/2023 MODERATE(2+) (A)  NEGATIVE Final    Ketones, Urine 02/23/2023 5 (TRACE) (A)  NEGATIVE mg/dL Final    Bilirubin, Urine 02/23/2023 NEGATIVE   NEGATIVE Final    Urobilinogen, Urine 02/23/2023 <2.0  0.0 - 1.9 mg/dL Final    Nitrite, Urine 02/23/2023 NEGATIVE  NEGATIVE Final    Leukocyte Esterase, Urine 02/23/2023 TRACE (A)  NEGATIVE Final        Assessment/Plan     Problem List Items Addressed This Visit          Musculoskeletal    Arthritis       Endocrine/Metabolic    Other specified hypothyroidism    Prediabetes    Vitamin B12 deficiency    Relevant Medications    cyanocobalamin (Vitamin B-12) injection 1,000 mcg (Start on 5/8/2023  3:00 PM)       Hematologic    Anemia       Infectious/Inflammatory    Oral herpes    Relevant Medications    acyclovir (Zovirax) 5 % cream       Other    Current moderate episode of major depressive disorder without prior episode (CMS/HCC)    Generalized anxiety disorder    Relevant Medications    ALPRAZolam (Xanax) 0.5 mg tablet    Hyperglycemia     Other Visit Diagnoses       Screening mammogram, encounter for    -  Primary    Relevant Orders    BI mammo bilateral screening tomosynthesis            Will take full thyroid meds all days but sundays  Instead of one day skip and one d /2  Oc xanax Overuse and abuse potential discussed with patient (parents if applicable)  If mood deterioration, status change etc on medicine, suicidal or homicidal ideations -patient agrees to proceed with crisis plan-in place-including-not limited to contacting family, our office and or proceeding to ER.    It is recommended that OARRS reports be checked and patient have appointment at least every 3months(6 for certain medicines only)  If the agreement signed (controlled substance agreement) is violated prescriptions may be stopped abruptly and patient /family understands and agrees to this.  Another reason for termination of agreement is if we have concern for abuse or overuse and it is also recommended that patient take responsibility to try to taper and minimize use of these medicines frequently trying to limit or gradually taper to  discontinuation.    Patient is aware that side effects such as insomnia, unexpected weight changes are unexpected and should result in discontinuation.  Always use caution and AVOID operating machinery(driving etc) on pain medicines or CNS depressants and avoid combining together OR with alcohol. If opioids are prescribed patient understands the benefits of narcan and was offered prescription.  Follow up sooner if condition deteriorates or problems arise.    Agrees to regular follow and counseling for maximum benefits.    On GOLO diet feels better down 10lbs  See me 6mo  Labs in 3mo  Rba of all meds addressed  Save mobic for bad days rba addressed  Declines scope for now  3mm irritated sk on L forearm  One lesion epidermoid cyst   on back 1/2cm for removal  45 min end day

## 2023-05-17 ENCOUNTER — PROCEDURE VISIT (OUTPATIENT)
Dept: PRIMARY CARE | Facility: CLINIC | Age: 73
End: 2023-05-17
Payer: MEDICARE

## 2023-05-17 VITALS
TEMPERATURE: 97.5 F | RESPIRATION RATE: 16 BRPM | BODY MASS INDEX: 28.71 KG/M2 | HEIGHT: 62 IN | OXYGEN SATURATION: 98 % | DIASTOLIC BLOOD PRESSURE: 58 MMHG | SYSTOLIC BLOOD PRESSURE: 120 MMHG | WEIGHT: 156 LBS | HEART RATE: 70 BPM

## 2023-05-17 DIAGNOSIS — R73.03 PREDIABETES: ICD-10-CM

## 2023-05-17 DIAGNOSIS — D64.9 ANEMIA, UNSPECIFIED TYPE: ICD-10-CM

## 2023-05-17 DIAGNOSIS — R73.9 HYPERGLYCEMIA: ICD-10-CM

## 2023-05-17 DIAGNOSIS — E03.8 OTHER SPECIFIED HYPOTHYROIDISM: ICD-10-CM

## 2023-05-17 DIAGNOSIS — L72.0 EPIDERMOID CYST: Primary | ICD-10-CM

## 2023-05-17 DIAGNOSIS — L98.9 SKIN LESIONS: ICD-10-CM

## 2023-05-17 DIAGNOSIS — F32.1 CURRENT MODERATE EPISODE OF MAJOR DEPRESSIVE DISORDER WITHOUT PRIOR EPISODE (MULTI): ICD-10-CM

## 2023-05-17 DIAGNOSIS — E53.8 VITAMIN B12 DEFICIENCY: ICD-10-CM

## 2023-05-17 DIAGNOSIS — F41.1 GENERALIZED ANXIETY DISORDER: ICD-10-CM

## 2023-05-17 PROCEDURE — 0753T DGTZ GLS MCRSCP SLD LEVEL IV: CPT

## 2023-05-17 PROCEDURE — 99213 OFFICE O/P EST LOW 20 MIN: CPT | Performed by: FAMILY MEDICINE

## 2023-05-17 PROCEDURE — 88304 TISSUE EXAM BY PATHOLOGIST: CPT | Performed by: PATHOLOGY

## 2023-05-17 PROCEDURE — 0752T DGTZ GLS MCRSCP SLD LVL III: CPT

## 2023-05-17 PROCEDURE — 88304 TISSUE EXAM BY PATHOLOGIST: CPT

## 2023-05-17 PROCEDURE — 88305 TISSUE EXAM BY PATHOLOGIST: CPT | Performed by: PATHOLOGY

## 2023-05-17 PROCEDURE — 88305 TISSUE EXAM BY PATHOLOGIST: CPT

## 2023-05-17 RX ORDER — CEPHALEXIN 500 MG/1
500 CAPSULE ORAL 2 TIMES DAILY
Qty: 20 CAPSULE | Refills: 0 | Status: SHIPPED | OUTPATIENT
Start: 2023-05-17 | End: 2023-05-27

## 2023-05-17 NOTE — PROGRESS NOTES
"  Subjective   Patient ID: Valerie Orona is a 73 y.o. female who presents for Lesion Removal (Back, left forearm, left hand).  BIOPSY  Covid vax: x 5  CRC: 2018  Mammogram: 8/2022  Pap: n/a  Lmp: n/a  HPI  Patient Active Problem List   Diagnosis    Abdominal pain, colicky    Anemia    Arthritis    Bilateral high frequency sensorineural hearing loss    Chronic otitis externa    Current moderate episode of major depressive disorder without prior episode (CMS/HCC)    Fatigue    Generalized anxiety disorder    GERD (gastroesophageal reflux disease)    Hematuria    Hyperglycemia    Oral herpes    Degenerative arthritis of thoracic spine    Other specified hypothyroidism    Prediabetes    Tinnitus of both ears    Vitamin B12 deficiency    Frequent UTI    Class 1 obesity with body mass index (BMI) of 31.0 to 31.9 in adult    Diverticulitis     Review of Systems  This patient has   NO history of recent Covid nor flu symptoms,  NO Fever nor chills,  NO Chest pain, shortness of breath nor paroxysmal nocturnal dyspnea,  NO Nausea, vomiting, nor diarrhea,  NO Hematochezia nor melena,  NO Dysuria, hematuria, nor new incontinence issues  NO new severe headaches nor neurological complaints,  NO new issues with anxiety nor depression nor new psychiatric complaints,  NO suicidal nor homicidal ideations.     OBJECTIVE:  /58   Pulse 70   Temp 36.4 °C (97.5 °F) (Temporal)   Resp 16   Ht 1.562 m (5' 1.5\")   Wt 70.8 kg (156 lb)   LMP  (LMP Unknown)   SpO2 98%   BMI 29.00 kg/m²      General:  alert, oriented, no acute distress.  No obvious skin rashes noted.   No gait disturbance noted.    Mood is pleasant, not tearful, no signs of emotional distress.  Normal, appropriate behavior.    HEENT: Normocephalic, atraumatic,   Pupils round, reactive to light    Neck: no nuchal rigidity  Respiratory: Equal breath sounds  No wheezes,    rales,    nor rhonchi  No respiratory distress.    Heart: Regular rate and rhythm, no    " murmurs  no rubs/gallops    Abdomen: no masses palpable, no rebound nor guarding, no rebound nor guarding.    Extremities: NO cyanosis noted, no clubbing.   No edema noted.  Normal-not antalgic, steady gait.      BIOPSY:  The patient was prepped and draped in normal sterile fashion and fully consented for a biopsy of sites identified:     Lesion d L hand btw 3-4th digit L forearm ventral 3mm  ? Sk  Epidermoid cyst mid back and just above             2  cc lidocaine    with     epi total injected          Elliptical biopsy performed     Excellent hemostasis obtained with      electrocautery  EBL  <   2 ml    2 sutures placed    Patient tolerated procedure well no complications or unanticipated events during procedure.    Patient was instructed on wound care, signs and symptoms of infection and instructed to call if occur.  If uncontrolled bleeding-call or ER as this is not anticipated.    Recheck would advised at next scheduled visit-sooner if issues arise.  Orders Only on 02/23/2023   Component Date Value Ref Range Status    WBC, Urine 02/23/2023 2  0 - 5 /HPF Final    RBC, Urine 02/23/2023 1  0 - 5 /HPF Final    Squamous Epithelial Cells, Urine 02/23/2023 1  /HPF Final   Orders Only on 02/23/2023   Component Date Value Ref Range Status    COMPLEMENT C3 (MG/DL) IN SER/PLAS 02/23/2023 150  87 - 200 mg/dL Final   Orders Only on 02/23/2023   Component Date Value Ref Range Status    COMPLEMENT C4 (MG/DL) IN SER/PLAS 02/23/2023 26  10 - 50 mg/dL Final   Orders Only on 02/23/2023   Component Date Value Ref Range Status    Color, Urine 02/23/2023 YELLOW  STRAW,YELLOW Final    Appearance, Urine 02/23/2023 CLEAR  CLEAR Final    Specific Gravity, Urine 02/23/2023 1.017  1.005 - 1.035 Final    pH, Urine 02/23/2023 5.0  5.0 - 8.0 Final    Protein, Urine 02/23/2023 NEGATIVE  NEGATIVE mg/dL Final    Glucose, Urine 02/23/2023 NEGATIVE  NEGATIVE mg/dL Final    Blood, Urine 02/23/2023 MODERATE(2+) (A)  NEGATIVE Final    Ketones,  Urine 02/23/2023 5 (TRACE) (A)  NEGATIVE mg/dL Final    Bilirubin, Urine 02/23/2023 NEGATIVE  NEGATIVE Final    Urobilinogen, Urine 02/23/2023 <2.0  0.0 - 1.9 mg/dL Final    Nitrite, Urine 02/23/2023 NEGATIVE  NEGATIVE Final    Leukocyte Esterase, Urine 02/23/2023 TRACE (A)  NEGATIVE Final        Assessment/Plan     Problem List Items Addressed This Visit          Endocrine/Metabolic    Other specified hypothyroidism    Prediabetes    Vitamin B12 deficiency       Hematologic    Anemia       Other    Current moderate episode of major depressive disorder without prior episode (CMS/[facility])    Generalized anxiety disorder    Hyperglycemia       Patient was informed of wound care and signs and symptoms of infection necessitating a phone call/ office visit or emergency room visit. Expected course of healing and common reactions also discussed. If specimen sent for pathology results should be able to be seen on my chart in 1-2 weeks and or letter sent. If nothing received by 3 weeks time-patient is aware they need to contact office for results as this is not usual.     The risks, benefits, and alternatives for the antibiotic prescribed were discussed with patient as well as possible side effects. If ANY signs or symptoms of allergic reaction patient is to discontinue medicine immediately and call us or GO TO ER if tongue swelling/respiratory issues or significant effects.  It has already been determined that the benefits outweigh the risks of an antibiotic for you at this time, unless otherwise indicated.  Antibiotics are usually meant to be taken to completion as advised and it is usual course for symptoms to IMPROVE while taking-if symptoms worsen or new problems arise, we should be notified or medical evaluation should occur. Persistent side effects such as diarrhea especially after antibiotic discontinuation are unusual, and should prompt assessment and evaluation either in office or ER depending on severity. Rash, and/or  other symptoms of concern should also cause evaluation.  Complete resolution of original symptoms are expected and we should be informed if this does not occur.  Empiric keflex x5-7d  Stx OUT 2-3wks  8/2023 labs

## 2023-05-25 LAB
COMPLETE PATHOLOGY REPORT: NORMAL
CONVERTED CLINICAL DIAGNOSIS-HISTORY: NORMAL
CONVERTED FINAL DIAGNOSIS: NORMAL
CONVERTED FINAL REPORT PDF LINK TO COPY AND PASTE: NORMAL
CONVERTED GROSS DESCRIPTION: NORMAL

## 2023-05-31 NOTE — PROGRESS NOTES
Subjective   Patient ID: Valerie Orona is a 73 y.o. female who presents for Follow-up (Dr Kowalski pt here today for a follow up for seeing EFREN on 5/17 for a cyst on back; here today to remove sutures. Wants to go over results as well. ).    HPI     Pt saw Dr. Kowalski on 5/17/23 for three biopsies on her back, left forearm and left hand. She is here today for suture removal     Physical Exam  Post op site scabbing and healing well as expected     Two sutures removed without difficulty or complication     Assessment/Plan   Problem List Items Addressed This Visit    None    I educated the pt about the biopsy results (epidermoid cyst on back, SK on left forearm, solar elastosis on left hand) - no concerns there   Healing well   Follow up if any new concerns

## 2023-06-01 ENCOUNTER — OFFICE VISIT (OUTPATIENT)
Dept: PRIMARY CARE | Facility: CLINIC | Age: 73
End: 2023-06-01
Payer: MEDICARE

## 2023-06-01 DIAGNOSIS — L72.0 EPIDERMOID CYST: Primary | ICD-10-CM

## 2023-06-01 PROCEDURE — 1036F TOBACCO NON-USER: CPT | Performed by: PHYSICIAN ASSISTANT

## 2023-06-01 PROCEDURE — 99212 OFFICE O/P EST SF 10 MIN: CPT | Performed by: PHYSICIAN ASSISTANT

## 2023-06-01 PROCEDURE — 1159F MED LIST DOCD IN RCRD: CPT | Performed by: PHYSICIAN ASSISTANT

## 2023-06-16 ENCOUNTER — APPOINTMENT (OUTPATIENT)
Dept: PRIMARY CARE | Facility: CLINIC | Age: 73
End: 2023-06-16
Payer: COMMERCIAL

## 2023-07-28 PROBLEM — R73.9 HYPERGLYCEMIA: Status: RESOLVED | Noted: 2023-02-10 | Resolved: 2023-07-28

## 2023-08-08 ENCOUNTER — LAB (OUTPATIENT)
Dept: LAB | Facility: LAB | Age: 73
End: 2023-08-08
Payer: MEDICARE

## 2023-08-08 ENCOUNTER — APPOINTMENT (OUTPATIENT)
Dept: PRIMARY CARE | Facility: CLINIC | Age: 73
End: 2023-08-08
Payer: COMMERCIAL

## 2023-08-08 DIAGNOSIS — R73.03 PREDIABETES: ICD-10-CM

## 2023-08-08 DIAGNOSIS — R73.9 HYPERGLYCEMIA: ICD-10-CM

## 2023-08-08 DIAGNOSIS — E78.5 DYSLIPIDEMIA: ICD-10-CM

## 2023-08-08 LAB
ALANINE AMINOTRANSFERASE (SGPT) (U/L) IN SER/PLAS: 15 U/L (ref 7–45)
ALBUMIN (G/DL) IN SER/PLAS: 4.4 G/DL (ref 3.4–5)
ALKALINE PHOSPHATASE (U/L) IN SER/PLAS: 56 U/L (ref 33–136)
ANION GAP IN SER/PLAS: 12 MMOL/L (ref 10–20)
ASPARTATE AMINOTRANSFERASE (SGOT) (U/L) IN SER/PLAS: 15 U/L (ref 9–39)
BILIRUBIN TOTAL (MG/DL) IN SER/PLAS: 0.4 MG/DL (ref 0–1.2)
CALCIUM (MG/DL) IN SER/PLAS: 9.4 MG/DL (ref 8.6–10.3)
CARBON DIOXIDE, TOTAL (MMOL/L) IN SER/PLAS: 28 MMOL/L (ref 21–32)
CHLORIDE (MMOL/L) IN SER/PLAS: 105 MMOL/L (ref 98–107)
CHOLESTEROL (MG/DL) IN SER/PLAS: 193 MG/DL (ref 0–199)
CHOLESTEROL IN HDL (MG/DL) IN SER/PLAS: 72.6 MG/DL
CHOLESTEROL/HDL RATIO: 2.7
CREATININE (MG/DL) IN SER/PLAS: 0.64 MG/DL (ref 0.5–1.05)
ESTIMATED AVERAGE GLUCOSE FOR HBA1C: 105 MG/DL
GFR FEMALE: >90 ML/MIN/1.73M2
GLUCOSE (MG/DL) IN SER/PLAS: 86 MG/DL (ref 74–99)
HEMOGLOBIN A1C/HEMOGLOBIN TOTAL IN BLOOD: 5.3 %
LDL: 100 MG/DL (ref 0–99)
POTASSIUM (MMOL/L) IN SER/PLAS: 4.2 MMOL/L (ref 3.5–5.3)
PROTEIN TOTAL: 6.6 G/DL (ref 6.4–8.2)
SODIUM (MMOL/L) IN SER/PLAS: 141 MMOL/L (ref 136–145)
TRIGLYCERIDE (MG/DL) IN SER/PLAS: 100 MG/DL (ref 0–149)
UREA NITROGEN (MG/DL) IN SER/PLAS: 21 MG/DL (ref 6–23)
VLDL: 20 MG/DL (ref 0–40)

## 2023-08-08 PROCEDURE — 83036 HEMOGLOBIN GLYCOSYLATED A1C: CPT

## 2023-08-08 PROCEDURE — 80061 LIPID PANEL: CPT

## 2023-08-08 PROCEDURE — 36415 COLL VENOUS BLD VENIPUNCTURE: CPT

## 2023-08-08 PROCEDURE — 80053 COMPREHEN METABOLIC PANEL: CPT

## 2023-08-15 ENCOUNTER — OFFICE VISIT (OUTPATIENT)
Dept: PRIMARY CARE | Facility: CLINIC | Age: 73
End: 2023-08-15
Payer: MEDICARE

## 2023-08-15 VITALS
HEART RATE: 68 BPM | OXYGEN SATURATION: 98 % | TEMPERATURE: 97.5 F | RESPIRATION RATE: 16 BRPM | HEIGHT: 62 IN | DIASTOLIC BLOOD PRESSURE: 64 MMHG | BODY MASS INDEX: 27.97 KG/M2 | WEIGHT: 152 LBS | SYSTOLIC BLOOD PRESSURE: 124 MMHG

## 2023-08-15 DIAGNOSIS — Z00.00 ROUTINE GENERAL MEDICAL EXAMINATION AT HEALTH CARE FACILITY: ICD-10-CM

## 2023-08-15 DIAGNOSIS — Z00.00 ENCOUNTER FOR MEDICARE ANNUAL WELLNESS EXAM: Primary | ICD-10-CM

## 2023-08-15 DIAGNOSIS — F32.1 CURRENT MODERATE EPISODE OF MAJOR DEPRESSIVE DISORDER WITHOUT PRIOR EPISODE (MULTI): ICD-10-CM

## 2023-08-15 DIAGNOSIS — R73.03 PREDIABETES: ICD-10-CM

## 2023-08-15 DIAGNOSIS — E03.8 OTHER SPECIFIED HYPOTHYROIDISM: ICD-10-CM

## 2023-08-15 DIAGNOSIS — D64.9 ANEMIA, UNSPECIFIED TYPE: ICD-10-CM

## 2023-08-15 DIAGNOSIS — F41.1 GENERALIZED ANXIETY DISORDER: ICD-10-CM

## 2023-08-15 DIAGNOSIS — E53.8 VITAMIN B12 DEFICIENCY: ICD-10-CM

## 2023-08-15 DIAGNOSIS — Z79.899 MEDICATION MANAGEMENT: ICD-10-CM

## 2023-08-15 PROCEDURE — G0439 PPPS, SUBSEQ VISIT: HCPCS | Performed by: FAMILY MEDICINE

## 2023-08-15 PROCEDURE — 1159F MED LIST DOCD IN RCRD: CPT | Performed by: FAMILY MEDICINE

## 2023-08-15 PROCEDURE — 1170F FXNL STATUS ASSESSED: CPT | Performed by: FAMILY MEDICINE

## 2023-08-15 PROCEDURE — 99213 OFFICE O/P EST LOW 20 MIN: CPT | Performed by: FAMILY MEDICINE

## 2023-08-15 PROCEDURE — 1160F RVW MEDS BY RX/DR IN RCRD: CPT | Performed by: FAMILY MEDICINE

## 2023-08-15 PROCEDURE — 1036F TOBACCO NON-USER: CPT | Performed by: FAMILY MEDICINE

## 2023-08-15 PROCEDURE — 96372 THER/PROPH/DIAG INJ SC/IM: CPT | Performed by: FAMILY MEDICINE

## 2023-08-15 PROCEDURE — 80307 DRUG TEST PRSMV CHEM ANLYZR: CPT

## 2023-08-15 RX ORDER — ALPRAZOLAM 0.5 MG/1
0.25 TABLET ORAL NIGHTLY PRN
Qty: 30 TABLET | Refills: 2 | Status: SHIPPED | OUTPATIENT
Start: 2023-08-15 | End: 2024-05-22 | Stop reason: SDUPTHER

## 2023-08-15 RX ORDER — CYANOCOBALAMIN 1000 UG/ML
1000 INJECTION, SOLUTION INTRAMUSCULAR; SUBCUTANEOUS ONCE
Status: COMPLETED | OUTPATIENT
Start: 2023-08-15 | End: 2023-08-15

## 2023-08-15 RX ADMIN — CYANOCOBALAMIN 1000 MCG: 1000 INJECTION, SOLUTION INTRAMUSCULAR; SUBCUTANEOUS at 11:57

## 2023-08-15 ASSESSMENT — ACTIVITIES OF DAILY LIVING (ADL)
BATHING: INDEPENDENT
DOING_HOUSEWORK: INDEPENDENT
DRESSING: INDEPENDENT
GROCERY_SHOPPING: INDEPENDENT
MANAGING_FINANCES: INDEPENDENT
TAKING_MEDICATION: INDEPENDENT

## 2023-08-15 ASSESSMENT — ENCOUNTER SYMPTOMS
LOSS OF SENSATION IN FEET: 0
OCCASIONAL FEELINGS OF UNSTEADINESS: 0
DEPRESSION: 0

## 2023-08-15 ASSESSMENT — PATIENT HEALTH QUESTIONNAIRE - PHQ9
2. FEELING DOWN, DEPRESSED OR HOPELESS: NOT AT ALL
1. LITTLE INTEREST OR PLEASURE IN DOING THINGS: NOT AT ALL
SUM OF ALL RESPONSES TO PHQ9 QUESTIONS 1 AND 2: 0

## 2023-08-15 NOTE — PROGRESS NOTES
Subjective   Reason for Visit: Valerie Orona is a 73 y.o. female here for a Medicare Wellness visit.   Covid vax: x 5  CRC: 2018  Mammogram: 8/2023  Pap: hysterectomy  Lmp: hysterectomy  Ldl 100  Hba1c 5.3  CHECKLIST REVIEWED AND COMPLETE FOR [facility]    Past Medical, Surgical, and Family History reviewed and updated in chart.    Reviewed all medications by prescribing practitioner or clinical pharmacist (such as prescriptions, OTCs, herbal therapies and supplements) and documented in the medical record.  Medicare Annual Wellness Visit Subsequent  HPI    Patient Self Assessment of Health Status  Patient Self Assessment: Good    Nutrition and Exercise  Current Diet: HEALTHY Diet always best, minimizing excess carbs  Adequate Fluid Intake: Yes  Caffeine: -aware to minimize intake  Exercise Frequency: Regularly advised    Functional Ability/Level of Safety  Cognitive Impairment Observed: No cognitive impairment observed    Home Safety Risk Factors: None    Patient Care Team:  Linda Kowalski MD as PCP - General    HPI  Patient Active Problem List   Diagnosis    Abdominal pain, colicky    Anemia    Arthritis    Bilateral high frequency sensorineural hearing loss    Chronic otitis externa    Current moderate episode of major depressive disorder without prior episode (CMS/HCC)    Fatigue    Generalized anxiety disorder    GERD (gastroesophageal reflux disease)    Hematuria    Oral herpes    Degenerative arthritis of thoracic spine    Other specified hypothyroidism    Prediabetes    Tinnitus of both ears    Vitamin B12 deficiency    Frequent UTI    Class 1 obesity with body mass index (BMI) of 31.0 to 31.9 in adult    Diverticulitis      Past Surgical History:   Procedure Laterality Date    BREAST SURGERY  1990    reduction    CATARACT EXTRACTION  2010    ?    CHOLECYSTECTOMY  2000    COLONOSCOPY  08/2018    suboptimal prep -tics and bowel surgery    COLOSTOMY  09/2018    COLOSTOMY CLOSURE  03/2019    TOTAL ABDOMINAL  "HYSTERECTOMY W/ BILATERAL SALPINGOOPHORECTOMY  1996    no CA       Review of Systems  This patient has   NO history of recent Covid nor flu symptoms,  NO Fever nor chills,  NO Chest pain, shortness of breath nor paroxysmal nocturnal dyspnea,  NO Nausea, vomiting, nor diarrhea,  NO Hematochezia nor melena,  NO Dysuria, hematuria, nor new incontinence issues  NO new severe headaches nor neurological complaints,  NO new issues with anxiety nor depression nor new psychiatric complaints,  NO suicidal nor homicidal ideations.     OBJECTIVE:  /64   Pulse 68   Temp 36.4 °C (97.5 °F) (Temporal)   Resp 16   Ht 1.562 m (5' 1.5\")   Wt 68.9 kg (152 lb)   LMP 01/01/1996   SpO2 98%   BMI 28.26 kg/m²      General:  alert, oriented, no acute distress.  No obvious skin rashes noted.   No gait disturbance noted.    Mood is pleasant, not tearful, no signs of emotional distress.  Not appearing intoxicated or altered.   No voiced delusions,   Normal, appropriate behavior.    HEENT: Normocephalic, atraumatic,   Pupils round, reactive to light  Extraocular motions intact and wnl  Tympanic membranes normal    Neck: no nuchal rigidity  No masses palpable.  No carotid bruits.  No thyromegaly.    Respiratory: Equal breath sounds  No wheezes,    rales,    nor rhonchi  No respiratory distress.    Heart: Regular rate and rhythm, no    murmurs  no rubs/gallops    Abdomen: no masses palpable, no rebound nor guarding, no rebound nor guarding.    Extremities: NO cyanosis noted, no clubbing.   No edema noted.  2+dorsalis pedis pulses.    Normal-not antalgic, steady gait.         Assessment/Plan     Problem List Items Addressed This Visit       Anemia    Current moderate episode of major depressive disorder without prior episode (CMS/HCC)    Generalized anxiety disorder    Relevant Medications    ALPRAZolam (Xanax) 0.5 mg tablet    Other Relevant Orders    Drug Screen, Urine With Reflex to Confirmation    Other specified hypothyroidism    " Prediabetes    Vitamin B12 deficiency     Other Visit Diagnoses       Encounter for Medicare annual wellness exam    -  Primary    Medication management        Relevant Orders    Drug Screen, Urine With Reflex to Confirmation          Advance Care Planning Note   Discussion Date: 08/15/23   Discussion Participants: patient    The patient wishes to discuss Advance Care Planning today and the following is a brief summary of our discussion.     Patient has capacity to make their own medical decisions: Yes  Health Care Agent/Surrogate Decision Maker documented in chart: Yes  Documents on file and valid:  Advance Directive/Living Will: no  Health Care Power of : no  Communication of Medical Status/Prognosis:   yes   Communication of Treatment Goals/Options:   yes  Treatment Decisions  yes  Time Statement: Total face to face time spent on advance care planning was <30 minutes with <30 minutes spent in counseling, including the explanation.    SEE ME AT NEXT REGULARLY SCHEDULED VISIT-sooner if condition deteriorates or new problems arise.  Full code desired  No depression  No dementia  Overuse and abuse potential discussed with patient (parents if applicable)  If mood deterioration, status change etc on medicine, suicidal or homicidal ideations -patient agrees to proceed with crisis plan-in place-including-not limited to contacting family, our office and or proceeding to ER.    It is recommended that [place]AR[place] reports be checked and patient have appointment at least every 3months(6 for certain medicines only)  If the agreement signed (controlled substance agreement) is violated prescriptions may be stopped abruptly and patient /family understands and agrees to this.  Another reason for termination of agreement is if we have concern for abuse or overuse and it is also recommended that patient take responsibility to try to taper and minimize use of these medicines frequently trying to limit or gradually taper to  discontinuation.    Patient is aware that side effects such as insomnia, unexpected weight changes are unexpected and should result in discontinuation.  Always use caution and AVOID operating machinery(driving etc) on pain medicines or CNS depressants and avoid combining together OR with alcohol. If opioids are prescribed patient understands the benefits of narcan and was offered prescription.  Follow up sooner if condition deteriorates or problems arise.    Agrees to regular follow and counseling for maximum benefits.  Advised rare use xanax and risks addressed  No etoh or driving on xanax etc    Labs by 6mo  Tsh ft4 soon  Reqs b12  Bx from derm today on 3rd-4th digit  Healing

## 2023-08-16 LAB
AMPHETAMINE (PRESENCE) IN URINE BY SCREEN METHOD: NORMAL
BARBITURATES PRESENCE IN URINE BY SCREEN METHOD: NORMAL
BENZODIAZEPINE (PRESENCE) IN URINE BY SCREEN METHOD: NORMAL
CANNABINOIDS IN URINE BY SCREEN METHOD: NORMAL
COCAINE (PRESENCE) IN URINE BY SCREEN METHOD: NORMAL
DRUG SCREEN COMMENT URINE: NORMAL
FENTANYL URINE: NORMAL
METHADONE (PRESENCE) IN URINE BY SCREEN METHOD: NORMAL
OPIATES (PRESENCE) IN URINE BY SCREEN METHOD: NORMAL
OXYCODONE (PRESENCE) IN URINE BY SCREEN METHOD: NORMAL
PHENCYCLIDINE (PRESENCE) IN URINE BY SCREEN METHOD: NORMAL

## 2023-09-09 ENCOUNTER — LAB (OUTPATIENT)
Dept: LAB | Facility: LAB | Age: 73
End: 2023-09-09
Payer: MEDICARE

## 2023-09-09 DIAGNOSIS — Z00.00 ENCOUNTER FOR MEDICARE ANNUAL WELLNESS EXAM: ICD-10-CM

## 2023-09-09 DIAGNOSIS — D64.9 ANEMIA, UNSPECIFIED TYPE: ICD-10-CM

## 2023-09-09 DIAGNOSIS — R73.03 PREDIABETES: ICD-10-CM

## 2023-09-09 DIAGNOSIS — Z00.00 ROUTINE GENERAL MEDICAL EXAMINATION AT HEALTH CARE FACILITY: ICD-10-CM

## 2023-09-09 DIAGNOSIS — F41.1 GENERALIZED ANXIETY DISORDER: ICD-10-CM

## 2023-09-09 DIAGNOSIS — Z79.899 MEDICATION MANAGEMENT: ICD-10-CM

## 2023-09-09 DIAGNOSIS — F32.1 CURRENT MODERATE EPISODE OF MAJOR DEPRESSIVE DISORDER WITHOUT PRIOR EPISODE (MULTI): ICD-10-CM

## 2023-09-09 DIAGNOSIS — E03.8 OTHER SPECIFIED HYPOTHYROIDISM: ICD-10-CM

## 2023-09-09 DIAGNOSIS — E53.8 VITAMIN B12 DEFICIENCY: ICD-10-CM

## 2023-09-09 LAB
COBALAMIN (VITAMIN B12) (PG/ML) IN SER/PLAS: 301 PG/ML (ref 211–911)
THYROTROPIN (MIU/L) IN SER/PLAS BY DETECTION LIMIT <= 0.05 MIU/L: 3.88 MIU/L (ref 0.44–3.98)
THYROXINE (T4) FREE (NG/DL) IN SER/PLAS: 1.21 NG/DL (ref 0.61–1.12)

## 2023-09-09 PROCEDURE — 82607 VITAMIN B-12: CPT

## 2023-09-09 PROCEDURE — 84439 ASSAY OF FREE THYROXINE: CPT

## 2023-09-09 PROCEDURE — 36415 COLL VENOUS BLD VENIPUNCTURE: CPT

## 2023-09-09 PROCEDURE — 84443 ASSAY THYROID STIM HORMONE: CPT

## 2023-09-14 ENCOUNTER — TELEPHONE (OUTPATIENT)
Dept: PRIMARY CARE | Facility: CLINIC | Age: 73
End: 2023-09-14
Payer: COMMERCIAL

## 2023-09-19 ENCOUNTER — PATIENT OUTREACH (OUTPATIENT)
Dept: PRIMARY CARE | Facility: CLINIC | Age: 73
End: 2023-09-19
Payer: COMMERCIAL

## 2023-09-19 DIAGNOSIS — H53.133 ACUTE LOSS OF VISION, BILATERAL: ICD-10-CM

## 2023-09-19 RX ORDER — ATORVASTATIN CALCIUM 40 MG/1
40 TABLET, FILM COATED ORAL DAILY
COMMUNITY
Start: 2023-09-15 | End: 2024-01-04

## 2023-09-19 RX ORDER — ASPIRIN 81 MG/1
81 TABLET ORAL DAILY
COMMUNITY
Start: 2023-09-15

## 2023-09-19 NOTE — PROGRESS NOTES
Discharge Facility:[facility]   Discharge Diagnosis:temporary bilateral vision loss,  Admission Date: 9/14/23  Discharge Date: 9/15/23    PCP Appointment Date: TBD-I am unable to make an appt due to no openings . Message sent to office staff requesting assistance. As well as encourged patient to call today to schedule.    Specialist Appointment Date: N/A  Hospital Encounter and Summary:not available at this time     See discharge assessment below for further details  I introduced myself and the TCM program to Valerie Orona. I gave my contact information for any further questions.  Engagement  Call Start Time: 0808 (9/19/2023  8:10 AM)    Medications  Medications reviewed with patient/caregiver?: Yes (9/19/2023  8:10 AM)  Is the patient having any side effects they believe may be caused by any medication additions or changes?: No (9/19/2023  8:10 AM)  Does the patient have all medications ordered at discharge?: Yes (9/19/2023  8:10 AM)  Prescription Comments: NEW -Asprin and Lipitor (9/19/2023  8:10 AM)  Is the patient taking all medications as directed (includes completed medication regime)?: Yes (9/19/2023  8:10 AM)  Care Management Interventions: Provided patient education (9/19/2023  8:10 AM)    Appointments  Does the patient have a primary care provider?: Yes (Dex HARLEY) (9/19/2023  8:10 AM)  Care Management Interventions: Advised patient to make appointment; Educated patient on importance of making appointment (patient was not able to make the one opening that I had access to on her schedule. I advised patient to call office to schedule.) (9/19/2023  8:10 AM)  Has the patient kept scheduled appointments due by today?: Yes (9/19/2023  8:10 AM)    Self Management  Has home health visited the patient within 72 hours of discharge?: Not applicable (9/19/2023  8:10 AM)    Patient Teaching  Does the patient have access to their discharge instructions?: Yes (9/19/2023  8:10 AM)  Care Management Interventions:  Reviewed instructions with patient (9/19/2023  8:10 AM)  What is the patient's perception of their health status since discharge?: Returned to baseline/stable (9/19/2023  8:10 AM)  Is the patient/caregiver able to teach back the hierarchy of who to call/visit for symptoms/problems? PCP, Specialist, Home Health nurse, Urgent Care, ED, 911: Yes (9/19/2023  8:10 AM)    Wrap Up  Call End Time: 0813 (9/19/2023  8:10 AM)

## 2023-09-25 ENCOUNTER — OFFICE VISIT (OUTPATIENT)
Dept: PRIMARY CARE | Facility: CLINIC | Age: 73
End: 2023-09-25
Payer: MEDICARE

## 2023-09-25 VITALS
RESPIRATION RATE: 16 BRPM | HEART RATE: 72 BPM | HEIGHT: 62 IN | OXYGEN SATURATION: 98 % | WEIGHT: 154 LBS | DIASTOLIC BLOOD PRESSURE: 64 MMHG | TEMPERATURE: 97.4 F | SYSTOLIC BLOOD PRESSURE: 138 MMHG | BODY MASS INDEX: 28.34 KG/M2

## 2023-09-25 DIAGNOSIS — Z09 HOSPITAL DISCHARGE FOLLOW-UP: ICD-10-CM

## 2023-09-25 DIAGNOSIS — F41.1 GENERALIZED ANXIETY DISORDER: ICD-10-CM

## 2023-09-25 DIAGNOSIS — E53.8 VITAMIN B12 DEFICIENCY: ICD-10-CM

## 2023-09-25 DIAGNOSIS — R06.02 SOB (SHORTNESS OF BREATH): ICD-10-CM

## 2023-09-25 DIAGNOSIS — R73.03 PREDIABETES: ICD-10-CM

## 2023-09-25 DIAGNOSIS — R00.2 PALPITATIONS: ICD-10-CM

## 2023-09-25 DIAGNOSIS — D64.9 ANEMIA, UNSPECIFIED TYPE: ICD-10-CM

## 2023-09-25 DIAGNOSIS — R42 DIZZY: Primary | ICD-10-CM

## 2023-09-25 DIAGNOSIS — H53.9 VISUAL CHANGES: ICD-10-CM

## 2023-09-25 DIAGNOSIS — G45.9 TIA (TRANSIENT ISCHEMIC ATTACK): ICD-10-CM

## 2023-09-25 DIAGNOSIS — E03.8 OTHER SPECIFIED HYPOTHYROIDISM: ICD-10-CM

## 2023-09-25 DIAGNOSIS — Z23 NEED FOR VACCINATION: ICD-10-CM

## 2023-09-25 DIAGNOSIS — J01.00 ACUTE NON-RECURRENT MAXILLARY SINUSITIS: ICD-10-CM

## 2023-09-25 DIAGNOSIS — R00.0 TACHYCARDIA: ICD-10-CM

## 2023-09-25 DIAGNOSIS — J34.89 SINUS PRESSURE: ICD-10-CM

## 2023-09-25 DIAGNOSIS — R07.89 OTHER CHEST PAIN: ICD-10-CM

## 2023-09-25 DIAGNOSIS — F32.1 CURRENT MODERATE EPISODE OF MAJOR DEPRESSIVE DISORDER WITHOUT PRIOR EPISODE (MULTI): ICD-10-CM

## 2023-09-25 PROCEDURE — 1036F TOBACCO NON-USER: CPT | Performed by: FAMILY MEDICINE

## 2023-09-25 PROCEDURE — 87635 SARS-COV-2 COVID-19 AMP PRB: CPT

## 2023-09-25 PROCEDURE — 99496 TRANSJ CARE MGMT HIGH F2F 7D: CPT | Performed by: FAMILY MEDICINE

## 2023-09-25 PROCEDURE — G0008 ADMIN INFLUENZA VIRUS VAC: HCPCS | Performed by: FAMILY MEDICINE

## 2023-09-25 PROCEDURE — 96372 THER/PROPH/DIAG INJ SC/IM: CPT | Performed by: FAMILY MEDICINE

## 2023-09-25 PROCEDURE — 1159F MED LIST DOCD IN RCRD: CPT | Performed by: FAMILY MEDICINE

## 2023-09-25 PROCEDURE — 90662 IIV NO PRSV INCREASED AG IM: CPT | Performed by: FAMILY MEDICINE

## 2023-09-25 PROCEDURE — 1160F RVW MEDS BY RX/DR IN RCRD: CPT | Performed by: FAMILY MEDICINE

## 2023-09-25 RX ORDER — AMOXICILLIN 875 MG/1
875 TABLET, FILM COATED ORAL 2 TIMES DAILY
Qty: 20 TABLET | Refills: 0 | Status: SHIPPED | OUTPATIENT
Start: 2023-09-25 | End: 2023-10-05

## 2023-09-25 RX ORDER — CYANOCOBALAMIN 1000 UG/ML
1000 INJECTION, SOLUTION INTRAMUSCULAR; SUBCUTANEOUS ONCE
Status: COMPLETED | OUTPATIENT
Start: 2023-09-25 | End: 2023-09-25

## 2023-09-25 RX ADMIN — CYANOCOBALAMIN 1000 MCG: 1000 INJECTION, SOLUTION INTRAMUSCULAR; SUBCUTANEOUS at 15:49

## 2023-09-25 NOTE — PROGRESS NOTES
Subjective   Patient ID: Valerie Orona is a 73 y.o. female who presents for Hospital Follow-up (Dizzy, disoriented-3 episodes in a week), Shortness of Breath, and Sinus Problem (pressure).  Covid vax:  x 5  CRC: 2018  Mammogram: 8/2023  Pap: hysterectomy  Lmp: hysterectomy  Had dizzy episodes last wk  Went to er  Scans done  Did not see ophtho  Microvasc ischemia  Never got echo  Saw neuro    HPI  Patient Active Problem List   Diagnosis   • Abdominal pain, colicky   • Anemia   • Arthritis   • Bilateral high frequency sensorineural hearing loss   • Chronic otitis externa   • Current moderate episode of major depressive disorder without prior episode (CMS/HCC)   • Fatigue   • Generalized anxiety disorder   • GERD (gastroesophageal reflux disease)   • Hematuria   • Oral herpes   • Degenerative arthritis of thoracic spine   • Other specified hypothyroidism   • Prediabetes   • Tinnitus of both ears   • Vitamin B12 deficiency   • Frequent UTI   • Class 1 obesity with body mass index (BMI) of 31.0 to 31.9 in adult   • Diverticulitis       Past Surgical History:   Procedure Laterality Date   • BREAST SURGERY  1990    reduction   • CATARACT EXTRACTION  2010    ?   • CHOLECYSTECTOMY  2000   • COLONOSCOPY  08/2018    suboptimal prep -tics and bowel surgery   • COLOSTOMY  09/2018   • COLOSTOMY CLOSURE  03/2019   • MR HEAD ANGIO WO IV CONTRAST  9/15/2023    MR HEAD ANGIO WO IV CONTRAST 9/15/2023 [facility]   • MR NECK ANGIO WO IV CONTRAST  9/15/2023    MR NECK ANGIO WO IV CONTRAST 9/15/2023 [facility]   • TOTAL ABDOMINAL HYSTERECTOMY W/ BILATERAL SALPINGOOPHORECTOMY  1996    no CA       Review of Systems no sz     This patient has   NO history of recent Covid nor flu symptoms,  NO Fever nor chills,  NO Chest pain, shortness of breath nor paroxysmal nocturnal dyspnea,  NO Nausea, vomiting, nor diarrhea,  NO Hematochezia nor melena,  NO Dysuria, hematuria, nor new incontinence issues  NO new severe headaches nor neurological  "complaints,  NO new issues with anxiety nor depression nor new psychiatric complaints,  NO suicidal nor homicidal ideations.     OBJECTIVE:  /64   Pulse 72   Temp 36.3 °C (97.4 °F) (Temporal)   Resp 16   Ht 1.562 m (5' 1.5\")   Wt 69.9 kg (154 lb)   LMP 01/01/1996   SpO2 98%   BMI 28.63 kg/m²      General:  alert, oriented, no acute distress.  No obvious skin rashes noted.   No gait disturbance noted.    Mood is pleasant, not tearful, no signs of emotional distress.  Not appearing intoxicated or altered.   No voiced delusions,   Normal, appropriate behavior.    HEENT: Normocephalic, atraumatic,   Pupils round, reactive to light  Extraocular motions intact and wnl  Tympanic membranes normal    Neck: no nuchal rigidity  No masses palpable.  No carotid bruits.  No thyromegaly.    Respiratory: Equal breath sounds  No wheezes,    rales,    nor rhonchi  No respiratory distress.    Heart: Regular rate and rhythm, no    murmurs  no rubs/gallops    Abdomen: no masses palpable, nontender, no rebound nor guarding.    Extremities: NO cyanosis noted, no clubbing.   No edema noted.  2+dorsalis pedis pulses.    Normal-not antalgic, steady gait.  Cn 2-12 GI bilat    Lab on 09/09/2023   Component Date Value Ref Range Status   • TSH 09/09/2023 3.88  0.44 - 3.98 mIU/L Final     TSH testing is performed using different testing    methodology at [facility] than at other    system [facility]. Direct result comparisons should    only be made within the same method.   • Free T4 09/09/2023 1.21 (H)  0.61 - 1.12 ng/dL Final     Thyroxine Free testing is performed using different testing    methodology at [facility] than at other    system [facility]. Direct result comparisons should    only be made within the same method.  .   Biotin can cause falsely elevated free T4 results. Patients   taking a Biotin dose of up to 10 mg/day should refrain from   taking Biotin for 24 hours before sample collection. " Patient   taking a Biotin dose of >10 mg/day should consult with their   physician or the laboratory before the blood draw.   • Vitamin B-12 09/09/2023 301  211 - 911 pg/mL Final   Office Visit on 08/15/2023   Component Date Value Ref Range Status   • DRUG SCREEN COMMENT URINE 08/15/2023 SEE BELOW   Final    Drug screen results are presumptive and should not be used to assess   compliance with prescribed medication. Definitive confirmatory drug testing   has been added to this sample for any positive screen result and will be   reported separately.   .  Toxicology screening results are reported qualitatively. The concentration   must be greater than or equal to the cutoff to be reported as positive. The   concentration at which the screening test can detect an individual drug or   metabolite varies. The absence of expected drug(s) and/or drug metabolite(s)   may indicate non-compliance, inappropriate timing of specimen collection   relative to drug administration, poor drug absorption, diluted/adulterated   urine, or limitations of testing. For medical purposes only; not valid for   forensic use.   .  Interpretive questions should be directed to the laboratory medical   directors.     • Amphetamine Screen, Urine 08/15/2023 PRESUMPTIVE NEGATIVE  NEGATIVE Final     CUTOFF LEVEL:  500 NG/ML   Cross-reactivity has been reported with high concentrations   of the following drugs: buproprion, chloroquine, chlorpromazine,   ephedrine, mephentermine, fenfluramine, phentermine,   phenylpropanolamine, pseudoephedrine, and propranolol.   • Barbiturate Screen, Urine 08/15/2023 PRESUMPTIVE NEGATIVE  NEGATIVE Final     CUTOFF LEVEL: 200 NG/ML   • BENZODIAZEPINE (PRESENCE) IN URINE* 08/15/2023 PRESUMPTIVE NEGATIVE  NEGATIVE Final     CUTOFF LEVEL: 200 NG/ML   • Cannabinoid Screen, Urine 08/15/2023 PRESUMPTIVE NEGATIVE  NEGATIVE Final     CUTOFF LEVEL: 50 NG/ML   • Cocaine Screen, Urine 08/15/2023 PRESUMPTIVE NEGATIVE  NEGATIVE  Final     CUTOFF LEVEL: 150 NG/ML   • Fentanyl, Ur 08/15/2023 PRESUMPTIVE NEGATIVE  NEGATIVE Final     CUTOFF LEVEL:  5 NG/ML   • Methadone Screen, Urine 08/15/2023 PRESUMPTIVE NEGATIVE  NEGATIVE Final     CUTOFF LEVEL: 150 NG/ML   The metabolite L-alpha-acetylmethadol (LAAM) is not   detected by this method in concentrations that would   be found in the urine of patients on LAAM therapy.   • Opiate Screen, Urine 08/15/2023 PRESUMPTIVE NEGATIVE  NEGATIVE Final     CUTOFF LEVEL: 300 NG/ML   The opiate screen does not detect fentanyl, meperidine, or    tramadol. Oxycodone is not consistently detected (refer to   Oxycodone Screen, Urine result).   • Oxycodone Screen, Ur 08/15/2023 PRESUMPTIVE NEGATIVE  NEGATIVE Final     CUTOFF LEVEL: 100 NG/ML    This test will accurately detect both oxycodone and oxymorphone.        • PCP Screen, Urine 08/15/2023 PRESUMPTIVE NEGATIVE  NEGATIVE Final     CUTOFF LEVEL:  25 NG/ML   Cross-reactivity has been reported with dextromethorphan.   Lab on 08/08/2023   Component Date Value Ref Range Status   • Glucose 08/08/2023 86  74 - 99 mg/dL Final   • Sodium 08/08/2023 141  136 - 145 mmol/L Final   • Potassium 08/08/2023 4.2  3.5 - 5.3 mmol/L Final   • Chloride 08/08/2023 105  98 - 107 mmol/L Final   • Bicarbonate 08/08/2023 28  21 - 32 mmol/L Final   • Anion Gap 08/08/2023 12  10 - 20 mmol/L Final   • Urea Nitrogen 08/08/2023 21  6 - 23 mg/dL Final   • Creatinine 08/08/2023 0.64  0.50 - 1.05 mg/dL Final   • GFR Female 08/08/2023 >90  >90 mL/min/1.73m2 Final     CALCULATIONS OF ESTIMATED GFR ARE PERFORMED   USING THE 2021 CKD-EPI STUDY REFIT EQUATION   WITHOUT THE RACE VARIABLE FOR THE IDMS-TRACEABLE   CREATININE METHODS.    https://jasn.asnjournals.org/content/early/2021/09/22/ASN.2085772072   • Calcium 08/08/2023 9.4  8.6 - 10.3 mg/dL Final   • Albumin 08/08/2023 4.4  3.4 - 5.0 g/dL Final   • Alkaline Phosphatase 08/08/2023 56  33 - 136 U/L Final   • Total Protein 08/08/2023 6.6  6.4 -  8.2 g/dL Final   • AST 08/08/2023 15  9 - 39 U/L Final   • Total Bilirubin 08/08/2023 0.4  0.0 - 1.2 mg/dL Final   • ALT (SGPT) 08/08/2023 15  7 - 45 U/L Final     Patients treated with Sulfasalazine may generate    falsely decreased results for ALT.   • Hemoglobin A1C 08/08/2023 5.3  % Final         Diagnosis of Diabetes-Adults   Non-Diabetic: < or = 5.6%   Increased risk for developing diabetes: 5.7-6.4%   Diagnostic of diabetes: > or = 6.5%  .       Monitoring of Diabetes                Age (y)     Therapeutic Goal (%)   Adults:          >18           <7.0   Pediatrics:    13-18           <7.5                   7-12           <8.0                   0- 6            7.5-8.5   American Diabetes Association. Diabetes Care 33(S1), Jan 2010.   • Estimated Average Glucose 08/08/2023 105  MG/DL Final   • Cholesterol 08/08/2023 193  0 - 199 mg/dL Final    .      AGE      DESIRABLE   BORDERLINE HIGH   HIGH     0-19 Y     0 - 169       170 - 199     >/= 200    20-24 Y     0 - 189       190 - 224     >/= 225         >24 Y     0 - 199       200 - 239     >/= 240   **All ranges are based on fasting samples. Specific   therapeutic targets will vary based on patient-specific   cardiac risk.  .   Pediatric guidelines reference:Pediatrics 2011, 128(S5).   Adult guidelines reference: NCEP ATPIII Guidelines,     AVERY 2001, 258:2486-97  .   Venipuncture immediately after or during the    administration of Metamizole may lead to falsely   low results. Testing should be performed immediately   prior to Metamizole dosing.   • HDL 08/08/2023 72.6  mg/dL Final    .      AGE      VERY LOW   LOW     NORMAL    HIGH       0-19 Y       < 35   < 40     40-45     ----    20-24 Y       ----   < 40       >45     ----      >24 Y       ----   < 40     40-60      >60  .   • Cholesterol/HDL Ratio 08/08/2023 2.7   Final    REF VALUES  DESIRABLE  < 3.4  HIGH RISK  > 5.0   • LDL 08/08/2023 100 (H)  0 - 99 mg/dL Final    .                           NEAR       BORD      AGE      DESIRABLE  OPTIMAL    HIGH     HIGH     VERY HIGH     0-19 Y     0 - 109     ---    110-129   >/= 130     ----    20-24 Y     0 - 119     ---    120-159   >/= 160     ----      >24 Y     0 -  99   100-129  130-159   160-189     >/=190  .   • VLDL 08/08/2023 20  0 - 40 mg/dL Final   • Triglycerides 08/08/2023 100  0 - 149 mg/dL Final    .      AGE      DESIRABLE   BORDERLINE HIGH   HIGH     VERY HIGH   0 D-90 D    19 - 174         ----         ----        ----  91 D- 9 Y     0 -  74        75 -  99     >/= 100      ----    10-19 Y     0 -  89        90 - 129     >/= 130      ----    20-24 Y     0 - 114       115 - 149     >/= 150      ----         >24 Y     0 - 149       150 - 199    200- 499    >/= 500  .   Venipuncture immediately after or during the    administration of Metamizole may lead to falsely   low results. Testing should be performed immediately   prior to Metamizole dosing.        Assessment/Plan     Problem List Items Addressed This Visit       Anemia    Current moderate episode of major depressive disorder without prior episode (CMS/[facility])    Generalized anxiety disorder    Other specified hypothyroidism    Prediabetes     Other Visit Diagnoses       Dizzy    -  Primary    Relevant Orders    Vascular US carotid artery duplex bilateral    Referral to Ophthalmology    Hospital discharge follow-up        Need for vaccination        Relevant Orders    Flu vaccine, quadrivalent, high-dose, preservative free, age 65y+ (FLUZONE) (Completed)    SOB (shortness of breath)        Relevant Orders    Sars-CoV-2 PCR, Symptomatic    Sinus pressure        Relevant Medications    amoxicillin (Amoxil) 875 mg tablet    Other Relevant Orders    Sars-CoV-2 PCR, Symptomatic    Visual changes        Relevant Orders    Vascular US carotid artery duplex bilateral    Referral to Ophthalmology    Acute non-recurrent maxillary sinusitis        Relevant Medications    amoxicillin (Amoxil) 875 mg tablet     Palpitations        Other chest pain        Tachycardia        TIA (transient ischemic attack)              Needs ophtho even tho no sxs now  Check carotids  Now w some sinus sxs  No neuro follow up needed per pt  On baby asa now  Amoxil  The risks, benefits, and alternatives for the antibiotic prescribed were discussed with patient as well as possible side effects. If ANY signs or symptoms of allergic reaction patient is to discontinue medicine immediately and call us or GO TO ER if tongue swelling/respiratory issues or significant effects.  It has already been determined that the benefits outweigh the risks of an antibiotic for you at this time, unless otherwise indicated.  Antibiotics are usually meant to be taken to completion as advised and it is usual course for symptoms to IMPROVE while taking-if symptoms worsen or new problems arise, we should be notified or medical evaluation should occur. Persistent side effects such as diarrhea especially after antibiotic discontinuation are unusual, and should prompt assessment and evaluation either in office or ER depending on severity. Rash, and/or other symptoms of concern should also cause evaluation.  Complete resolution of original symptoms are expected and we should be informed if this does not occur.  Return to er if worse sxs  Or if cp-pressure or sob  Feels sinus type infection now  Occ palpitations  Would see cardio  Will arrange  Er if HR>120  2mo follow up

## 2023-09-25 NOTE — LETTER
September 26, 2023     Valerie SUE Yeyo  [address] [address]      Dear Ms. Orona:    Below are the results from your recent visit:  Your labs/tests are NORMAL.   Dr saldivar :)   Resulted Orders   Sars-CoV-2 PCR, Symptomatic   Result Value Ref Range    SARS-CoV-2 Result NOT DETECTED Not Detected      Comment:      .  This assay is designed to detect the ORF1a/b and E genes of SARS-CoV-2 via   nucleic acid amplification. A Not Detected result does not preclude   2019-nCoV infection since the adequacy of sample collection and/or low viral   burden may result in presence of viral nucleic acids below the clinical   sensitivity of this test method.     Fact sheet for providers: https://www.fda.gov/media/871443/download   Fact sheet for patients: https://www.fda.gov/media/215985/download     This test has received FDA Emergency Use Authorization (EUA) and has been   verified for use by [facility] ([facility]).   This test is only authorized for the duration of time that circumstances   exist to justify the authorization of the emergency use of in vitro   diagnostic tests for the detection of SARS-CoV-2 virus and/or diagnosis of   COVID-19 infection under section 564(b)(1) of the Act, 21 U.S.C.   360bbb-3(b)(1), unless the authorization is terminated or revoked  sooner.    [facility] is certified under CLIA-88 as   qualified to perform high complexity testing. Testing is performed in the   [facility] laboratories located at [address] [place] [address].

## 2023-09-26 LAB — SARS-COV-2 RESULT: NOT DETECTED

## 2023-10-09 DIAGNOSIS — E03.8 OTHER SPECIFIED HYPOTHYROIDISM: ICD-10-CM

## 2023-10-09 RX ORDER — LEVOTHYROXINE SODIUM 125 UG/1
125 TABLET ORAL DAILY
Qty: 90 TABLET | Refills: 1 | Status: SHIPPED | OUTPATIENT
Start: 2023-10-09 | End: 2024-05-13 | Stop reason: SDUPTHER

## 2023-10-10 ENCOUNTER — PATIENT OUTREACH (OUTPATIENT)
Dept: PRIMARY CARE | Facility: CLINIC | Age: 73
End: 2023-10-10
Payer: COMMERCIAL

## 2023-10-10 NOTE — PROGRESS NOTES
"Call regarding appt. with PCP on 9/25/23 after hospitalization.  At time of outreach call the patient feels as if their condition has about the same since last visit.  Reviewed the PCP appointment with the pt and addressed any questions or concerns.  She was not able to get into cardiology until 11/14/23 which is \"not happy\" about but understands that is where they are scheduling . She does have her testing this month  on the 23rd- Holter, and CV Ultrasound Vascular and Transthoracic Echocardiogram.   I encouraged her that will get the providers more understanding.   She was not able to go to the ophthalmology appt that was offered and the place was to call her back but she never heard.   It was Rosalinda Laureano at [facility] on [address]  [phone] . She will call them to get scheduled as  Dr Kowalski recommended   "

## 2023-10-23 ENCOUNTER — ANCILLARY PROCEDURE (OUTPATIENT)
Dept: CARDIOLOGY | Facility: CLINIC | Age: 73
End: 2023-10-23
Payer: MEDICARE

## 2023-10-23 ENCOUNTER — CLINICAL SUPPORT (OUTPATIENT)
Dept: CARDIOLOGY | Facility: CLINIC | Age: 73
End: 2023-10-23
Payer: MEDICARE

## 2023-10-23 VITALS — BODY MASS INDEX: 31.07 KG/M2 | WEIGHT: 154.1 LBS | HEIGHT: 59 IN

## 2023-10-23 DIAGNOSIS — R42 DIZZY: ICD-10-CM

## 2023-10-23 DIAGNOSIS — R07.9 CHEST PAIN, UNSPECIFIED: ICD-10-CM

## 2023-10-23 DIAGNOSIS — R00.0 TACHYCARDIA: ICD-10-CM

## 2023-10-23 DIAGNOSIS — G45.9 TIA (TRANSIENT ISCHEMIC ATTACK): ICD-10-CM

## 2023-10-23 DIAGNOSIS — H53.9 VISUAL CHANGES: ICD-10-CM

## 2023-10-23 DIAGNOSIS — R06.02 SOB (SHORTNESS OF BREATH): ICD-10-CM

## 2023-10-23 DIAGNOSIS — R00.2 PALPITATIONS: ICD-10-CM

## 2023-10-23 DIAGNOSIS — R07.89 OTHER CHEST PAIN: ICD-10-CM

## 2023-10-23 DIAGNOSIS — F41.1 GENERALIZED ANXIETY DISORDER: ICD-10-CM

## 2023-10-23 PROCEDURE — 93224 XTRNL ECG REC UP TO 48 HRS: CPT | Performed by: INTERNAL MEDICINE

## 2023-10-23 PROCEDURE — 93880 EXTRACRANIAL BILAT STUDY: CPT

## 2023-10-23 PROCEDURE — 93306 TTE W/DOPPLER COMPLETE: CPT

## 2023-10-23 PROCEDURE — 93306 TTE W/DOPPLER COMPLETE: CPT | Performed by: INTERNAL MEDICINE

## 2023-10-23 PROCEDURE — 93880 EXTRACRANIAL BILAT STUDY: CPT | Performed by: INTERNAL MEDICINE

## 2023-10-30 LAB
BODY SURFACE AREA: 1.71 M2
EJECTION FRACTION APICAL 4 CHAMBER: 67.5

## 2023-11-14 ENCOUNTER — PATIENT OUTREACH (OUTPATIENT)
Dept: PRIMARY CARE | Facility: CLINIC | Age: 73
End: 2023-11-14

## 2023-11-14 ENCOUNTER — OFFICE VISIT (OUTPATIENT)
Dept: CARDIOLOGY | Facility: CLINIC | Age: 73
End: 2023-11-14
Payer: MEDICARE

## 2023-11-14 VITALS
SYSTOLIC BLOOD PRESSURE: 138 MMHG | DIASTOLIC BLOOD PRESSURE: 70 MMHG | WEIGHT: 173 LBS | HEART RATE: 68 BPM | BODY MASS INDEX: 34.88 KG/M2

## 2023-11-14 DIAGNOSIS — R00.2 PALPITATIONS: ICD-10-CM

## 2023-11-14 DIAGNOSIS — G45.9 TIA (TRANSIENT ISCHEMIC ATTACK): ICD-10-CM

## 2023-11-14 DIAGNOSIS — R42 DIZZY: ICD-10-CM

## 2023-11-14 DIAGNOSIS — E66.09 CLASS 1 OBESITY DUE TO EXCESS CALORIES WITHOUT SERIOUS COMORBIDITY WITH BODY MASS INDEX (BMI) OF 31.0 TO 31.9 IN ADULT: ICD-10-CM

## 2023-11-14 DIAGNOSIS — R73.03 PREDIABETES: Primary | ICD-10-CM

## 2023-11-14 DIAGNOSIS — R06.02 SOB (SHORTNESS OF BREATH): ICD-10-CM

## 2023-11-14 DIAGNOSIS — R00.0 TACHYCARDIA: ICD-10-CM

## 2023-11-14 DIAGNOSIS — R07.89 OTHER CHEST PAIN: ICD-10-CM

## 2023-11-14 DIAGNOSIS — K57.92 DIVERTICULITIS: ICD-10-CM

## 2023-11-14 DIAGNOSIS — E03.8 OTHER SPECIFIED HYPOTHYROIDISM: ICD-10-CM

## 2023-11-14 DIAGNOSIS — H53.9 VISUAL CHANGES: ICD-10-CM

## 2023-11-14 PROCEDURE — 1160F RVW MEDS BY RX/DR IN RCRD: CPT | Performed by: INTERNAL MEDICINE

## 2023-11-14 PROCEDURE — 1036F TOBACCO NON-USER: CPT | Performed by: INTERNAL MEDICINE

## 2023-11-14 PROCEDURE — 99204 OFFICE O/P NEW MOD 45 MIN: CPT | Performed by: INTERNAL MEDICINE

## 2023-11-14 PROCEDURE — 1159F MED LIST DOCD IN RCRD: CPT | Performed by: INTERNAL MEDICINE

## 2023-11-14 RX ORDER — LANOLIN ALCOHOL/MO/W.PET/CERES
400 CREAM (GRAM) TOPICAL DAILY
Qty: 90 TABLET | Refills: 1 | Status: SHIPPED | OUTPATIENT
Start: 2023-11-14 | End: 2024-05-22 | Stop reason: SDUPTHER

## 2023-11-14 RX ORDER — PANTOPRAZOLE SODIUM 40 MG/1
40 TABLET, DELAYED RELEASE ORAL
Qty: 90 TABLET | Refills: 0 | Status: SHIPPED | OUTPATIENT
Start: 2023-11-14 | End: 2024-01-05

## 2023-11-14 NOTE — PROGRESS NOTES
Referred by Dr. Kowalski      History Of Present Illness:    Valerie Orona is a 73 y.o. female presenting with past medical history as noted below, I have incorporated some of the diagnoses addressed at recent office visit with Dr. Kowalski.  Patient has no history of hypertension.  Blood pressure elevated in office today 140/77, recheck blood pressure standing was at target.  Over the last 2 months she reports having palpitations almost every evening, between 10 and 11 PM, while she is sitting down.  Dinner is around 6 to 6:30 PM.  She does not give clear-cut symptoms of GERD.  She is on nonsteroidals.  She has prediabetes, was placed on metformin, and lost about 30 pounds in the last 6 months.  She has constant diarrhea.  This could be related to metformin.  In addition to the palpitations, she had 2 frightening episodes of symptoms in the past several weeks.  The first episode occurred while she was driving, she thought a black curtain was dropping from above, she quickly pulled to the side of the road, she felt like she would pass out but she did not.  Does not report whether this was associated with palpitations or not.  She had another similar episode while at home, and she sought evaluation at [facility] emergency department.  Reviewed all the records pertaining to that ER visit.  Patient had extensive neurological work-up as well.  I did not see a definite diagnosis.  No known history of coronary artery disease or valvular heart disease.  Reviewed recent testing to include carotid ultrasound extensive neurological studies done through the ER, laboratory data obtained in the ER, echocardiogram.      Past Medical History:  She has a past medical history of Basal cell carcinoma (BCC) of eyelid (01/09/2014), Colostomy status (CMS/[facility]) (02/25/2019), Colovaginal fistula (09/19/2018), and History of mammogram (08/2023).      Anemia      Current moderate episode of major depressive disorder without prior  episode (CMS/[facility])     Generalized anxiety disorder     Other specified hypothyroidism     Prediabetes      Other Visit Diagnoses         Dizzy    -  Primary     Relevant Orders     Vascular US carotid artery duplex bilateral     Referral to Ophthalmology     Hospital discharge follow-up         Need for vaccination         Relevant Orders     Flu vaccine, quadrivalent, high-dose, preservative free, age 65y+ (FLUZONE) (Completed)     SOB (shortness of breath)         Relevant Orders     Sars-CoV-2 PCR, Symptomatic     Sinus pressure         Relevant Medications     amoxicillin (Amoxil) 875 mg tablet     Other Relevant Orders     Sars-CoV-2 PCR, Symptomatic     Visual changes         Relevant Orders     Vascular US carotid artery duplex bilateral     Referral to Ophthalmology     Acute non-recurrent maxillary sinusitis         Relevant Medications     amoxicillin (Amoxil) 875 mg tablet     Palpitations         Other chest pain         Tachycardia         TIA (transient ischemic attack)           Past Surgical History:  She has a past surgical history that includes Breast surgery (1990); Cataract extraction (2010); Cholecystectomy (2000); Colonoscopy (08/2018); Colostomy (09/2018); Colostomy closure (03/2019); Total abdominal hysterectomy w/ bilateral salpingoophorectomy (1996); MR angio neck wo IV contrast (9/15/2023); and MR angio head wo IV contrast (9/15/2023).      Social History:  She reports that she has never smoked. She has never used smokeless tobacco. She reports that she does not drink alcohol and does not use drugs.    Family History:  Family History   Problem Relation Name Age of Onset    Lung cancer Mother      Colon cancer Brother          Allergies:  Patient has no known allergies.    Outpatient Medications:  Current Outpatient Medications   Medication Instructions    ALPRAZolam (XANAX) 0.25 mg, oral, Nightly PRN    aspirin 81 mg, oral, Daily    atorvastatin (LIPITOR) 40 mg, oral, Daily    famciclovir  (Famvir) 500 mg tablet 3 tablets po x 1 prn    levothyroxine (SYNTHROID, LEVOXYL) 125 mcg, oral, Daily    magnesium oxide (MAG-OX) 400 mg, oral, Daily    meloxicam (Mobic) 15 mg tablet Take 1 tablet (15 mg) by mouth once daily.    metFORMIN XR (Glucophage-XR) 500 mg 24 hr tablet TAKE 1 TABLET ONCE DAILY WITH THE EVENING MEAL.    pantoprazole (PROTONIX) 40 mg, oral, Daily before breakfast, Do not crush, chew, or split.        Last Recorded Vitals:  Vitals:    11/14/23 1506 11/14/23 1603   BP: 140/77 138/70   BP Location: Right arm Left arm   Patient Position: Sitting Standing   Pulse: 68    Weight: 78.5 kg (173 lb)        Physical Exam:    GENERAL APPEARANCE: Well developed, well nourished, in no acute distress.  CHEST: Symmetric and non-tender.  INTEGUMENT: Skin warm and dry, without gross excoriationis or lesions.  HEENT: No gross abnormalities, no jugular venous distention no carotid bruit or scleral icterus  NECK: Supple, no JVD, no bruit. Thyroid not palpable. Carotid upstrokes normal.  NEURO/PSHCY: Alert and oriented x3; appropriate behavior and responses and responses, with normal balance and coordination  LUNGS: Clear to auscultation bilaterally; normal respiratory effort.  HEART: Rate and rhythm regular with no evident murmur; no gallop appreciated. There are no rubs, clicks or heaves.   ABDOMEN: Soft, nontender, no masses  or bruits.   MUSCULOSKELETAL: No obvious deformity identified  EXTREMITIES: Warm  There is no edema noted.  PERIPHERAL VASCULAR: Pulses present and equally palpable; 2+ throughout.          Last Labs:  CBC -  Lab Results   Component Value Date    WBC 5.8 09/14/2023    HGB 13.2 09/14/2023    HCT 39.3 09/14/2023    MCV 89 09/14/2023     09/14/2023       CMP -  Lab Results   Component Value Date    CALCIUM 9.0 09/14/2023    PROT 6.6 09/14/2023    ALBUMIN 4.1 09/14/2023    AST 14 09/14/2023    ALT 14 09/14/2023    ALKPHOS 54 09/14/2023    BILITOT 0.3 09/14/2023       LIPID PANEL -    Lab Results   Component Value Date    CHOL 170 09/15/2023    TRIG 92 09/15/2023    HDL 66.4 09/15/2023    CHHDL 2.6 09/15/2023    LDLF 85 09/15/2023    VLDL 18 09/15/2023       RENAL FUNCTION PANEL -   Lab Results   Component Value Date    GLUCOSE 87 09/14/2023     09/14/2023    K 3.9 09/14/2023     09/14/2023    CO2 25 09/14/2023    ANIONGAP 12 09/14/2023    BUN 22 09/14/2023    CREATININE 0.76 09/14/2023    CALCIUM 9.0 09/14/2023    ALBUMIN 4.1 09/14/2023        Lab Results   Component Value Date    HGBA1C CANCELED 09/17/2023         Assessment :    1.Palpitations, dizziness, presyncope, transient visual disturbance.  She feels like a curtain comes down from the top portion and hides the upper half of her field of vision.    1.  Increasing in frequency  2.  Usually occurring after dinner  3.  Holter monitor September 2023-sinus rhythm throughout with sinus bradycardia during sleeping hours 45 to 55 bpm rare PVCs occasional PACs 1 atrial triplet no evidence of heart block symptoms of dizziness palpitations and shortness of breath corresponded to sinus rhythm  4.  Echocardiogram October 2023-LVEF 60% abnormal pattern of LV diastolic filling normal chamber dimensions left atrial diameter 2.5 cm aortic root 3 cm LV and systolic diameter 2.7 cm left atrial volume index 16 mm per metered square trace mitral regurgitation, trace tricuspid regurgitation, RV systolic pressure not estimated.  5.  Carotid ultrasound October 2023-less than 50% stenosis bilateral proximal internal carotid artery, bilateral antegrade vertebral flow    6.  Could possibly have paroxysmal atrial fibrillation, could be precipitated by GERD, although she is not describing classic symptoms  7.  Orthostatics were checked and she is not orthostatic.    Recommendations:    1.  Metoprolol succinate 25 mg daily  2.  GI prophylaxis, Nexium 40 mg p.o. daily  3.  30-day [employer] monitor  4.  Magnesium oxide 400 mg p.o. daily  5.  Reviewed  laboratory data  6.  Coronary calcium score-she asked me if she should continue the atorvastatin which is currently at 40 mg daily, she was placed on this during hospital stay, her prior lipid profile is actually excellent, coronary calcium score will help us in this decision as well, moreover if she has elevated coronary calcium score she will need a perfusion imaging study.  7.  Follow-up after testing    Thank you for allowing us to participate in Valerie's care, please do not hesitate to call if further questions arise,  Sincerely,      Christy Burgos MD

## 2023-11-14 NOTE — PROGRESS NOTES
Call placed regarding one month post discharge follow up call.  At time of outreach call the patient feels as if their condition has improved since initial visit with PCP or specialist.  Questions or concerns regarding recovery period addressed at this time.   Reviewed any PCP or specialists progress notes/labs/radiology reports if applicable and addressed any questions or concerns.     Patient has appt with Cardiology this afternoon for her new patient appt.

## 2023-11-20 ENCOUNTER — APPOINTMENT (OUTPATIENT)
Dept: RADIOLOGY | Facility: CLINIC | Age: 73
End: 2023-11-20
Payer: MEDICARE

## 2023-11-20 ENCOUNTER — APPOINTMENT (OUTPATIENT)
Dept: CARDIOLOGY | Facility: CLINIC | Age: 73
End: 2023-11-20
Payer: MEDICARE

## 2023-11-22 ENCOUNTER — ANCILLARY PROCEDURE (OUTPATIENT)
Dept: RADIOLOGY | Facility: CLINIC | Age: 73
End: 2023-11-22
Payer: MEDICARE

## 2023-11-22 ENCOUNTER — ANCILLARY PROCEDURE (OUTPATIENT)
Dept: CARDIOLOGY | Facility: CLINIC | Age: 73
End: 2023-11-22
Payer: MEDICARE

## 2023-11-22 DIAGNOSIS — R00.2 PALPITATIONS: ICD-10-CM

## 2023-11-22 DIAGNOSIS — R42 DIZZY: ICD-10-CM

## 2023-11-22 DIAGNOSIS — R00.0 TACHYCARDIA: ICD-10-CM

## 2023-11-22 DIAGNOSIS — G45.9 TIA (TRANSIENT ISCHEMIC ATTACK): ICD-10-CM

## 2023-11-22 DIAGNOSIS — R07.89 OTHER CHEST PAIN: ICD-10-CM

## 2023-11-22 DIAGNOSIS — R06.02 SOB (SHORTNESS OF BREATH): ICD-10-CM

## 2023-11-22 DIAGNOSIS — H53.9 VISUAL CHANGES: ICD-10-CM

## 2023-11-22 PROCEDURE — 75571 CT HRT W/O DYE W/CA TEST: CPT

## 2023-11-22 PROCEDURE — 93272 ECG/REVIEW INTERPRET ONLY: CPT | Performed by: INTERNAL MEDICINE

## 2023-12-12 ENCOUNTER — APPOINTMENT (OUTPATIENT)
Dept: CARDIOLOGY | Facility: CLINIC | Age: 73
End: 2023-12-12
Payer: COMMERCIAL

## 2023-12-13 ENCOUNTER — PATIENT OUTREACH (OUTPATIENT)
Dept: PRIMARY CARE | Facility: CLINIC | Age: 73
End: 2023-12-13
Payer: COMMERCIAL

## 2023-12-13 NOTE — PROGRESS NOTES
CM called and spoke with pt to address any final questions or concerns regarding hospitalization.  Pt reports doing well and has no concerns at this time.

## 2023-12-27 ENCOUNTER — APPOINTMENT (OUTPATIENT)
Dept: CARDIOLOGY | Facility: CLINIC | Age: 73
End: 2023-12-27
Payer: COMMERCIAL

## 2024-01-04 ENCOUNTER — OFFICE VISIT (OUTPATIENT)
Dept: CARDIOLOGY | Facility: CLINIC | Age: 74
End: 2024-01-04
Payer: MEDICARE

## 2024-01-04 ENCOUNTER — TELEPHONE (OUTPATIENT)
Dept: PRIMARY CARE | Facility: CLINIC | Age: 74
End: 2024-01-04

## 2024-01-04 VITALS
WEIGHT: 162 LBS | SYSTOLIC BLOOD PRESSURE: 130 MMHG | HEIGHT: 59 IN | DIASTOLIC BLOOD PRESSURE: 66 MMHG | BODY MASS INDEX: 32.66 KG/M2

## 2024-01-04 DIAGNOSIS — R00.2 PALPITATIONS: ICD-10-CM

## 2024-01-04 DIAGNOSIS — D64.9 ANEMIA, UNSPECIFIED TYPE: ICD-10-CM

## 2024-01-04 DIAGNOSIS — R73.03 PREDIABETES: ICD-10-CM

## 2024-01-04 DIAGNOSIS — E53.8 VITAMIN B12 DEFICIENCY: ICD-10-CM

## 2024-01-04 DIAGNOSIS — Z71.2 ENCOUNTER TO DISCUSS TEST RESULTS: ICD-10-CM

## 2024-01-04 DIAGNOSIS — E03.8 OTHER SPECIFIED HYPOTHYROIDISM: ICD-10-CM

## 2024-01-04 DIAGNOSIS — I47.19 PAROXYSMAL ATRIAL TACHYCARDIA (CMS-HCC): ICD-10-CM

## 2024-01-04 PROCEDURE — 99213 OFFICE O/P EST LOW 20 MIN: CPT | Performed by: INTERNAL MEDICINE

## 2024-01-04 PROCEDURE — 1160F RVW MEDS BY RX/DR IN RCRD: CPT | Performed by: INTERNAL MEDICINE

## 2024-01-04 PROCEDURE — 1036F TOBACCO NON-USER: CPT | Performed by: INTERNAL MEDICINE

## 2024-01-04 PROCEDURE — 1159F MED LIST DOCD IN RCRD: CPT | Performed by: INTERNAL MEDICINE

## 2024-01-04 RX ORDER — KETOCONAZOLE 20 MG/ML
SHAMPOO, SUSPENSION TOPICAL 3 TIMES WEEKLY
COMMUNITY
Start: 2023-12-12 | End: 2024-05-22 | Stop reason: SDUPTHER

## 2024-01-04 NOTE — PROGRESS NOTES
Patient presents for follow-up after testing, and trial of medical therapy and to discuss test results to include coronary calcium score and 30-day [employer] monitor.  She says that she usually does not tolerate any medications.  She developed a diarrhea, which was probably related to multiple things including combination of antibiotics that she was on, magnesium oxide, and metformin.  She stopped the magnesium oxide, she also stopped the atorvastatin that was initiated by another provider, possibly when she was in the hospital?.  Says she takes the levothyroxine and the meloxicam as needed, and the Xanax as needed.  Continues to have occasional palpitations, but she reports that while she had the monitor she did not have much palpitations.    Review of her cardiac event monitor shows no atrial fibrillation, short bursts of paroxysmal supraventricular tachycardia at 138 bpm was noted, rare supraventricular and ventricular premature beats were noted.    Objective      Wt Readings from Last 3 Encounters:   01/04/24 73.5 kg (162 lb)   11/14/23 78.5 kg (173 lb)   10/23/23 69.9 kg (154 lb 1.6 oz)            Physical Exam:    GENERAL APPEARANCE: in no acute distress.  CHEST: Symmetric and non-tender.  INTEGUMENT: Skin warm and dry  HEENT: No gross abnormalities identified.No pallor or scleral icterus.  NECK: Supple, no JVD, no bruit.   NEURO/PSHCY: Alert and oriented x3; appropriate behavior and responses and responses  LUNGS: Clear to auscultation bilaterally; normal respiratory effort.  HEART: Rate and rhythm regular with no evident murmur; no gallop appreciated.   ABDOMEN: Soft, non tender.  MUSCULOSKELETAL: No gross deformities.  EXTREMITIES: Warm  There is no edema noted.    Meds:  Current Outpatient Medications   Medication Instructions    ALPRAZolam (XANAX) 0.25 mg, oral, Nightly PRN    aspirin 81 mg, oral, Daily    ketoconazole (NIZOral) 2 % shampoo Topical, 3 times weekly    levothyroxine (SYNTHROID,  LEVOXYL) 125 mcg, oral, Daily    magnesium oxide (MAG-OX) 400 mg, oral, Daily    meloxicam (Mobic) 15 mg tablet Take 1 tablet (15 mg) by mouth once daily as needed.    pantoprazole (PROTONIX) 40 mg, oral, Daily before breakfast, Do not crush, chew, or split.          No Known Allergies          LABS:    Lab Results   Component Value Date    WBC 5.8 09/14/2023    HGB 13.2 09/14/2023    HCT 39.3 09/14/2023     09/14/2023    CHOL 170 09/15/2023    TRIG 92 09/15/2023    HDL 66.4 09/15/2023    ALT 14 09/14/2023    AST 14 09/14/2023     09/14/2023    K 3.9 09/14/2023     09/14/2023    CREATININE 0.76 09/14/2023    BUN 22 09/14/2023    CO2 25 09/14/2023    TSH 2.26 09/14/2023    INR 1.1 09/14/2023    HGBA1C CANCELED 09/17/2023                   Problem List:    Patient Active Problem List    Diagnosis Date Noted    Dizzy 11/14/2023    Visual changes 11/14/2023    Palpitations 11/14/2023    Abdominal pain, colicky 02/10/2023    Anemia 02/10/2023    Arthritis 02/10/2023    Bilateral high frequency sensorineural hearing loss 02/10/2023    Chronic otitis externa 02/10/2023    Current moderate episode of major depressive disorder without prior episode (CMS/[facility]) 02/10/2023    Fatigue 02/10/2023    Generalized anxiety disorder 02/10/2023    GERD (gastroesophageal reflux disease) 02/10/2023    Hematuria 02/10/2023    Oral herpes 02/10/2023    Degenerative arthritis of thoracic spine 02/10/2023    Other specified hypothyroidism 02/10/2023    Prediabetes 02/10/2023    Tinnitus of both ears 02/10/2023    Vitamin B12 deficiency 02/10/2023    Frequent UTI 02/10/2023    Class 1 obesity with body mass index (BMI) of 31.0 to 31.9 in adult 02/10/2023    Diverticulitis 01/24/2019                 Assessment:  1.  Palpitations most likely related to paroxysmal atrial tachycardia  2.  Diarrhea most likely due to a combination of antibiotics magnesium oxide and metformin  3.  Coronary calcium score 11/22/2023-61-left main 29  LAD 28 left circumflex 3 RCA 0, 53rd percentile for age gender and race  4.  LDL of 85 HDL 66, triglycerides 92, total cholesterol 170, hemoglobin A1c 5.3-September 2023.  5.  Likes to stay on the metformin, says she has lost close to 40 pounds on this medication.  Also remains fairly active    Recommendations:  1.  I gave her the option to continue to follow symptoms versus going back on magnesium oxide versus going on low-dose metoprolol.  When I discussed the pros and cons of metoprolol which includes rarely hair loss, she did not want to try it.  She also did not want to try multiple new medications at the same time and I concur.  She will start magnesium oxide 400 mg p.o. daily.  If she develops diarrhea then she will stop.     2.  Her coronary calcium score is less than 100.  As such, I do not have a strong recommendation regarding statin therapy as long as she is not diabetic.  If she is diabetic we would aim to get the LDL to 70 or below.  Her lipid profile is actually quite good.  3.  Talked about the dynamic nature of coronary artery disease and the importance of seeking medical attention if new symptoms develop.  4.  At this time, I will have her follow me on an as-needed basis.  At some point, if palpitations worsen, trial of low-dose beta-blockers could be tried.    Thank you Dr. Kowalski for allowing me to participate in Beth's care, please do not hesitate to call if further questions arise,  Sincerely,  Christy Burgos MD [facility]    Follow up : prn.    Provider Attestation - Scribe documentation    All medical record entries made by the Scribe were at my direction and personally dictated by me. I have reviewed the chart and agree that the record accurately reflects my personal performance of the history, physical exam, discussion and plan.  Christy Burgos MD

## 2024-01-04 NOTE — TELEPHONE ENCOUNTER
Spoke w/ patient to RS her appointment and she wants to make sure her lab orders are in the system for her upcoming appointment please.     Thank you.

## 2024-01-30 DIAGNOSIS — M47.814 OSTEOARTHRITIS OF THORACIC SPINE, UNSPECIFIED SPINAL OSTEOARTHRITIS COMPLICATION STATUS: ICD-10-CM

## 2024-01-30 DIAGNOSIS — M19.90 ARTHRITIS: ICD-10-CM

## 2024-01-30 DIAGNOSIS — R73.03 PREDIABETES: ICD-10-CM

## 2024-01-30 RX ORDER — MELOXICAM 15 MG/1
15 TABLET ORAL DAILY
Qty: 90 TABLET | Refills: 0 | Status: SHIPPED | OUTPATIENT
Start: 2024-01-30

## 2024-01-30 RX ORDER — METFORMIN HYDROCHLORIDE 500 MG/1
500 TABLET, EXTENDED RELEASE ORAL
Qty: 90 TABLET | Refills: 0 | Status: SHIPPED | OUTPATIENT
Start: 2024-01-30

## 2024-02-13 ENCOUNTER — LAB (OUTPATIENT)
Dept: LAB | Facility: LAB | Age: 74
End: 2024-02-13
Payer: MEDICARE

## 2024-02-13 DIAGNOSIS — D64.9 ANEMIA, UNSPECIFIED TYPE: ICD-10-CM

## 2024-02-13 DIAGNOSIS — E53.8 VITAMIN B12 DEFICIENCY: ICD-10-CM

## 2024-02-13 DIAGNOSIS — R73.03 PREDIABETES: ICD-10-CM

## 2024-02-13 DIAGNOSIS — E03.8 OTHER SPECIFIED HYPOTHYROIDISM: ICD-10-CM

## 2024-02-13 LAB
ALBUMIN SERPL BCP-MCNC: 4.4 G/DL (ref 3.4–5)
ALP SERPL-CCNC: 60 U/L (ref 33–136)
ALT SERPL W P-5'-P-CCNC: 12 U/L (ref 7–45)
ANION GAP SERPL CALC-SCNC: 12 MMOL/L (ref 10–20)
AST SERPL W P-5'-P-CCNC: 13 U/L (ref 9–39)
BILIRUB SERPL-MCNC: 0.4 MG/DL (ref 0–1.2)
BUN SERPL-MCNC: 20 MG/DL (ref 6–23)
CALCIUM SERPL-MCNC: 9.5 MG/DL (ref 8.6–10.3)
CHLORIDE SERPL-SCNC: 103 MMOL/L (ref 98–107)
CO2 SERPL-SCNC: 29 MMOL/L (ref 21–32)
CREAT SERPL-MCNC: 0.6 MG/DL (ref 0.5–1.05)
EGFRCR SERPLBLD CKD-EPI 2021: >90 ML/MIN/1.73M*2
ERYTHROCYTE [DISTWIDTH] IN BLOOD BY AUTOMATED COUNT: 12.1 % (ref 11.5–14.5)
EST. AVERAGE GLUCOSE BLD GHB EST-MCNC: 108 MG/DL
GLUCOSE SERPL-MCNC: 98 MG/DL (ref 74–99)
HBA1C MFR BLD: 5.4 %
HCT VFR BLD AUTO: 42.6 % (ref 36–46)
HGB BLD-MCNC: 14.2 G/DL (ref 12–16)
MCH RBC QN AUTO: 29.9 PG (ref 26–34)
MCHC RBC AUTO-ENTMCNC: 33.3 G/DL (ref 32–36)
MCV RBC AUTO: 90 FL (ref 80–100)
NRBC BLD-RTO: 0 /100 WBCS (ref 0–0)
PLATELET # BLD AUTO: 233 X10*3/UL (ref 150–450)
POTASSIUM SERPL-SCNC: 4.2 MMOL/L (ref 3.5–5.3)
PROT SERPL-MCNC: 6.8 G/DL (ref 6.4–8.2)
RBC # BLD AUTO: 4.75 X10*6/UL (ref 4–5.2)
SODIUM SERPL-SCNC: 140 MMOL/L (ref 136–145)
T4 FREE SERPL-MCNC: 1.17 NG/DL (ref 0.61–1.12)
TSH SERPL-ACNC: 2.96 MIU/L (ref 0.44–3.98)
VIT B12 SERPL-MCNC: 230 PG/ML (ref 211–911)
WBC # BLD AUTO: 8.9 X10*3/UL (ref 4.4–11.3)

## 2024-02-13 PROCEDURE — 82607 VITAMIN B-12: CPT

## 2024-02-13 PROCEDURE — 84439 ASSAY OF FREE THYROXINE: CPT

## 2024-02-13 PROCEDURE — 36415 COLL VENOUS BLD VENIPUNCTURE: CPT

## 2024-02-13 PROCEDURE — 85027 COMPLETE CBC AUTOMATED: CPT

## 2024-02-13 PROCEDURE — 83036 HEMOGLOBIN GLYCOSYLATED A1C: CPT

## 2024-02-13 PROCEDURE — 84443 ASSAY THYROID STIM HORMONE: CPT

## 2024-02-13 PROCEDURE — 80053 COMPREHEN METABOLIC PANEL: CPT

## 2024-02-16 ENCOUNTER — APPOINTMENT (OUTPATIENT)
Dept: PRIMARY CARE | Facility: CLINIC | Age: 74
End: 2024-02-16
Payer: MEDICARE

## 2024-02-20 ENCOUNTER — APPOINTMENT (OUTPATIENT)
Dept: PRIMARY CARE | Facility: CLINIC | Age: 74
End: 2024-02-20
Payer: MEDICARE

## 2024-05-13 DIAGNOSIS — E03.8 OTHER SPECIFIED HYPOTHYROIDISM: ICD-10-CM

## 2024-05-16 RX ORDER — LEVOTHYROXINE SODIUM 125 UG/1
125 TABLET ORAL DAILY
Qty: 90 TABLET | Refills: 1 | Status: SHIPPED | OUTPATIENT
Start: 2024-05-16

## 2024-05-22 ENCOUNTER — OFFICE VISIT (OUTPATIENT)
Dept: PRIMARY CARE | Facility: CLINIC | Age: 74
End: 2024-05-22
Payer: MEDICARE

## 2024-05-22 VITALS
SYSTOLIC BLOOD PRESSURE: 138 MMHG | TEMPERATURE: 97.1 F | WEIGHT: 164 LBS | RESPIRATION RATE: 16 BRPM | HEART RATE: 74 BPM | HEIGHT: 59 IN | OXYGEN SATURATION: 98 % | DIASTOLIC BLOOD PRESSURE: 62 MMHG | BODY MASS INDEX: 33.06 KG/M2

## 2024-05-22 DIAGNOSIS — R00.2 PALPITATIONS: ICD-10-CM

## 2024-05-22 DIAGNOSIS — I47.19 PAROXYSMAL ATRIAL TACHYCARDIA (CMS-HCC): Primary | ICD-10-CM

## 2024-05-22 DIAGNOSIS — R07.89 OTHER CHEST PAIN: ICD-10-CM

## 2024-05-22 DIAGNOSIS — Z79.899 MEDICATION MANAGEMENT: ICD-10-CM

## 2024-05-22 DIAGNOSIS — F41.1 GENERALIZED ANXIETY DISORDER: ICD-10-CM

## 2024-05-22 DIAGNOSIS — L21.9 SEBORRHEIC DERMATITIS: ICD-10-CM

## 2024-05-22 DIAGNOSIS — R06.02 SOB (SHORTNESS OF BREATH): ICD-10-CM

## 2024-05-22 DIAGNOSIS — E03.8 OTHER SPECIFIED HYPOTHYROIDISM: ICD-10-CM

## 2024-05-22 DIAGNOSIS — R42 DIZZY: ICD-10-CM

## 2024-05-22 DIAGNOSIS — E83.42 HYPOMAGNESEMIA: ICD-10-CM

## 2024-05-22 DIAGNOSIS — E53.8 VITAMIN B12 DEFICIENCY: ICD-10-CM

## 2024-05-22 DIAGNOSIS — R00.0 TACHYCARDIA: ICD-10-CM

## 2024-05-22 DIAGNOSIS — G45.9 TIA (TRANSIENT ISCHEMIC ATTACK): ICD-10-CM

## 2024-05-22 DIAGNOSIS — R73.03 PREDIABETES: ICD-10-CM

## 2024-05-22 DIAGNOSIS — H53.9 VISUAL CHANGES: ICD-10-CM

## 2024-05-22 PROBLEM — F32.1 CURRENT MODERATE EPISODE OF MAJOR DEPRESSIVE DISORDER WITHOUT PRIOR EPISODE (MULTI): Status: RESOLVED | Noted: 2023-02-10 | Resolved: 2024-05-22

## 2024-05-22 LAB
AMPHETAMINES UR QL SCN: NORMAL
BARBITURATES UR QL SCN: NORMAL
BENZODIAZ UR QL SCN: NORMAL
BZE UR QL SCN: NORMAL
CANNABINOIDS UR QL SCN: NORMAL
FENTANYL+NORFENTANYL UR QL SCN: NORMAL
METHADONE UR QL SCN: NORMAL
OPIATES UR QL SCN: NORMAL
OXYCODONE+OXYMORPHONE UR QL SCN: NORMAL
PCP UR QL SCN: NORMAL

## 2024-05-22 PROCEDURE — 1036F TOBACCO NON-USER: CPT | Performed by: FAMILY MEDICINE

## 2024-05-22 PROCEDURE — 1159F MED LIST DOCD IN RCRD: CPT | Performed by: FAMILY MEDICINE

## 2024-05-22 PROCEDURE — 96372 THER/PROPH/DIAG INJ SC/IM: CPT | Performed by: FAMILY MEDICINE

## 2024-05-22 PROCEDURE — 99213 OFFICE O/P EST LOW 20 MIN: CPT | Performed by: FAMILY MEDICINE

## 2024-05-22 PROCEDURE — 1160F RVW MEDS BY RX/DR IN RCRD: CPT | Performed by: FAMILY MEDICINE

## 2024-05-22 PROCEDURE — 80307 DRUG TEST PRSMV CHEM ANLYZR: CPT

## 2024-05-22 RX ORDER — LANOLIN ALCOHOL/MO/W.PET/CERES
400 CREAM (GRAM) TOPICAL DAILY
Qty: 90 TABLET | Refills: 1 | Status: SHIPPED | OUTPATIENT
Start: 2024-05-22 | End: 2025-05-22

## 2024-05-22 RX ORDER — KETOCONAZOLE 20 MG/ML
SHAMPOO, SUSPENSION TOPICAL 3 TIMES WEEKLY
Qty: 60 ML | Refills: 3 | Status: SHIPPED | OUTPATIENT
Start: 2024-05-22

## 2024-05-22 RX ORDER — ALPRAZOLAM 0.5 MG/1
0.25 TABLET ORAL NIGHTLY PRN
Qty: 30 TABLET | Refills: 2 | Status: SHIPPED | OUTPATIENT
Start: 2024-05-22

## 2024-05-22 RX ORDER — CYANOCOBALAMIN 1000 UG/ML
1000 INJECTION, SOLUTION INTRAMUSCULAR; SUBCUTANEOUS ONCE
Status: COMPLETED | OUTPATIENT
Start: 2024-05-22 | End: 2024-05-22

## 2024-05-22 RX ADMIN — CYANOCOBALAMIN 1000 MCG: 1000 INJECTION, SOLUTION INTRAMUSCULAR; SUBCUTANEOUS at 15:38

## 2024-05-22 NOTE — PROGRESS NOTES
Covid vax: UTD  Flu: UTD  Pneumo: UTD  RSV: UTD  Shingles: advised    CRC: 2018  Mammogram: 8/2023  Pap: hysterectomy  Lmp: hysterectomy

## 2024-05-22 NOTE — PROGRESS NOTES
Subjective   Patient ID: Valerie Orona is a 74 y.o. female who presents for   Chief Complaint   Patient presents with    Prediabetes    Hypothyroidism    Depression    Anxiety    Med Management    Medication Question     Soul she continue magnesium     .  CRC: 2018  Mammogram: 8/2023  Pap: hysterectomy  Lmp: hysterectomy  And  csm  And check up physical today  B12 =230-a bit low-offered b12 qmo  Hba1c 5.4-good  Thyroid close to fast but ok  Ldl 85  Family stressors and home issues-moved    HPI  Patient Active Problem List   Diagnosis    Abdominal pain, colicky    Anemia    Arthritis    Bilateral high frequency sensorineural hearing loss    Chronic otitis externa    Fatigue    Generalized anxiety disorder    GERD (gastroesophageal reflux disease)    Hematuria    Oral herpes    Degenerative arthritis of thoracic spine    Other specified hypothyroidism    Prediabetes    Tinnitus of both ears    Vitamin B12 deficiency    Frequent UTI    Class 1 obesity with body mass index (BMI) of 31.0 to 31.9 in adult    Diverticulitis    Dizzy    Visual changes    Palpitations    Encounter to discuss test results    Paroxysmal atrial tachycardia (CMS-HCC)       Past Surgical History:   Procedure Laterality Date    BREAST SURGERY  1990    reduction    CATARACT EXTRACTION  2010    ?    CHOLECYSTECTOMY  2000    COLONOSCOPY  08/2018    suboptimal prep -tics and bowel surgery    COLOSTOMY  09/2018    COLOSTOMY CLOSURE  03/2019    MR HEAD ANGIO WO IV CONTRAST  9/15/2023    MR HEAD ANGIO WO IV CONTRAST 9/15/2023 [facility]    MR NECK ANGIO WO IV CONTRAST  9/15/2023    MR NECK ANGIO WO IV CONTRAST 9/15/2023 [facility]    TOTAL ABDOMINAL HYSTERECTOMY W/ BILATERAL SALPINGOOPHORECTOMY  1996    no CA       Review of Systems  This patient has  NO history of seizures/ CAD or CVA    NO history of recent Covid nor flu symptoms,  NO Fever nor chills,  NO Chest pain, shortness of breath nor paroxysmal nocturnal dyspnea,  NO Nausea, vomiting, nor  "diarrhea,  NO Hematochezia nor melena,  NO Dysuria, hematuria, nor new incontinence issues  NO new severe headaches nor neurological complaints,  NO new issues with anxiety nor depression nor new psychiatric complaints,  NO suicidal nor homicidal ideations.     OBJECTIVE:  /62   Pulse 74   Temp 36.2 °C (97.1 °F) (Temporal)   Resp 16   Ht 1.499 m (4' 11\")   Wt 74.4 kg (164 lb)   LMP 01/01/1996   SpO2 98%   BMI 33.12 kg/m²      General:  alert, oriented, no acute distress.  No obvious skin rashes noted.   No gait disturbance noted.    Mood is pleasant,  no signs of emotional distress.   Not appearing intoxicated or altered.   No voiced delusions,   Normal, appropriate behavior.    HEENT: Normocephalic, atraumatic,   Pupils round, reactive to light  Extraocular motions intact and wnl  Tympanic membranes normal    Neck: no nuchal rigidity  No masses palpable.  No carotid bruits.  No thyromegaly.    Respiratory: Equal breath sounds  No wheezes,    rales,    nor rhonchi  No respiratory distress.    Heart: Regular rate and rhythm, no    murmurs  no rubs/gallops    Abdomen: no masses palpable, nontender, no rebound nor guarding.  ovwt  Extremities: NO cyanosis noted, no clubbing.   No edema noted.  2+dorsalis pedis pulses.    Normal-not antalgic, steady gait.  Venous ins changes BLEs    No visits with results within 3 Month(s) from this visit.   Latest known visit with results is:   Lab on 02/13/2024   Component Date Value Ref Range Status    Glucose 02/13/2024 98  74 - 99 mg/dL Final    Sodium 02/13/2024 140  136 - 145 mmol/L Final    Potassium 02/13/2024 4.2  3.5 - 5.3 mmol/L Final    Chloride 02/13/2024 103  98 - 107 mmol/L Final    Bicarbonate 02/13/2024 29  21 - 32 mmol/L Final    Anion Gap 02/13/2024 12  10 - 20 mmol/L Final    Urea Nitrogen 02/13/2024 20  6 - 23 mg/dL Final    Creatinine 02/13/2024 0.60  0.50 - 1.05 mg/dL Final    eGFR 02/13/2024 >90  >60 mL/min/1.73m*2 Final    Calculations of " estimated GFR are performed using the 2021 CKD-EPI Study Refit equation without the race variable for the IDMS-Traceable creatinine methods.  https://jasn.asnjournals.org/content/early/2021/09/22/ASN.5113873821    Calcium 02/13/2024 9.5  8.6 - 10.3 mg/dL Final    Albumin 02/13/2024 4.4  3.4 - 5.0 g/dL Final    Alkaline Phosphatase 02/13/2024 60  33 - 136 U/L Final    Total Protein 02/13/2024 6.8  6.4 - 8.2 g/dL Final    AST 02/13/2024 13  9 - 39 U/L Final    Bilirubin, Total 02/13/2024 0.4  0.0 - 1.2 mg/dL Final    ALT 02/13/2024 12  7 - 45 U/L Final    Patients treated with Sulfasalazine may generate falsely decreased results for ALT.    Thyroid Stimulating Hormone 02/13/2024 2.96  0.44 - 3.98 mIU/L Final    Thyroxine, Free 02/13/2024 1.17 (H)  0.61 - 1.12 ng/dL Final    Hemoglobin A1C 02/13/2024 5.4  see below % Final    Estimated Average Glucose 02/13/2024 108  Not Established mg/dL Final    WBC 02/13/2024 8.9  4.4 - 11.3 x10*3/uL Final    nRBC 02/13/2024 0.0  0.0 - 0.0 /100 WBCs Final    RBC 02/13/2024 4.75  4.00 - 5.20 x10*6/uL Final    Hemoglobin 02/13/2024 14.2  12.0 - 16.0 g/dL Final    Hematocrit 02/13/2024 42.6  36.0 - 46.0 % Final    MCV 02/13/2024 90  80 - 100 fL Final    MCH 02/13/2024 29.9  26.0 - 34.0 pg Final    MCHC 02/13/2024 33.3  32.0 - 36.0 g/dL Final    RDW 02/13/2024 12.1  11.5 - 14.5 % Final    Platelets 02/13/2024 233  150 - 450 x10*3/uL Final    Vitamin B12 02/13/2024 230  211 - 911 pg/mL Final        Assessment/Plan     Problem List Items Addressed This Visit       Generalized anxiety disorder    Relevant Medications    ALPRAZolam (Xanax) 0.5 mg tablet    Other Relevant Orders    Follow Up In Primary Care - Medicare Annual    CBC and Auto Differential    Comprehensive Metabolic Panel    Hemoglobin A1C    Lipid Panel    Thyroid Stimulating Hormone    Thyroxine, Free    Drug Screen, Urine With Reflex to Confirmation    Other specified hypothyroidism    Relevant Orders    Follow Up In Primary  Care - Medicare Annual    CBC and Auto Differential    Comprehensive Metabolic Panel    Hemoglobin A1C    Lipid Panel    Thyroid Stimulating Hormone    Thyroxine, Free    Prediabetes    Relevant Orders    Follow Up In Primary Care - Medicare Annual    CBC and Auto Differential    Comprehensive Metabolic Panel    Hemoglobin A1C    Lipid Panel    Thyroid Stimulating Hormone    Thyroxine, Free    Vitamin B12 deficiency    Relevant Medications    cyanocobalamin (Vitamin B-12) injection 1,000 mcg (Start on 5/22/2024  3:45 PM)    Other Relevant Orders    Follow Up In Primary Care - Medicare Annual    CBC and Auto Differential    Comprehensive Metabolic Panel    Hemoglobin A1C    Lipid Panel    Thyroid Stimulating Hormone    Thyroxine, Free    Paroxysmal atrial tachycardia (CMS-HCC) - Primary    Relevant Orders    Follow Up In Primary Care - Medicare Annual    CBC and Auto Differential    Comprehensive Metabolic Panel    Hemoglobin A1C    Lipid Panel    Thyroid Stimulating Hormone    Thyroxine, Free     Other Visit Diagnoses       BMI 32.0-32.9,adult        Relevant Orders    Follow Up In Primary Care - Medicare Annual    CBC and Auto Differential    Comprehensive Metabolic Panel    Hemoglobin A1C    Lipid Panel    Thyroid Stimulating Hormone    Thyroxine, Free    Medication management        Relevant Orders    Drug Screen, Urine With Reflex to Confirmation        Some fatigue  Some subj hair loss  Add rogaine    Rare use xanax and particular rba for her d/w pt  Overuse and abuse potential discussed with patient (parents if applicable)  If mood deterioration, status change etc on medicine, suicidal or homicidal ideations -patient agrees to proceed with crisis plan-in place-including-not limited to contacting family, our office and or proceeding to ER.  Reviewed controlled substance agreement - including but not limited to the risks-benefits-alternatives to treatment with a controlled substance medication(s).  It is  recommended that [place] reports be checked and patient have appointment at least every 3months(6 for certain medicines only)  If the agreement signed (controlled substance agreement) is violated prescriptions may be stopped abruptly and patient /family understands and agrees to this.  Another reason for termination of agreement is if we have concern for abuse or overuse and it is also recommended that patient take responsibility to try to taper and minimize use of these medicines frequently trying to limit or gradually taper to discontinuation.    Patient is aware that side effects such as insomnia, unexpected weight changes are unexpected and should result in discontinuation.  Always use caution and AVOID operating machinery(driving etc) on pain medicines or CNS depressants and avoid combining together OR with alcohol. If opioids are prescribed patient understands the benefits of narcan and was offered prescription.  Follow up sooner if condition deteriorates or problems arise.    Agrees to regular follow and counseling for maximum benefits.    Follow up at next scheduled visit -as planned  Labs due by FALL visit and ordered  Sees [facility]  The patient is aware that results will be forthcoming of ALL planned labs and or tests. If no results are received on my chart or by letter within 1 - 3 weeks, the patient is aware they need to call to obtain results, as this is not usual. Also, if any new conditions arise, or current condition worsens, it is understood that sooner appointment should be made or urgent care/convenient care or emergency room treatment should be sought depending on severity. Otherwise follow up for recheck at regular intervals as we have discussed, at least yearly.  Saw derm for lip-healed now [facility]

## 2024-06-25 ENCOUNTER — APPOINTMENT (OUTPATIENT)
Dept: PRIMARY CARE | Facility: CLINIC | Age: 74
End: 2024-06-25
Payer: MEDICARE

## 2024-06-25 DIAGNOSIS — E53.8 VITAMIN B12 DEFICIENCY: ICD-10-CM

## 2024-06-25 PROCEDURE — 96372 THER/PROPH/DIAG INJ SC/IM: CPT | Performed by: FAMILY MEDICINE

## 2024-06-25 RX ORDER — CYANOCOBALAMIN 1000 UG/ML
1000 INJECTION, SOLUTION INTRAMUSCULAR; SUBCUTANEOUS
Status: SHIPPED | OUTPATIENT
Start: 2024-06-25 | End: 2025-05-27

## 2024-06-25 NOTE — PROGRESS NOTES
Patient is here today for a B12 injection which is a standing order. Administer in [place]. Patient tolerated well. Patient's last appt with PCP

## 2024-07-24 ENCOUNTER — CLINICAL SUPPORT (OUTPATIENT)
Dept: PRIMARY CARE | Facility: CLINIC | Age: 74
End: 2024-07-24
Payer: MEDICARE

## 2024-07-24 DIAGNOSIS — E53.8 VITAMIN B12 DEFICIENCY: ICD-10-CM

## 2024-07-24 PROCEDURE — 96372 THER/PROPH/DIAG INJ SC/IM: CPT | Performed by: FAMILY MEDICINE

## 2024-07-25 ENCOUNTER — APPOINTMENT (OUTPATIENT)
Dept: PRIMARY CARE | Facility: CLINIC | Age: 74
End: 2024-07-25
Payer: COMMERCIAL

## 2024-08-20 ENCOUNTER — APPOINTMENT (OUTPATIENT)
Dept: PRIMARY CARE | Facility: CLINIC | Age: 74
End: 2024-08-20
Payer: COMMERCIAL

## 2024-08-21 ENCOUNTER — APPOINTMENT (OUTPATIENT)
Dept: PRIMARY CARE | Facility: CLINIC | Age: 74
End: 2024-08-21
Payer: COMMERCIAL

## 2024-08-21 DIAGNOSIS — E53.8 VITAMIN B12 DEFICIENCY: ICD-10-CM

## 2024-08-21 PROCEDURE — 96372 THER/PROPH/DIAG INJ SC/IM: CPT | Performed by: FAMILY MEDICINE

## 2024-09-04 ENCOUNTER — LAB (OUTPATIENT)
Dept: LAB | Facility: LAB | Age: 74
End: 2024-09-04
Payer: COMMERCIAL

## 2024-09-04 DIAGNOSIS — E03.8 OTHER SPECIFIED HYPOTHYROIDISM: ICD-10-CM

## 2024-09-04 DIAGNOSIS — E83.42 HYPOMAGNESEMIA: ICD-10-CM

## 2024-09-04 DIAGNOSIS — R73.03 PREDIABETES: ICD-10-CM

## 2024-09-04 DIAGNOSIS — E53.8 VITAMIN B12 DEFICIENCY: ICD-10-CM

## 2024-09-04 DIAGNOSIS — F41.1 GENERALIZED ANXIETY DISORDER: ICD-10-CM

## 2024-09-04 DIAGNOSIS — I47.19 PAROXYSMAL ATRIAL TACHYCARDIA (CMS-HCC): ICD-10-CM

## 2024-09-04 LAB
ALBUMIN SERPL BCP-MCNC: 4.2 G/DL (ref 3.4–5)
ALP SERPL-CCNC: 60 U/L (ref 33–136)
ALT SERPL W P-5'-P-CCNC: 15 U/L (ref 7–45)
ANION GAP SERPL CALC-SCNC: 11 MMOL/L (ref 10–20)
AST SERPL W P-5'-P-CCNC: 15 U/L (ref 9–39)
BASOPHILS # BLD AUTO: 0.06 X10*3/UL (ref 0–0.1)
BASOPHILS NFR BLD AUTO: 1.3 %
BILIRUB SERPL-MCNC: 0.3 MG/DL (ref 0–1.2)
BUN SERPL-MCNC: 21 MG/DL (ref 6–23)
CALCIUM SERPL-MCNC: 9.8 MG/DL (ref 8.6–10.3)
CHLORIDE SERPL-SCNC: 106 MMOL/L (ref 98–107)
CHOLEST SERPL-MCNC: 198 MG/DL (ref 0–199)
CHOLESTEROL/HDL RATIO: 2.7
CO2 SERPL-SCNC: 29 MMOL/L (ref 21–32)
CREAT SERPL-MCNC: 0.7 MG/DL (ref 0.5–1.05)
EGFRCR SERPLBLD CKD-EPI 2021: 90 ML/MIN/1.73M*2
EOSINOPHIL # BLD AUTO: 0.12 X10*3/UL (ref 0–0.4)
EOSINOPHIL NFR BLD AUTO: 2.5 %
ERYTHROCYTE [DISTWIDTH] IN BLOOD BY AUTOMATED COUNT: 12.2 % (ref 11.5–14.5)
GLUCOSE SERPL-MCNC: 102 MG/DL (ref 74–99)
HCT VFR BLD AUTO: 41.3 % (ref 36–46)
HDLC SERPL-MCNC: 74.6 MG/DL
HGB BLD-MCNC: 13.4 G/DL (ref 12–16)
IMM GRANULOCYTES # BLD AUTO: 0.01 X10*3/UL (ref 0–0.5)
IMM GRANULOCYTES NFR BLD AUTO: 0.2 % (ref 0–0.9)
LDLC SERPL CALC-MCNC: 99 MG/DL
LYMPHOCYTES # BLD AUTO: 1.23 X10*3/UL (ref 0.8–3)
LYMPHOCYTES NFR BLD AUTO: 26.1 %
MAGNESIUM SERPL-MCNC: 2.15 MG/DL (ref 1.6–2.4)
MCH RBC QN AUTO: 29.5 PG (ref 26–34)
MCHC RBC AUTO-ENTMCNC: 32.4 G/DL (ref 32–36)
MCV RBC AUTO: 91 FL (ref 80–100)
MONOCYTES # BLD AUTO: 0.29 X10*3/UL (ref 0.05–0.8)
MONOCYTES NFR BLD AUTO: 6.1 %
NEUTROPHILS # BLD AUTO: 3.01 X10*3/UL (ref 1.6–5.5)
NEUTROPHILS NFR BLD AUTO: 63.8 %
NON HDL CHOLESTEROL: 123 MG/DL (ref 0–149)
NRBC BLD-RTO: 0 /100 WBCS (ref 0–0)
PLATELET # BLD AUTO: 227 X10*3/UL (ref 150–450)
POTASSIUM SERPL-SCNC: 4.4 MMOL/L (ref 3.5–5.3)
PROT SERPL-MCNC: 6.4 G/DL (ref 6.4–8.2)
RBC # BLD AUTO: 4.54 X10*6/UL (ref 4–5.2)
SODIUM SERPL-SCNC: 142 MMOL/L (ref 136–145)
T4 FREE SERPL-MCNC: 1.16 NG/DL (ref 0.61–1.12)
TRIGL SERPL-MCNC: 123 MG/DL (ref 0–149)
TSH SERPL-ACNC: 3.88 MIU/L (ref 0.44–3.98)
VLDL: 25 MG/DL (ref 0–40)
WBC # BLD AUTO: 4.7 X10*3/UL (ref 4.4–11.3)

## 2024-09-04 PROCEDURE — 83735 ASSAY OF MAGNESIUM: CPT

## 2024-09-04 PROCEDURE — 80053 COMPREHEN METABOLIC PANEL: CPT

## 2024-09-04 PROCEDURE — 84439 ASSAY OF FREE THYROXINE: CPT

## 2024-09-04 PROCEDURE — 80061 LIPID PANEL: CPT

## 2024-09-04 PROCEDURE — 85025 COMPLETE CBC W/AUTO DIFF WBC: CPT

## 2024-09-04 PROCEDURE — 83036 HEMOGLOBIN GLYCOSYLATED A1C: CPT

## 2024-09-04 PROCEDURE — 84443 ASSAY THYROID STIM HORMONE: CPT

## 2024-09-04 PROCEDURE — 36415 COLL VENOUS BLD VENIPUNCTURE: CPT

## 2024-09-05 LAB
EST. AVERAGE GLUCOSE BLD GHB EST-MCNC: 108 MG/DL
HBA1C MFR BLD: 5.4 %

## 2024-09-09 ENCOUNTER — HOSPITAL ENCOUNTER (OUTPATIENT)
Dept: RADIOLOGY | Facility: HOSPITAL | Age: 74
Discharge: HOME | End: 2024-09-09
Payer: MEDICARE

## 2024-09-09 ENCOUNTER — APPOINTMENT (OUTPATIENT)
Dept: PRIMARY CARE | Facility: CLINIC | Age: 74
End: 2024-09-09
Payer: COMMERCIAL

## 2024-09-09 VITALS
HEIGHT: 59 IN | RESPIRATION RATE: 16 BRPM | DIASTOLIC BLOOD PRESSURE: 58 MMHG | TEMPERATURE: 97.2 F | HEART RATE: 70 BPM | OXYGEN SATURATION: 98 % | SYSTOLIC BLOOD PRESSURE: 116 MMHG | BODY MASS INDEX: 34.27 KG/M2 | WEIGHT: 170 LBS

## 2024-09-09 DIAGNOSIS — M79.662 PAIN OF LEFT CALF: ICD-10-CM

## 2024-09-09 DIAGNOSIS — F41.1 GENERALIZED ANXIETY DISORDER: ICD-10-CM

## 2024-09-09 DIAGNOSIS — G45.9 TIA (TRANSIENT ISCHEMIC ATTACK): ICD-10-CM

## 2024-09-09 DIAGNOSIS — M75.01 ADHESIVE CAPSULITIS OF RIGHT SHOULDER: ICD-10-CM

## 2024-09-09 DIAGNOSIS — M19.90 ARTHRITIS: ICD-10-CM

## 2024-09-09 DIAGNOSIS — Z00.00 ENCOUNTER FOR MEDICARE ANNUAL WELLNESS EXAM: ICD-10-CM

## 2024-09-09 DIAGNOSIS — R00.2 PALPITATIONS: ICD-10-CM

## 2024-09-09 DIAGNOSIS — F51.01 PRIMARY INSOMNIA: ICD-10-CM

## 2024-09-09 DIAGNOSIS — E03.8 OTHER SPECIFIED HYPOTHYROIDISM: ICD-10-CM

## 2024-09-09 DIAGNOSIS — H53.9 VISUAL CHANGES: ICD-10-CM

## 2024-09-09 DIAGNOSIS — Z13.89 MULTIPHASIC SCREENING: ICD-10-CM

## 2024-09-09 DIAGNOSIS — Z23 NEED FOR VACCINATION: ICD-10-CM

## 2024-09-09 DIAGNOSIS — L21.9 SEBORRHEIC DERMATITIS: ICD-10-CM

## 2024-09-09 DIAGNOSIS — Z12.31 SCREENING MAMMOGRAM, ENCOUNTER FOR: ICD-10-CM

## 2024-09-09 DIAGNOSIS — R00.0 TACHYCARDIA: ICD-10-CM

## 2024-09-09 DIAGNOSIS — E83.42 HYPOMAGNESEMIA: ICD-10-CM

## 2024-09-09 DIAGNOSIS — H93.13 TINNITUS OF BOTH EARS: ICD-10-CM

## 2024-09-09 DIAGNOSIS — R73.03 PREDIABETES: ICD-10-CM

## 2024-09-09 DIAGNOSIS — M47.814 OSTEOARTHRITIS OF THORACIC SPINE, UNSPECIFIED SPINAL OSTEOARTHRITIS COMPLICATION STATUS: ICD-10-CM

## 2024-09-09 DIAGNOSIS — D64.9 ANEMIA, UNSPECIFIED TYPE: ICD-10-CM

## 2024-09-09 DIAGNOSIS — R42 DIZZY: ICD-10-CM

## 2024-09-09 DIAGNOSIS — R07.89 OTHER CHEST PAIN: ICD-10-CM

## 2024-09-09 DIAGNOSIS — I47.19 PAROXYSMAL ATRIAL TACHYCARDIA (CMS-HCC): ICD-10-CM

## 2024-09-09 DIAGNOSIS — R06.02 SOB (SHORTNESS OF BREATH): ICD-10-CM

## 2024-09-09 DIAGNOSIS — Z00.00 ROUTINE GENERAL MEDICAL EXAMINATION AT HEALTH CARE FACILITY: Primary | ICD-10-CM

## 2024-09-09 DIAGNOSIS — Z71.89 EARLY INTERVENTION COUNSELING: ICD-10-CM

## 2024-09-09 DIAGNOSIS — E53.8 VITAMIN B12 DEFICIENCY: ICD-10-CM

## 2024-09-09 PROCEDURE — 90662 IIV NO PRSV INCREASED AG IM: CPT | Performed by: FAMILY MEDICINE

## 2024-09-09 PROCEDURE — G0444 DEPRESSION SCREEN ANNUAL: HCPCS | Performed by: FAMILY MEDICINE

## 2024-09-09 PROCEDURE — 73030 X-RAY EXAM OF SHOULDER: CPT | Mod: RIGHT SIDE | Performed by: RADIOLOGY

## 2024-09-09 PROCEDURE — G0439 PPPS, SUBSEQ VISIT: HCPCS | Performed by: FAMILY MEDICINE

## 2024-09-09 PROCEDURE — 1170F FXNL STATUS ASSESSED: CPT | Performed by: FAMILY MEDICINE

## 2024-09-09 PROCEDURE — 1160F RVW MEDS BY RX/DR IN RCRD: CPT | Performed by: FAMILY MEDICINE

## 2024-09-09 PROCEDURE — 99214 OFFICE O/P EST MOD 30 MIN: CPT | Performed by: FAMILY MEDICINE

## 2024-09-09 PROCEDURE — G0008 ADMIN INFLUENZA VIRUS VAC: HCPCS | Performed by: FAMILY MEDICINE

## 2024-09-09 PROCEDURE — 1036F TOBACCO NON-USER: CPT | Performed by: FAMILY MEDICINE

## 2024-09-09 PROCEDURE — G0446 INTENS BEHAVE THER CARDIO DX: HCPCS | Performed by: FAMILY MEDICINE

## 2024-09-09 PROCEDURE — 1159F MED LIST DOCD IN RCRD: CPT | Performed by: FAMILY MEDICINE

## 2024-09-09 PROCEDURE — 73030 X-RAY EXAM OF SHOULDER: CPT | Mod: RT

## 2024-09-09 RX ORDER — LEVOTHYROXINE SODIUM 125 UG/1
125 TABLET ORAL DAILY
Qty: 90 TABLET | Refills: 3 | Status: SHIPPED | OUTPATIENT
Start: 2024-09-09

## 2024-09-09 RX ORDER — MELOXICAM 15 MG/1
15 TABLET ORAL DAILY
Qty: 90 TABLET | Refills: 3 | Status: SHIPPED | OUTPATIENT
Start: 2024-09-09

## 2024-09-09 RX ORDER — LANOLIN ALCOHOL/MO/W.PET/CERES
400 CREAM (GRAM) TOPICAL DAILY
Qty: 90 TABLET | Refills: 3 | Status: SHIPPED | OUTPATIENT
Start: 2024-09-09 | End: 2025-09-09

## 2024-09-09 RX ORDER — TRAZODONE HYDROCHLORIDE 50 MG/1
50 TABLET ORAL NIGHTLY PRN
Qty: 30 TABLET | Refills: 5 | Status: SHIPPED | OUTPATIENT
Start: 2024-09-09 | End: 2025-09-09

## 2024-09-09 RX ORDER — METFORMIN HYDROCHLORIDE 500 MG/1
500 TABLET, EXTENDED RELEASE ORAL
Qty: 90 TABLET | Refills: 3 | Status: SHIPPED | OUTPATIENT
Start: 2024-09-09

## 2024-09-09 RX ORDER — KETOCONAZOLE 20 MG/ML
SHAMPOO, SUSPENSION TOPICAL 3 TIMES WEEKLY
Qty: 60 ML | Refills: 3 | Status: SHIPPED | OUTPATIENT
Start: 2024-09-09

## 2024-09-09 RX ORDER — ALPRAZOLAM 0.5 MG/1
0.25 TABLET ORAL NIGHTLY PRN
Qty: 30 TABLET | Refills: 2 | Status: SHIPPED | OUTPATIENT
Start: 2024-09-09

## 2024-09-09 ASSESSMENT — PATIENT HEALTH QUESTIONNAIRE - PHQ9
1. LITTLE INTEREST OR PLEASURE IN DOING THINGS: MORE THAN HALF THE DAYS
2. FEELING DOWN, DEPRESSED OR HOPELESS: MORE THAN HALF THE DAYS
4. FEELING TIRED OR HAVING LITTLE ENERGY: MORE THAN HALF THE DAYS
7. TROUBLE CONCENTRATING ON THINGS, SUCH AS READING THE NEWSPAPER OR WATCHING TELEVISION: SEVERAL DAYS
SUM OF ALL RESPONSES TO PHQ9 QUESTIONS 1 AND 2: 4
6. FEELING BAD ABOUT YOURSELF - OR THAT YOU ARE A FAILURE OR HAVE LET YOURSELF OR YOUR FAMILY DOWN: NOT AT ALL
3. TROUBLE FALLING OR STAYING ASLEEP OR SLEEPING TOO MUCH: MORE THAN HALF THE DAYS
8. MOVING OR SPEAKING SO SLOWLY THAT OTHER PEOPLE COULD HAVE NOTICED. OR THE OPPOSITE, BEING SO FIGETY OR RESTLESS THAT YOU HAVE BEEN MOVING AROUND A LOT MORE THAN USUAL: NOT AT ALL
5. POOR APPETITE OR OVEREATING: NOT AT ALL
9. THOUGHTS THAT YOU WOULD BE BETTER OFF DEAD, OR OF HURTING YOURSELF: NOT AT ALL
10. IF YOU CHECKED OFF ANY PROBLEMS, HOW DIFFICULT HAVE THESE PROBLEMS MADE IT FOR YOU TO DO YOUR WORK, TAKE CARE OF THINGS AT HOME, OR GET ALONG WITH OTHER PEOPLE: NOT DIFFICULT AT ALL
SUM OF ALL RESPONSES TO PHQ QUESTIONS 1-9: 9

## 2024-09-09 ASSESSMENT — ACTIVITIES OF DAILY LIVING (ADL)
DRESSING: INDEPENDENT
GROCERY_SHOPPING: INDEPENDENT
DOING_HOUSEWORK: INDEPENDENT
MANAGING_FINANCES: INDEPENDENT
TAKING_MEDICATION: INDEPENDENT
BATHING: INDEPENDENT

## 2024-09-09 ASSESSMENT — ENCOUNTER SYMPTOMS
DEPRESSION: 0
OCCASIONAL FEELINGS OF UNSTEADINESS: 0
LOSS OF SENSATION IN FEET: 0

## 2024-09-09 NOTE — PROGRESS NOTES
Subjective   Reason for Visit: Valerie Orona is a 74 y.o. female here for a Medicare Wellness visit.   Covid vax: UTD  Flu: UTD  Pneumo: UTD  RSV: UTD  Shingles: advised     CRC: 2018  Mammogram: 8/2023-ordered  Pap: hysterectomy  Lmp: hysterectomy    AND CSM   Hba1c 5.4  Ldl 99  Ccs NOT bad but not 0=61  Explained  Occ sad  Phq2(-) BUT some anhedonia  Rare sharp calf pain L x few wks    CHECKLIST REVIEWED AND COMPLETE FOR [facility]  Medicare Annual Wellness Visit Subsequent, Prediabetes, Hypothyroidism, and Anxiety  ---------------------------------------------------------------------------------------------  Past Medical, Surgical, and Family History reviewed and updated in chart.    Reviewed all medications by prescribing practitioner or clinical pharmacist (such as prescriptions, OTCs, herbal therapies and supplements) and documented in the medical record.    HPI    Patient Self Assessment of Health Status  Patient Self Assessment: Good    Nutrition and Exercise:    Current Diet: HEALTHY Diet always best, minimizing excess carbs,   weight reduction advised if BMI not WNL, please maintain a NORMAL BMI 18.5-24.9    Adequate Fluid Intake: Yes  Caffeine: -aware to minimize intake, <300mg best to even take in LESS  Exercise Frequency: Regularly advised, weight bearing, strengthening, aerobic    Functional Ability/Level of Safety:  Home safety addressed: no active new concerns,   fall risk addressed  NO new hearing issues or concerns  [place] in ADLs addressed and areas of assistance if present -noted    ANY Cognitive Impairment Observed: No cognitive impairment observed    Home Safety Risk Factors: None  --------------------------------------------------------------------------------------------  Patient Care Team:  Linda Kowalski MD as PCP - General    HPI  Patient Active Problem List   Diagnosis    Abdominal pain, colicky    Anemia    Arthritis    Bilateral high frequency sensorineural hearing loss     "Chronic otitis externa    Fatigue    Generalized anxiety disorder    GERD (gastroesophageal reflux disease)    Hematuria    Oral herpes    Degenerative arthritis of thoracic spine    Other specified hypothyroidism    Prediabetes    Tinnitus of both ears    Vitamin B12 deficiency    Frequent UTI    Class 1 obesity with body mass index (BMI) of 31.0 to 31.9 in adult    Diverticulitis    Dizzy    Visual changes    Palpitations    Encounter to discuss test results    Paroxysmal atrial tachycardia (CMS-HCC)      Past Surgical History:   Procedure Laterality Date    BREAST SURGERY  1990    reduction    CATARACT EXTRACTION  2010    ?    CHOLECYSTECTOMY  2000    COLONOSCOPY  08/2018    suboptimal prep -tics and bowel surgery    COLOSTOMY  09/2018    COLOSTOMY CLOSURE  03/2019    MR HEAD ANGIO WO IV CONTRAST  9/15/2023    MR HEAD ANGIO WO IV CONTRAST 9/15/2023 [facility]    MR NECK ANGIO WO IV CONTRAST  9/15/2023    MR NECK ANGIO WO IV CONTRAST 9/15/2023 [facility]    TOTAL ABDOMINAL HYSTERECTOMY W/ BILATERAL SALPINGOOPHORECTOMY  1996    no CA         PHQ2(-) No active depressed mood or not in crisis, 5min. spent in discussion.    Review of Systems:    NO Seizures  NO CAD  NO CVA    This patient has   NO history of recent Covid nor flu symptoms,  NO Fever nor chills,  NO Chest pain, shortness of breath nor paroxysmal nocturnal dyspnea,  NO Nausea, vomiting, nor diarrhea,  NO Hematochezia nor melena,  NO Dysuria, hematuria, nor new incontinence issues  NO new severe headaches nor neurological complaints,  NO new issues with anxiety nor depression nor new psychiatric complaints,  NO suicidal nor homicidal ideations.  ---------------------------------------------------------------------------------------------   OBJECTIVE:  /58   Pulse 70   Temp 36.2 °C (97.2 °F) (Temporal)   Resp 16   Ht 1.499 m (4' 11\")   Wt 77.1 kg (170 lb)   LMP 01/01/1996   SpO2 98%   BMI 34.34 kg/m²      General:  alert, oriented, no acute " distress.  No obvious skin rashes noted.   No gait disturbance noted.    Mood is pleasant, not tearful, no signs of emotional distress.  Not appearing intoxicated or altered.   No voiced delusions,   Normal, appropriate behavior.    HEENT: Normocephalic, atraumatic,   Pupils round, reactive to light  Extraocular motions intact and wnl  Tympanic membranes normal    Neck: no nuchal rigidity  No masses palpable.  No carotid bruits.  No thyromegaly.    Respiratory: Equal breath sounds  No wheezes,    rales,    nor rhonchi  No respiratory distress.    Heart: Regular rate and rhythm, no    murmurs  no rubs/gallops    Abdomen: no masses palpable, no rebound nor guarding, no rebound nor guarding.    Extremities: NO cyanosis noted, no clubbing.   No edema noted.  2+dorsalis pedis pulses.    Normal-not antalgic, steady gait.  (-) homans  R shoulder fflex w pain abn and int rotation wnl    Lab on 09/04/2024   Component Date Value Ref Range Status    WBC 09/04/2024 4.7  4.4 - 11.3 x10*3/uL Final    nRBC 09/04/2024 0.0  0.0 - 0.0 /100 WBCs Final    RBC 09/04/2024 4.54  4.00 - 5.20 x10*6/uL Final    Hemoglobin 09/04/2024 13.4  12.0 - 16.0 g/dL Final    Hematocrit 09/04/2024 41.3  36.0 - 46.0 % Final    MCV 09/04/2024 91  80 - 100 fL Final    MCH 09/04/2024 29.5  26.0 - 34.0 pg Final    MCHC 09/04/2024 32.4  32.0 - 36.0 g/dL Final    RDW 09/04/2024 12.2  11.5 - 14.5 % Final    Platelets 09/04/2024 227  150 - 450 x10*3/uL Final    Neutrophils % 09/04/2024 63.8  40.0 - 80.0 % Final    Immature Granulocytes %, Automated 09/04/2024 0.2  0.0 - 0.9 % Final    Immature Granulocyte Count (IG) includes promyelocytes, myelocytes and metamyelocytes but does not include bands. Percent differential counts (%) should be interpreted in the context of the absolute cell counts (cells/UL).    Lymphocytes % 09/04/2024 26.1  13.0 - 44.0 % Final    Monocytes % 09/04/2024 6.1  2.0 - 10.0 % Final    Eosinophils % 09/04/2024 2.5  0.0 - 6.0 % Final     Basophils % 09/04/2024 1.3  0.0 - 2.0 % Final    Neutrophils Absolute 09/04/2024 3.01  1.60 - 5.50 x10*3/uL Final    Percent differential counts (%) should be interpreted in the context of the absolute cell counts (cells/uL).    Immature Granulocytes Absolute, Au* 09/04/2024 0.01  0.00 - 0.50 x10*3/uL Final    Lymphocytes Absolute 09/04/2024 1.23  0.80 - 3.00 x10*3/uL Final    Monocytes Absolute 09/04/2024 0.29  0.05 - 0.80 x10*3/uL Final    Eosinophils Absolute 09/04/2024 0.12  0.00 - 0.40 x10*3/uL Final    Basophils Absolute 09/04/2024 0.06  0.00 - 0.10 x10*3/uL Final    Glucose 09/04/2024 102 (H)  74 - 99 mg/dL Final    Sodium 09/04/2024 142  136 - 145 mmol/L Final    Potassium 09/04/2024 4.4  3.5 - 5.3 mmol/L Final    Chloride 09/04/2024 106  98 - 107 mmol/L Final    Bicarbonate 09/04/2024 29  21 - 32 mmol/L Final    Anion Gap 09/04/2024 11  10 - 20 mmol/L Final    Urea Nitrogen 09/04/2024 21  6 - 23 mg/dL Final    Creatinine 09/04/2024 0.70  0.50 - 1.05 mg/dL Final    eGFR 09/04/2024 90  >60 mL/min/1.73m*2 Final    Calculations of estimated GFR are performed using the 2021 CKD-EPI Study Refit equation without the race variable for the IDMS-Traceable creatinine methods.  https://jasn.asnjournals.org/content/early/2021/09/22/ASN.8238369235    Calcium 09/04/2024 9.8  8.6 - 10.3 mg/dL Final    Albumin 09/04/2024 4.2  3.4 - 5.0 g/dL Final    Alkaline Phosphatase 09/04/2024 60  33 - 136 U/L Final    Total Protein 09/04/2024 6.4  6.4 - 8.2 g/dL Final    AST 09/04/2024 15  9 - 39 U/L Final    Bilirubin, Total 09/04/2024 0.3  0.0 - 1.2 mg/dL Final    ALT 09/04/2024 15  7 - 45 U/L Final    Patients treated with Sulfasalazine may generate falsely decreased results for ALT.    Hemoglobin A1C 09/04/2024 5.4  see below % Final    Estimated Average Glucose 09/04/2024 108  Not Established mg/dL Final    Cholesterol 09/04/2024 198  0 - 199 mg/dL Final          Age      Desirable   Borderline High   High     0-19 Y     0 - 169        170 - 199     >/= 200    20-24 Y     0 - 189       190 - 224     >/= 225         >24 Y     0 - 199       200 - 239     >/= 240   **All ranges are based on fasting samples. Specific   therapeutic targets will vary based on patient-specific   cardiac risk.    Pediatric guidelines reference:Pediatrics 2011, 128(S5).Adult guidelines reference: NCEP ATPIII Guidelines,AVERY 2001, 258:2486-97    Venipuncture immediately after or during the administration of Metamizole may lead to falsely low results. Testing should be performed immediately prior to Metamizole dosing.    HDL-Cholesterol 09/04/2024 74.6  mg/dL Final      Age       Very Low   Low     Normal    High    0-19 Y    < 35      < 40     40-45     ----  20-24 Y    ----     < 40      >45      ----        >24 Y      ----     < 40     40-60      >60      Cholesterol/HDL Ratio 09/04/2024 2.7   Final      Ref Values  Desirable  < 3.4  High Risk  > 5.0    LDL Calculated 09/04/2024 99  <=99 mg/dL Final                                Near   Borderline      AGE      Desirable  Optimal    High     High     Very High     0-19 Y     0 - 109     ---    110-129   >/= 130     ----    20-24 Y     0 - 119     ---    120-159   >/= 160     ----      >24 Y     0 -  99   100-129  130-159   160-189     >/=190      VLDL 09/04/2024 25  0 - 40 mg/dL Final    Triglycerides 09/04/2024 123  0 - 149 mg/dL Final       Age         Desirable   Borderline High   High     Very High   0 D-90 D    19 - 174         ----         ----        ----  91 D- 9 Y     0 -  74        75 -  99     >/= 100      ----    10-19 Y     0 -  89        90 - 129     >/= 130      ----    20-24 Y     0 - 114       115 - 149     >/= 150      ----         >24 Y     0 - 149       150 - 199    200- 499    >/= 500    Venipuncture immediately after or during the administration of Metamizole may lead to falsely low results. Testing should be performed immediately prior to Metamizole dosing.    Non HDL Cholesterol 09/04/2024 123   0 - 149 mg/dL Final          Age       Desirable   Borderline High   High     Very High     0-19 Y     0 - 119       120 - 144     >/= 145    >/= 160    20-24 Y     0 - 149       150 - 189     >/= 190      ----         >24 Y    30 mg/dL above LDL Cholesterol goal      Thyroid Stimulating Hormone 09/04/2024 3.88  0.44 - 3.98 mIU/L Final    Thyroxine, Free 09/04/2024 1.16 (H)  0.61 - 1.12 ng/dL Final    Magnesium 09/04/2024 2.15  1.60 - 2.40 mg/dL Final        Assessment/Plan     Problem List Items Addressed This Visit       Anemia    Relevant Orders    CBC and Auto Differential    Comprehensive Metabolic Panel    Hemoglobin A1C    Lipid Panel    Arthritis    Relevant Medications    meloxicam (Mobic) 15 mg tablet    Other Relevant Orders    CBC and Auto Differential    Comprehensive Metabolic Panel    Hemoglobin A1C    Lipid Panel    Generalized anxiety disorder    Relevant Medications    ALPRAZolam (Xanax) 0.5 mg tablet    Other Relevant Orders    Follow Up In Primary Care    CBC and Auto Differential    Comprehensive Metabolic Panel    Hemoglobin A1C    Lipid Panel    Degenerative arthritis of thoracic spine    Relevant Medications    meloxicam (Mobic) 15 mg tablet    Other Relevant Orders    CBC and Auto Differential    Comprehensive Metabolic Panel    Hemoglobin A1C    Lipid Panel    Other specified hypothyroidism    Relevant Medications    levothyroxine (Synthroid, Levoxyl) 125 mcg tablet    Other Relevant Orders    CBC and Auto Differential    Comprehensive Metabolic Panel    Hemoglobin A1C    Lipid Panel    Prediabetes    Relevant Medications    metFORMIN  mg 24 hr tablet    Other Relevant Orders    CBC and Auto Differential    Comprehensive Metabolic Panel    Hemoglobin A1C    Lipid Panel    Tinnitus of both ears    Relevant Orders    Referral to Audiology    Vitamin B12 deficiency    Relevant Orders    CBC and Auto Differential    Comprehensive Metabolic Panel    Hemoglobin A1C    Lipid Panel    Dizzy     Relevant Medications    magnesium oxide (Mag-Ox) 400 mg (241.3 mg magnesium) tablet    Other Relevant Orders    CBC and Auto Differential    Comprehensive Metabolic Panel    Hemoglobin A1C    Lipid Panel    Visual changes    Relevant Medications    magnesium oxide (Mag-Ox) 400 mg (241.3 mg magnesium) tablet    Other Relevant Orders    CBC and Auto Differential    Comprehensive Metabolic Panel    Hemoglobin A1C    Lipid Panel    Palpitations    Relevant Medications    magnesium oxide (Mag-Ox) 400 mg (241.3 mg magnesium) tablet    Other Relevant Orders    CBC and Auto Differential    Comprehensive Metabolic Panel    Hemoglobin A1C    Lipid Panel    Paroxysmal atrial tachycardia (CMS-HCC)    Relevant Orders    CBC and Auto Differential    Comprehensive Metabolic Panel    Hemoglobin A1C    Lipid Panel     Other Visit Diagnoses       Routine general medical examination at [facility]    -  Primary    Relevant Orders    CBC and Auto Differential    Comprehensive Metabolic Panel    Hemoglobin A1C    Lipid Panel    Encounter for Medicare annual wellness exam        Relevant Orders    CBC and Auto Differential    Comprehensive Metabolic Panel    Hemoglobin A1C    Lipid Panel    BMI 32.0-32.9,adult        Relevant Orders    CBC and Auto Differential    Comprehensive Metabolic Panel    Hemoglobin A1C    Lipid Panel    Screening mammogram, encounter for        Relevant Orders    BI mammo bilateral screening tomosynthesis    CBC and Auto Differential    Comprehensive Metabolic Panel    Hemoglobin A1C    Lipid Panel    Hypomagnesemia        Relevant Medications    magnesium oxide (Mag-Ox) 400 mg (241.3 mg magnesium) tablet    Other Relevant Orders    CBC and Auto Differential    Comprehensive Metabolic Panel    Hemoglobin A1C    Lipid Panel    SOB (shortness of breath)        Relevant Medications    magnesium oxide (Mag-Ox) 400 mg (241.3 mg magnesium) tablet    Other Relevant Orders    CBC and Auto Differential     Comprehensive Metabolic Panel    Hemoglobin A1C    Lipid Panel    Other chest pain        Relevant Medications    magnesium oxide (Mag-Ox) 400 mg (241.3 mg magnesium) tablet    Other Relevant Orders    CBC and Auto Differential    Comprehensive Metabolic Panel    Hemoglobin A1C    Lipid Panel    Tachycardia        Relevant Medications    magnesium oxide (Mag-Ox) 400 mg (241.3 mg magnesium) tablet    Other Relevant Orders    CBC and Auto Differential    Comprehensive Metabolic Panel    Hemoglobin A1C    Lipid Panel    TIA (transient ischemic attack)        Relevant Medications    magnesium oxide (Mag-Ox) 400 mg (241.3 mg magnesium) tablet    Other Relevant Orders    CBC and Auto Differential    Comprehensive Metabolic Panel    Hemoglobin A1C    Lipid Panel    Seborrheic dermatitis        Relevant Medications    ketoconazole (NIZOral) 2 % shampoo    Other Relevant Orders    CBC and Auto Differential    Comprehensive Metabolic Panel    Hemoglobin A1C    Lipid Panel    Need for vaccination        Relevant Orders    Flu vaccine, trivalent, preservative free, HIGH-DOSE, age 65y+ (Fluzone) (Completed)    CBC and Auto Differential    Comprehensive Metabolic Panel    Hemoglobin A1C    Lipid Panel    Primary insomnia        Relevant Medications    traZODone (Desyrel) 50 mg tablet    Pain of left calf        Relevant Orders    Lower extremity venous duplex left    Referral to Physical Therapy    Adhesive capsulitis of right shoulder        Relevant Orders    XR shoulder right 2+ views    Referral to Physical Therapy          Advance Care Planning Note   Discussion Date: 09/09/24   Discussion Participants: patient  16 min spent discussing ACP w pt    The patient wishes to discuss Advance Care Planning today and the following is a brief summary of our discussion.     Patient has capacity to make their own medical decisions: Yes  Health Care Agent/Surrogate Decision Maker documented in chart: Yes    Documents on file and  valid:  Advance Directive/Living Will: Yes or advised today  Health Care Power of : Yes or advised today  Communication of Medical Status/Prognosis:   yes   Communication of Treatment Goals/Options:   yes  Treatment Decisions/involved with patient today  yes  Time Statement: Total face to face time spent on advance care planning was <30 minutes with <30 minutes spent in counseling, including the explanation.    SEE ME AT NEXT REGULARLY SCHEDULED VISIT-sooner if condition deteriorates or new problems arise.    PATIENT WOULD LIKE TO BE A FULL CODE    NO uncontrolled DEPRESSION noted  Assessed and reviewed for opioid use.  NO EVIDENCE OF SIGNIFICANT DEMENTIA    Signs and symptoms of concern with depression-if in crisis -(no current HI/SI) will let us know and proceed to ER   Signs/symptoms of concern with dementia-and need to contact us if they occur discussed.    ASCVD  20.6 %   addressed and risk reduction conversation took place  Offered CARDIOLOGY APPT-already sees  Statin discussed w dr bar[place]ona and pt      All above counseling 15 minutes in conversation/documentation etc    Mood counseling 5min- no uncontrolled depression, good support system, has crisis plan if occurs.  Included BMI counseling and options if BMI>30        QUESTIONS answered  Low cho diet  6mo labs then consider statin then  Wasn't on metformin-needs to resume  Rba addressed  Overuse and abuse potential discussed with patient (parents if applicable)  If mood deterioration, status change etc on medicine, suicidal or homicidal ideations -patient agrees to proceed with crisis plan-in place-including-not limited to contacting family, our office and or proceeding to ER.  Reviewed controlled substance agreement - including but not limited to the risks-benefits-alternatives to treatment with a controlled substance medication(s).  It is recommended that [place] reports be checked and patient have appointment at least every 3months(6 for certain  medicines only)  If the agreement signed (controlled substance agreement) is violated prescriptions may be stopped abruptly and patient /family understands and agrees to this.  Another reason for termination of agreement is if we have concern for abuse or overuse and it is also recommended that patient take responsibility to try to taper and minimize use of these medicines frequently trying to limit or gradually taper to discontinuation.    Patient is aware that side effects such as insomnia, unexpected weight changes are unexpected and should result in discontinuation.  Always use caution and AVOID operating machinery(driving etc) on pain medicines or CNS depressants and avoid combining together OR with alcohol. If opioids are prescribed patient understands the benefits of narcan and was offered prescription.  Follow up sooner if condition deteriorates or problems arise.  Reviewed controlled substances agreement including but not limited to the benefits-risks and alternatives to treatment with a controlled substance medication.    Agrees to regular follow and counseling for maximum benefits.  Is aware of risks xanax-dementia-falls etc  Wants to convert to NON controlled  Add trazodone at bedtime This medications risks, benefits, and alternatives were discussed with patient at length.  If any unwanted side effects occur-discontinue medicine and call the office for discussion.    Rule out dvt  Make sure K and mg -on supplement  Duplex  Also for shoulder MAY need ortho   PT first  And check xr  The patient is aware that results will be forthcoming of ALL planned labs and or tests. If no results are received on my chart or by letter within 1 - 3 weeks, the patient is aware they need to call to obtain results, as this is not usual. Also, if any new conditions arise, or current condition worsens, it is understood that sooner appointment should be made or urgent care/convenient care or emergency room treatment should be  sought depending on severity. Otherwise follow up for recheck at regular intervals as we have discussed, at least yearly.      Next visit addressed -regular visit as scheduled and follow up sooner if condition deterioration or new problems arise.    This completes Valerie Orona ANNUAL MEDICARE WELLNESS VISIT today.  If no other follow ups discussed: Please follow up in 1 year for NEXT ANNUAL MEDICARE WELLNESS VISIT.    Linda Kowalski MD    This documentation is subject to inadvertent typing and other similar clerical/grammatic errors etc.

## 2024-09-09 NOTE — PROGRESS NOTES
Covid vax: UTD  Flu: UTD  Pneumo: UTD  RSV: UTD  Shingles: advised    CRC: 2018  Mammogram: 8/2023-ordered  Pap: hysterectomy  Lmp: hysterectomy

## 2024-09-09 NOTE — PATIENT INSTRUCTIONS
Shingles vaccine is recommended. You can get this from a local pharmacy.  6mo appt and labs first

## 2024-09-23 ENCOUNTER — APPOINTMENT (OUTPATIENT)
Dept: PRIMARY CARE | Facility: CLINIC | Age: 74
End: 2024-09-23
Payer: COMMERCIAL

## 2024-09-23 ENCOUNTER — APPOINTMENT (OUTPATIENT)
Dept: RADIOLOGY | Facility: HOSPITAL | Age: 74
End: 2024-09-23
Payer: MEDICARE

## 2024-09-23 ENCOUNTER — HOSPITAL ENCOUNTER (OUTPATIENT)
Dept: RADIOLOGY | Facility: HOSPITAL | Age: 74
Discharge: HOME | End: 2024-09-23
Payer: MEDICARE

## 2024-09-23 VITALS — BODY MASS INDEX: 33.33 KG/M2 | WEIGHT: 165 LBS

## 2024-09-23 DIAGNOSIS — E53.8 VITAMIN B12 DEFICIENCY: ICD-10-CM

## 2024-09-23 DIAGNOSIS — Z12.31 SCREENING MAMMOGRAM, ENCOUNTER FOR: ICD-10-CM

## 2024-09-23 PROCEDURE — 96372 THER/PROPH/DIAG INJ SC/IM: CPT | Performed by: FAMILY MEDICINE

## 2024-09-23 PROCEDURE — 77063 BREAST TOMOSYNTHESIS BI: CPT | Performed by: RADIOLOGY

## 2024-09-23 PROCEDURE — 77067 SCR MAMMO BI INCL CAD: CPT

## 2024-09-23 PROCEDURE — 77067 SCR MAMMO BI INCL CAD: CPT | Performed by: RADIOLOGY

## 2024-09-24 ENCOUNTER — APPOINTMENT (OUTPATIENT)
Dept: RADIOLOGY | Facility: HOSPITAL | Age: 74
End: 2024-09-24
Payer: COMMERCIAL

## 2024-09-26 ENCOUNTER — APPOINTMENT (OUTPATIENT)
Dept: RADIOLOGY | Facility: HOSPITAL | Age: 74
End: 2024-09-26
Payer: MEDICARE

## 2024-10-17 ENCOUNTER — APPOINTMENT (OUTPATIENT)
Dept: AUDIOLOGY | Facility: CLINIC | Age: 74
End: 2024-10-17
Payer: MEDICARE

## 2024-10-21 ENCOUNTER — APPOINTMENT (OUTPATIENT)
Dept: PRIMARY CARE | Facility: CLINIC | Age: 74
End: 2024-10-21
Payer: MEDICARE

## 2024-10-24 ENCOUNTER — CLINICAL SUPPORT (OUTPATIENT)
Dept: PRIMARY CARE | Facility: CLINIC | Age: 74
End: 2024-10-24
Payer: MEDICARE

## 2024-10-24 DIAGNOSIS — E53.8 VITAMIN B12 DEFICIENCY: ICD-10-CM

## 2024-10-24 PROCEDURE — 96372 THER/PROPH/DIAG INJ SC/IM: CPT | Performed by: FAMILY MEDICINE

## 2024-11-12 ENCOUNTER — APPOINTMENT (OUTPATIENT)
Dept: RADIOLOGY | Facility: HOSPITAL | Age: 74
End: 2024-11-12
Payer: MEDICARE

## 2024-11-13 ENCOUNTER — HOSPITAL ENCOUNTER (OUTPATIENT)
Dept: RADIOLOGY | Facility: HOSPITAL | Age: 74
Discharge: HOME | End: 2024-11-13
Payer: MEDICARE

## 2024-11-13 DIAGNOSIS — M79.662 PAIN OF LEFT CALF: ICD-10-CM

## 2024-11-13 PROCEDURE — 93971 EXTREMITY STUDY: CPT | Performed by: RADIOLOGY

## 2024-11-13 PROCEDURE — 93971 EXTREMITY STUDY: CPT

## 2024-11-25 ENCOUNTER — APPOINTMENT (OUTPATIENT)
Dept: PRIMARY CARE | Facility: CLINIC | Age: 74
End: 2024-11-25
Payer: MEDICARE

## 2024-12-03 DIAGNOSIS — L21.9 SEBORRHEIC DERMATITIS: ICD-10-CM

## 2024-12-03 RX ORDER — KETOCONAZOLE 20 MG/ML
SHAMPOO, SUSPENSION TOPICAL 3 TIMES WEEKLY
Qty: 120 ML | Refills: 3 | Status: SHIPPED | OUTPATIENT
Start: 2024-12-04

## 2025-01-06 ENCOUNTER — APPOINTMENT (OUTPATIENT)
Dept: PRIMARY CARE | Facility: CLINIC | Age: 75
End: 2025-01-06
Payer: COMMERCIAL

## 2025-01-25 ENCOUNTER — OFFICE VISIT (OUTPATIENT)
Dept: URGENT CARE | Age: 75
End: 2025-01-25
Payer: MEDICARE

## 2025-01-25 VITALS
TEMPERATURE: 98.3 F | BODY MASS INDEX: 32.39 KG/M2 | HEART RATE: 100 BPM | WEIGHT: 165 LBS | DIASTOLIC BLOOD PRESSURE: 66 MMHG | SYSTOLIC BLOOD PRESSURE: 104 MMHG | HEIGHT: 60 IN | OXYGEN SATURATION: 95 %

## 2025-01-25 DIAGNOSIS — J10.1 INFLUENZA A: ICD-10-CM

## 2025-01-25 LAB
POC RAPID INFLUENZA A: POSITIVE
POC RAPID INFLUENZA B: NEGATIVE
POC SARS-COV-2 AG BINAX: NORMAL

## 2025-01-25 RX ORDER — BROMPHENIRAMINE MALEATE, PSEUDOEPHEDRINE HYDROCHLORIDE, AND DEXTROMETHORPHAN HYDROBROMIDE 2; 30; 10 MG/5ML; MG/5ML; MG/5ML
5 SYRUP ORAL EVERY 4 HOURS PRN
Qty: 120 ML | Refills: 0 | Status: SHIPPED | OUTPATIENT
Start: 2025-01-25

## 2025-01-25 RX ORDER — METHYLPREDNISOLONE 4 MG/1
TABLET ORAL
Qty: 21 TABLET | Refills: 0 | Status: SHIPPED | OUTPATIENT
Start: 2025-01-25

## 2025-01-25 RX ORDER — OSELTAMIVIR PHOSPHATE 75 MG/1
75 CAPSULE ORAL EVERY 12 HOURS
Qty: 10 CAPSULE | Refills: 0 | Status: SHIPPED | OUTPATIENT
Start: 2025-01-25 | End: 2025-01-30

## 2025-01-25 NOTE — PROGRESS NOTES
Subjective   Patient ID: Valerie Orona is a 75 y.o. female who presents for Headache, Cough (Chest congestion ), Sore Throat (Can barley swallow. ), Other (Sx started 1/24), Generalized Body Aches, and Chills.  HPI  Presnets for evaluation fever, cough, sore throat, nausea, body aches, and HA.  Symptoms presented 1-2 days pta and have been refractory to otc meds.  Throat pain is exacerbated by oral intake.  No abdominal pain, diaphoresis, or other constitutional S/S.  No other attempt at conservative managemnet.  No other complaints.       Review of Systems    Constitutional:  See HPI   ENT: See HPI  Respiratory: See HPI  Neurologic:  Alert and oriented X4, No numbness, No tingling.    All other systems are negative     Objective     /66 (BP Location: Left arm, Patient Position: Sitting, BP Cuff Size: Adult)   Pulse 100   Temp 36.8 °C (98.3 °F) (Oral)   Ht 1.524 m (5')   Wt 74.8 kg (165 lb)   LMP 01/01/1996   SpO2 95%   BMI 32.22 kg/m²     Physical Exam    General:  Alert and oriented, No acute distress.    Eye:  Pupils are equal, round and reactive to light, Normal conjunctiva.    HENT:  Normocephalic, unremarkable oropharynx; no cervical adenopathy; nasal congestion noted  Neck:  Supple    Respiratory: Respirations are non-labored; bilateral scattered wheezing and rhonchi; no rales  Musculoskeletal: Normal ROM and strength  Integumentary:  Warm, Dry, Intact, No pallor, No rash.    Neurologic:  Alert, Oriented, Normal sensory, Cranial Nerves II-XII are grossly intact  Psychiatric:  Cooperative, Appropriate mood & affect.    Assessment/Plan   Exam is consistent with upper respiratory infection with mild early bronchitis.  Patient tested positive for flu A.  Prescription for Tamiflu, Medrol, and Bromfed.  Rest and supportive care.  Patient's clinical presentation is otherwise unremarkable at this time. Patient is discharged with instructions to follow-up with primary care or seek emergency medical  attention for worsening symptoms or any new concerns.  Problem List Items Addressed This Visit    None  Visit Diagnoses       Influenza A        Relevant Medications    oseltamivir (Tamiflu) 75 mg capsule    brompheniramine-pseudoeph-DM (Bromfed DM) 2-30-10 mg/5 mL syrup    methylPREDNISolone (Medrol Dospak) 4 mg tablets    Other Relevant Orders    POCT Influenza A/B manually resulted (Completed)    POCT Covid-19 Rapid Antigen (Completed)            Final diagnoses:   [J10.1] Influenza A

## 2025-02-04 ENCOUNTER — OFFICE VISIT (OUTPATIENT)
Dept: PRIMARY CARE | Facility: CLINIC | Age: 75
End: 2025-02-04
Payer: MEDICARE

## 2025-02-04 ENCOUNTER — HOSPITAL ENCOUNTER (OUTPATIENT)
Dept: RADIOLOGY | Facility: HOSPITAL | Age: 75
Discharge: HOME | End: 2025-02-04
Payer: MEDICARE

## 2025-02-04 VITALS
OXYGEN SATURATION: 99 % | BODY MASS INDEX: 32.98 KG/M2 | HEIGHT: 60 IN | TEMPERATURE: 97.1 F | HEART RATE: 72 BPM | SYSTOLIC BLOOD PRESSURE: 138 MMHG | WEIGHT: 168 LBS | DIASTOLIC BLOOD PRESSURE: 84 MMHG

## 2025-02-04 DIAGNOSIS — R05.1 ACUTE COUGH: ICD-10-CM

## 2025-02-04 DIAGNOSIS — R39.9 UTI SYMPTOMS: ICD-10-CM

## 2025-02-04 DIAGNOSIS — N39.0 URINARY TRACT INFECTION WITH HEMATURIA, SITE UNSPECIFIED: ICD-10-CM

## 2025-02-04 DIAGNOSIS — R31.9 URINARY TRACT INFECTION WITH HEMATURIA, SITE UNSPECIFIED: ICD-10-CM

## 2025-02-04 DIAGNOSIS — R05.1 ACUTE COUGH: Primary | ICD-10-CM

## 2025-02-04 DIAGNOSIS — R06.2 WHEEZES: ICD-10-CM

## 2025-02-04 LAB
POC APPEARANCE, URINE: ABNORMAL
POC BILIRUBIN, URINE: NEGATIVE
POC BLOOD, URINE: ABNORMAL
POC COLOR, URINE: YELLOW
POC GLUCOSE, URINE: NEGATIVE MG/DL
POC KETONES, URINE: NEGATIVE MG/DL
POC LEUKOCYTES, URINE: ABNORMAL
POC NITRITE,URINE: NEGATIVE
POC PH, URINE: 5.5 PH
POC PROTEIN, URINE: NEGATIVE MG/DL
POC SPECIFIC GRAVITY, URINE: 1.02
POC UROBILINOGEN, URINE: 0.2 EU/DL

## 2025-02-04 PROCEDURE — 1160F RVW MEDS BY RX/DR IN RCRD: CPT | Performed by: PHYSICIAN ASSISTANT

## 2025-02-04 PROCEDURE — 99214 OFFICE O/P EST MOD 30 MIN: CPT | Performed by: PHYSICIAN ASSISTANT

## 2025-02-04 PROCEDURE — 1159F MED LIST DOCD IN RCRD: CPT | Performed by: PHYSICIAN ASSISTANT

## 2025-02-04 PROCEDURE — 1036F TOBACCO NON-USER: CPT | Performed by: PHYSICIAN ASSISTANT

## 2025-02-04 PROCEDURE — 81003 URINALYSIS AUTO W/O SCOPE: CPT | Performed by: PHYSICIAN ASSISTANT

## 2025-02-04 PROCEDURE — 71046 X-RAY EXAM CHEST 2 VIEWS: CPT

## 2025-02-04 RX ORDER — HYDROCODONE BITARTRATE AND HOMATROPINE METHYLBROMIDE ORAL SOLUTION 5; 1.5 MG/5ML; MG/5ML
5 LIQUID ORAL EVERY 6 HOURS PRN
Qty: 100 ML | Refills: 0 | Status: SHIPPED | OUTPATIENT
Start: 2025-02-04 | End: 2025-02-09

## 2025-02-04 RX ORDER — LEVOFLOXACIN 500 MG/1
500 TABLET, FILM COATED ORAL DAILY
Qty: 7 TABLET | Refills: 0 | Status: SHIPPED | OUTPATIENT
Start: 2025-02-04 | End: 2025-02-11

## 2025-02-04 RX ORDER — ALBUTEROL SULFATE 0.83 MG/ML
5 SOLUTION RESPIRATORY (INHALATION) ONCE
Status: SHIPPED | OUTPATIENT
Start: 2025-02-04

## 2025-02-04 RX ORDER — PREDNISONE 20 MG/1
TABLET ORAL
Qty: 21 TABLET | Refills: 0 | Status: SHIPPED | OUTPATIENT
Start: 2025-02-04

## 2025-02-04 RX ORDER — BENZONATATE 200 MG/1
200 CAPSULE ORAL 3 TIMES DAILY PRN
Qty: 42 CAPSULE | Refills: 1 | Status: SHIPPED | OUTPATIENT
Start: 2025-02-04 | End: 2025-08-03

## 2025-02-04 NOTE — PROGRESS NOTES
"Subjective   Patient ID: Valerie Orona is a 75 y.o. female who presents for Follow-up.    HPI     Dr Kowalski pt here today for a follow up for testing positive for Flu on 1/25/25 and gave Tamiflu, Medrol Dose Chidi, cough syrup-finished all med.  Symptoms now dry cough, runny nose, wheezing, rattling, chest hurts and coughing so much that back hurts, slight SOB on exertion.     Back, ribs, abd all sore from coughing,  It is keeping her up at night  Wheeze goes away with coughing but there is no mucus production.  Body aches and fever gone.  She is not SOB with exertion    Pt is also c/o Uti sx. \" every time I go on antibiotics to get a UTI\". She is c/o urinary frequency, urgency, burning with urination, and lower back pain.  She also has suprapubic pressure. Sx started 3 d ago and have progressively worsened.       Review of Systems  Constitutional: Patient denies any fever, chills, loss of appetite, or unexplained weight loss.  Cardiovascular: Patient denies any chest pain, shortness of breath with exertion, tachycardia, palpitations, orthopnea, or paroxysmal nocturnal dyspnea.  Respiratory: Patient denies any shortness breath, SEE HPI  Gastrointestinal patient denies any nausea, vomiting, diarrhea, constipation, melena, hematochezia, or reflux symptoms  Skin: Denies any rashes or skin lesions   Neurology: Patient denies any new motor or sensory losses.  Denies any numbness, tingling, weakness, and incoordination of the extremities.  Patient also denies any tremor, seizures, or gait instability.  Endocrinology: Denies any polyuria, polydipsia, polyphagia, or heat/cold intolerance.    Objective   LMP 01/01/1996   Vitals:    02/04/25 1100   BP: 138/84   Pulse: 72   Temp: 36.2 °C (97.1 °F)   SpO2: 99%   Weight: 76.2 kg (168 lb)   Height: 1.524 m (5')        Physical Exam  HEENT:  Head is normocephalic and atraumatic.  Mucous members are moist, external auditory canals and tympanic membranes within normal limits " bilaterally, pharynx is without erythema or exudate.  There is a trace of clear/thin rhinorrhea.  NECK:  Supple and without adenopathy or rigidity.  HEART:  Regular rate and rhythm without murmur or ectopy  LUNGS:  Coarse breath sounds are noted bilaterally with a coarse frequent cough.  Bilateral expiratory wheezes are noted at bases. Good air exchange; post nebulizer treatment patient's lungs were CTA  SKIN:  Good turgor, moist, warm and without rashes or lesions  Abdomen: Soft and nondistended.  There is mild suprapubic tenderness with palpation.  No masses, guarding, rebound, or rigidity.  Bowel sounds are normal.  No abdominal bruits.  No CVA tenderness.    Assessment/Plan   Diagnoses and all orders for this visit:  Acute cough  -     XR chest 2 views; Future  -     albuterol 2.5 mg /3 mL (0.083 %) nebulizer solution 5 mg  -     levoFLOXacin (Levaquin) 500 mg tablet; Take 1 tablet (500 mg) by mouth once daily for 7 days.  -     predniSONE (Deltasone) 20 mg tablet; Take 3 tabs (60mg) daily for 5 days, then take 2 tabs (40mg) daily for 2 days, then take 1 tab (20mg) daily for 2 days.  -     hydrocodone-homatropine (Hycodan) 5-1.5 mg/5 mL syrup; Take 5 mL by mouth every 6 hours if needed for cough for up to 5 days.  -     benzonatate (Tessalon) 200 mg capsule; Take 1 capsule (200 mg) by mouth 3 times a day as needed for cough. Do not crush or chew.  Patient had a nebulizer in the office and her cough symptoms/wheezes improved.  I am concerned that she has either viral or a superimposed bacterial pneumonia so stat chest x-ray was ordered today.  She is to start on Hycodan cough syrup, Tessalon Perles 200 mg 3 times daily, a prednisone taper pack.  She also has a UTI so Levaquin was chosen to be able to treat both issues.     Wheezes  -     albuterol 2.5 mg /3 mL (0.083 %) nebulizer solution 5 mg  -     predniSONE (Deltasone) 20 mg tablet; Take 3 tabs (60mg) daily for 5 days, then take 2 tabs (40mg) daily for 2  days, then take 1 tab (20mg) daily for 2 days.  Again, patient responded to nebulizer treatment in the office so prednisone was prescribed.    Urinary tract infection with hematuria, site unspecified  -     Urine Culture  -     levoFLOXacin (Levaquin) 500 mg tablet; Take 1 tablet (500 mg) by mouth once daily for 7 days.  Patient will continue to hydrate and notify us if symptoms do not improve by day 5-7 or sooner should they worsen    UTI symptoms  -     POCT UA Automated manually resulted    We will call patient with chest x-ray as soon as it is reviewed.  She is to call/return to the clinic if symptoms not improved by Monday or sooner should they worsen.    Patient understands that should they have testing outside   facilities that we may not receive the results and was told to call us if they have not heard from our office within a week after testing.    [facility] uses voice recognition technology for dictations. Sometimes the software misinterprets words. Please take this into account when reading this.

## 2025-02-07 LAB — BACTERIA UR CULT: ABNORMAL

## 2025-02-27 LAB
ALBUMIN SERPL-MCNC: 4.2 G/DL (ref 3.6–5.1)
ALP SERPL-CCNC: 55 U/L (ref 37–153)
ALT SERPL-CCNC: 16 U/L (ref 6–29)
ANION GAP SERPL CALCULATED.4IONS-SCNC: 9 MMOL/L (CALC) (ref 7–17)
AST SERPL-CCNC: 14 U/L (ref 10–35)
BASOPHILS # BLD AUTO: 30 CELLS/UL (ref 0–200)
BASOPHILS NFR BLD AUTO: 0.5 %
BILIRUB SERPL-MCNC: 0.4 MG/DL (ref 0.2–1.2)
BUN SERPL-MCNC: 19 MG/DL (ref 7–25)
CALCIUM SERPL-MCNC: 9.1 MG/DL (ref 8.6–10.4)
CHLORIDE SERPL-SCNC: 104 MMOL/L (ref 98–110)
CHOLEST SERPL-MCNC: 218 MG/DL
CHOLEST/HDLC SERPL: 2.6 (CALC)
CO2 SERPL-SCNC: 27 MMOL/L (ref 20–32)
CREAT SERPL-MCNC: 0.85 MG/DL (ref 0.6–1)
EGFRCR SERPLBLD CKD-EPI 2021: 71 ML/MIN/1.73M2
EOSINOPHIL # BLD AUTO: 30 CELLS/UL (ref 15–500)
EOSINOPHIL NFR BLD AUTO: 0.5 %
ERYTHROCYTE [DISTWIDTH] IN BLOOD BY AUTOMATED COUNT: 12.9 % (ref 11–15)
EST. AVERAGE GLUCOSE BLD GHB EST-MCNC: 117 MG/DL
EST. AVERAGE GLUCOSE BLD GHB EST-SCNC: 6.5 MMOL/L
GLUCOSE SERPL-MCNC: 116 MG/DL (ref 65–139)
HBA1C MFR BLD: 5.7 % OF TOTAL HGB
HCT VFR BLD AUTO: 41.4 % (ref 35–45)
HDLC SERPL-MCNC: 85 MG/DL
HGB BLD-MCNC: 13.8 G/DL (ref 11.7–15.5)
LDLC SERPL CALC-MCNC: 109 MG/DL (CALC)
LYMPHOCYTES # BLD AUTO: 498 CELLS/UL (ref 850–3900)
LYMPHOCYTES NFR BLD AUTO: 8.3 %
MCH RBC QN AUTO: 29.6 PG (ref 27–33)
MCHC RBC AUTO-ENTMCNC: 33.3 G/DL (ref 32–36)
MCV RBC AUTO: 88.8 FL (ref 80–100)
MONOCYTES # BLD AUTO: 102 CELLS/UL (ref 200–950)
MONOCYTES NFR BLD AUTO: 1.7 %
NEUTROPHILS # BLD AUTO: 5340 CELLS/UL (ref 1500–7800)
NEUTROPHILS NFR BLD AUTO: 89 %
NONHDLC SERPL-MCNC: 133 MG/DL (CALC)
PLATELET # BLD AUTO: 239 THOUSAND/UL (ref 140–400)
PMV BLD REES-ECKER: 9.9 FL (ref 7.5–12.5)
POTASSIUM SERPL-SCNC: 4.3 MMOL/L (ref 3.5–5.3)
PROT SERPL-MCNC: 6.4 G/DL (ref 6.1–8.1)
RBC # BLD AUTO: 4.66 MILLION/UL (ref 3.8–5.1)
SODIUM SERPL-SCNC: 140 MMOL/L (ref 135–146)
TRIGL SERPL-MCNC: 125 MG/DL
WBC # BLD AUTO: 6 THOUSAND/UL (ref 3.8–10.8)

## 2025-03-03 ENCOUNTER — APPOINTMENT (OUTPATIENT)
Dept: PRIMARY CARE | Facility: CLINIC | Age: 75
End: 2025-03-03
Payer: COMMERCIAL

## 2025-03-03 VITALS
HEIGHT: 60 IN | DIASTOLIC BLOOD PRESSURE: 66 MMHG | HEART RATE: 84 BPM | SYSTOLIC BLOOD PRESSURE: 138 MMHG | RESPIRATION RATE: 16 BRPM | OXYGEN SATURATION: 98 % | TEMPERATURE: 97.5 F | BODY MASS INDEX: 33.77 KG/M2 | WEIGHT: 172 LBS

## 2025-03-03 DIAGNOSIS — Z23 NEED FOR VACCINATION: ICD-10-CM

## 2025-03-03 DIAGNOSIS — E53.8 VITAMIN B12 DEFICIENCY: ICD-10-CM

## 2025-03-03 DIAGNOSIS — E03.8 OTHER SPECIFIED HYPOTHYROIDISM: ICD-10-CM

## 2025-03-03 DIAGNOSIS — R73.03 PREDIABETES: ICD-10-CM

## 2025-03-03 DIAGNOSIS — E78.5 HYPERLIPIDEMIA, UNSPECIFIED HYPERLIPIDEMIA TYPE: Primary | ICD-10-CM

## 2025-03-03 DIAGNOSIS — B00.9 HSV-1 INFECTION: ICD-10-CM

## 2025-03-03 DIAGNOSIS — F41.1 GENERALIZED ANXIETY DISORDER: ICD-10-CM

## 2025-03-03 PROCEDURE — 1036F TOBACCO NON-USER: CPT | Performed by: FAMILY MEDICINE

## 2025-03-03 PROCEDURE — 91322 SARSCOV2 VAC 50 MCG/0.5ML IM: CPT | Performed by: FAMILY MEDICINE

## 2025-03-03 PROCEDURE — 96372 THER/PROPH/DIAG INJ SC/IM: CPT | Performed by: FAMILY MEDICINE

## 2025-03-03 PROCEDURE — 90480 ADMN SARSCOV2 VAC 1/ONLY CMP: CPT | Performed by: FAMILY MEDICINE

## 2025-03-03 PROCEDURE — 1160F RVW MEDS BY RX/DR IN RCRD: CPT | Performed by: FAMILY MEDICINE

## 2025-03-03 PROCEDURE — 1159F MED LIST DOCD IN RCRD: CPT | Performed by: FAMILY MEDICINE

## 2025-03-03 PROCEDURE — 99214 OFFICE O/P EST MOD 30 MIN: CPT | Performed by: FAMILY MEDICINE

## 2025-03-03 RX ORDER — FAMCICLOVIR 500 MG/1
500 TABLET ORAL 3 TIMES DAILY
Qty: 21 TABLET | Refills: 3 | Status: SHIPPED | OUTPATIENT
Start: 2025-03-03 | End: 2025-03-10

## 2025-03-03 RX ORDER — METFORMIN HYDROCHLORIDE 500 MG/1
500 TABLET, EXTENDED RELEASE ORAL
Qty: 90 TABLET | Refills: 3 | Status: SHIPPED | OUTPATIENT
Start: 2025-03-03

## 2025-03-03 RX ORDER — ALPRAZOLAM 0.5 MG/1
0.25 TABLET ORAL NIGHTLY PRN
Qty: 30 TABLET | Refills: 2 | Status: SHIPPED | OUTPATIENT
Start: 2025-03-03

## 2025-03-03 RX ORDER — ACYCLOVIR 50 MG/G
1 OINTMENT TOPICAL
Qty: 5 G | Refills: 3 | Status: SHIPPED | OUTPATIENT
Start: 2025-03-03 | End: 2025-03-07

## 2025-03-03 RX ORDER — ROSUVASTATIN CALCIUM 5 MG/1
5 TABLET, COATED ORAL DAILY
Qty: 100 TABLET | Refills: 3 | Status: SHIPPED | OUTPATIENT
Start: 2025-03-03 | End: 2026-04-07

## 2025-03-03 RX ADMIN — CYANOCOBALAMIN 1000 MCG: 1000 INJECTION, SOLUTION INTRAMUSCULAR; SUBCUTANEOUS at 14:21

## 2025-03-03 ASSESSMENT — PATIENT HEALTH QUESTIONNAIRE - PHQ9
1. LITTLE INTEREST OR PLEASURE IN DOING THINGS: NOT AT ALL
2. FEELING DOWN, DEPRESSED OR HOPELESS: NOT AT ALL
SUM OF ALL RESPONSES TO PHQ9 QUESTIONS 1 AND 2: 0

## 2025-03-03 NOTE — PROGRESS NOTES
Subjective   Patient ID: Valerie Orona is a 75 y.o. female who presents for FOLLOW UP VISIT.  Covid vax: x 6  Flu: UTD  Pneumo: UTD  RSV: UTD  Shingles: advised     CRC: 2018  Mammogram: 9/2024  Pap: hysterectomy  Lmp: hysterectomy  Up from hba1c 5.4 to 5.7  Ldl 109  Had uri-abtcs and steroids x 2 rounds  Tooth broken bridge and more abtcs    HPI  Patient Active Problem List   Diagnosis    Abdominal pain, colicky    Anemia    Arthritis    Bilateral high frequency sensorineural hearing loss    Chronic otitis externa    Fatigue    Generalized anxiety disorder    GERD (gastroesophageal reflux disease)    Hematuria    Oral herpes    Degenerative arthritis of thoracic spine    Other specified hypothyroidism    Prediabetes    Tinnitus of both ears    Vitamin B12 deficiency    Frequent UTI    Class 1 obesity with body mass index (BMI) of 31.0 to 31.9 in adult    Diverticulitis    Dizzy    Visual changes    Palpitations    Encounter to discuss test results    Paroxysmal atrial tachycardia (CMS-HCC)       Past Surgical History:   Procedure Laterality Date    BREAST SURGERY  1990    reduction    CATARACT EXTRACTION  2010    ?    CHOLECYSTECTOMY  2000    COLONOSCOPY  08/2018    suboptimal prep -tics and bowel surgery    COLOSTOMY  09/2018    COLOSTOMY CLOSURE  03/2019    MR HEAD ANGIO WO IV CONTRAST  9/15/2023    MR HEAD ANGIO WO IV CONTRAST 9/15/2023 [facility]    MR NECK ANGIO WO IV CONTRAST  9/15/2023    MR NECK ANGIO WO IV CONTRAST 9/15/2023 [facility]    TOTAL ABDOMINAL HYSTERECTOMY W/ BILATERAL SALPINGOOPHORECTOMY  1996    no CA       Review of Systems  This patient has  NO history of seizures/ CAD or CVA    NO history of recent Covid nor flu symptoms,    NO Fever nor chills today,    NO Chest pain, shortness of breath nor paroxysmal nocturnal dyspnea,  NO Nausea, vomiting, nor diarrhea,  NO Hematochezia nor melena,  NO Dysuria, hematuria, nor new incontinence issues  NO new severe headaches nor neurological  complaints,  NO new issues with anxiety nor depression nor new psychiatric complaints,  NO suicidal nor homicidal ideations.     OBJECTIVE:  /66   Pulse 84   Temp 36.4 °C (97.5 °F) (Temporal)   Resp 16   Ht 1.524 m (5')   Wt 78 kg (172 lb)   LMP 01/01/1996   SpO2 98%   BMI 33.59 kg/m²      General:  alert, oriented, no acute distress.  No obvious skin rashes noted.   No gait disturbance noted/baseline gait.    Mood is pleasant,  no signs of emotional distress.   Not appearing intoxicated or altered.   No voiced delusions,   Normal, appropriate behavior.    HEENT: Normocephalic, atraumatic,   Pupils round, reactive to light  Extraocular motions intact and wnl  Tympanic membranes normal    Neck: no nuchal rigidity  No masses palpable.  No carotid bruits.  No thyromegaly.    Respiratory: Equal breath sounds  No wheezes,    rales,    nor rhonchi  No respiratory distress.    Heart: Regular rate and rhythm, no    murmurs  no rubs/gallops    Abdomen: no masses palpable, nontender, no rebound nor guarding.  ovwt  Extremities: NO cyanosis noted, no clubbing.   No edema noted.  2+dorsalis pedis pulses.    Normal-not antalgic, steady gait.    Orders Only on 02/26/2025   Component Date Value Ref Range Status    CHOLESTEROL, TOTAL 02/26/2025 218 (H)  <200 mg/dL Final    HDL CHOLESTEROL 02/26/2025 85  > OR = 50 mg/dL Final    TRIGLYCERIDES 02/26/2025 125  <150 mg/dL Final    LDL-CHOLESTEROL 02/26/2025 109 (H)  mg/dL (calc) Final    Comment: Reference range: <100     Desirable range <100 mg/dL for primary prevention;    <70 mg/dL for patients with CHD or diabetic patients   with > or = 2 CHD risk factors.     LDL-C is now calculated using the Dario-Pebbles   calculation, which is a validated novel method providing   better accuracy than the Friedewald equation in the   estimation of LDL-C.   Dario MARTINEZ et al. AVERY. 2013;310(19): 3550-1801   (http://education.[employer]/faq/LVR348)      CHOL/HDLC RATIO  02/26/2025 2.6  <5.0 (calc) Final    NON HDL CHOLESTEROL 02/26/2025 133 (H)  <130 mg/dL (calc) Final    Comment: For patients with diabetes plus 1 major ASCVD risk   factor, treating to a non-HDL-C goal of <100 mg/dL   (LDL-C of <70 mg/dL) is considered a therapeutic   option.      GLUCOSE 02/26/2025 116  65 - 139 mg/dL Final    Comment:           Non-fasting reference interval     For someone without known diabetes, a glucose value  between 100 and 125 mg/dL is consistent with  prediabetes and should be confirmed with a  follow-up test.         UREA NITROGEN (BUN) 02/26/2025 19  7 - 25 mg/dL Final    CREATININE 02/26/2025 0.85  0.60 - 1.00 mg/dL Final    EGFR 02/26/2025 71  > OR = 60 mL/min/1.73m2 Final    SODIUM 02/26/2025 140  135 - 146 mmol/L Final    POTASSIUM 02/26/2025 4.3  3.5 - 5.3 mmol/L Final    CHLORIDE 02/26/2025 104  98 - 110 mmol/L Final    CARBON DIOXIDE 02/26/2025 27  20 - 32 mmol/L Final    ELECTROLYTE BALANCE 02/26/2025 9  7 - 17 mmol/L (calc) Final    CALCIUM 02/26/2025 9.1  8.6 - 10.4 mg/dL Final    PROTEIN, TOTAL 02/26/2025 6.4  6.1 - 8.1 g/dL Final    ALBUMIN 02/26/2025 4.2  3.6 - 5.1 g/dL Final    BILIRUBIN, TOTAL 02/26/2025 0.4  0.2 - 1.2 mg/dL Final    ALKALINE PHOSPHATASE 02/26/2025 55  37 - 153 U/L Final    AST 02/26/2025 14  10 - 35 U/L Final    ALT 02/26/2025 16  6 - 29 U/L Final    WHITE BLOOD CELL COUNT 02/26/2025 6.0  3.8 - 10.8 Thousand/uL Final    RED BLOOD CELL COUNT 02/26/2025 4.66  3.80 - 5.10 Million/uL Final    HEMOGLOBIN 02/26/2025 13.8  11.7 - 15.5 g/dL Final    HEMATOCRIT 02/26/2025 41.4  35.0 - 45.0 % Final    MCV 02/26/2025 88.8  80.0 - 100.0 fL Final    MCH 02/26/2025 29.6  27.0 - 33.0 pg Final    MCHC 02/26/2025 33.3  32.0 - 36.0 g/dL Final    Comment: For adults, a slight decrease in the calculated MCHC  value (in the range of 30 to 32 g/dL) is most likely  not clinically significant; however, it should be  interpreted with caution in correlation with other  red cell  parameters and the patient's clinical  condition.      RDW 02/26/2025 12.9  11.0 - 15.0 % Final    PLATELET COUNT 02/26/2025 239  140 - 400 Thousand/uL Final    MPV 02/26/2025 9.9  7.5 - 12.5 fL Final    ABSOLUTE NEUTROPHILS 02/26/2025 5,340  1,500 - 7,800 cells/uL Final    ABSOLUTE LYMPHOCYTES 02/26/2025 498 (L)  850 - 3,900 cells/uL Final    ABSOLUTE MONOCYTES 02/26/2025 102 (L)  200 - 950 cells/uL Final    ABSOLUTE EOSINOPHILS 02/26/2025 30  15 - 500 cells/uL Final    ABSOLUTE BASOPHILS 02/26/2025 30  0 - 200 cells/uL Final    NEUTROPHILS 02/26/2025 89  % Final    LYMPHOCYTES 02/26/2025 8.3  % Final    MONOCYTES 02/26/2025 1.7  % Final    EOSINOPHILS 02/26/2025 0.5  % Final    BASOPHILS 02/26/2025 0.5  % Final    HEMOGLOBIN A1c 02/26/2025 5.7 (H)  <5.7 % of total Hgb Final    Comment: For someone without known diabetes, a hemoglobin   A1c value between 5.7% and 6.4% is consistent with  prediabetes and should be confirmed with a   follow-up test.     For someone with known diabetes, a value <7%  indicates that their diabetes is well controlled. A1c  targets should be individualized based on duration of  diabetes, age, comorbid conditions, and other  considerations.     This assay result is consistent with an increased risk  of diabetes.     Currently, no consensus exists regarding use of  hemoglobin A1c for diagnosis of diabetes for children.         eAG (mg/dL) 02/26/2025 117  mg/dL Final    eAG (mmol/L) 02/26/2025 6.5  mmol/L Final   Office Visit on 02/04/2025   Component Date Value Ref Range Status    POC Color, Urine 02/04/2025 Yellow  Straw, Yellow, Light-Yellow Final    POC Appearance, Urine 02/04/2025 Cloudy (A)  Clear Final    POC Glucose, Urine 02/04/2025 NEGATIVE  NEGATIVE mg/dl Final    POC Bilirubin, Urine 02/04/2025 NEGATIVE  NEGATIVE Final    POC Ketones, Urine 02/04/2025 NEGATIVE  NEGATIVE mg/dl Final    POC Specific Gravity, Urine 02/04/2025 1.025  1.005 - 1.035 Final    POC Blood, Urine 02/04/2025  MODERATE (2+) (A)  NEGATIVE Final    POC PH, Urine 02/04/2025 5.5  No Reference Range Established PH Final    POC Protein, Urine 02/04/2025 NEGATIVE  NEGATIVE mg/dl Final    POC Urobilinogen, Urine 02/04/2025 0.2  0.2, 1.0 EU/DL Final    Poc Nitrite, Urine 02/04/2025 NEGATIVE  NEGATIVE Final    POC Leukocytes, Urine 02/04/2025 MODERATE (2+) (A)  NEGATIVE Final    CULTURE, URINE, ROUTINE 02/04/2025 SEE NOTE (A)   Final    Comment:     CULTURE, URINE, ROUTINE         Micro Number:      [id number]    Test Status:       Final    Specimen Source:   Urine    Specimen Quality:  Adequate    Result:            10,000-49,000 CFU/mL of Klebsiella pneumoniae                              K.pneumoniae                            ----------------                            INT   VIOLETA     AMOX/CLAVULANATE       S     <=2     AMP/SULBACTAM          S     <=2     CEFAZOLIN              NR    <=4 **2     CEFEPIME               S     <=0.12     CEFTAZIDIME            S     <=1     CEFTRIAXONE            S     <=0.25     CIPROFLOXACIN          S     <=0.06     GENTAMICIN             S     <=1     IMIPENEM               S     <=0.25     LEVOFLOXACIN           S     <=0.12     MEROPENEM              S     <=0.25     NITROFURANTOIN         I     64     PIP/TAZOBACTAM         S     <=4     TRIMETHOPRIM/SULFA     S     <=20    S = Susceptible  I = Intermediate  R = Resistant  NS = Not susceptible  SDD = Susceptible Dose Dependent  * = Not T                           ested  NR = Not Reported  **NN = See Therapy Comments      THERAPY COMMENTS        Note 1:      For infections other than uncomplicated UTI      caused by E. coli, K. pneumoniae or P. mirabilis:      Cefazolin is resistant if VIOLETA > or = 8 mcg/mL.      (Distinguishing susceptible versus intermediate      for isolates with VIOLETA < or = 4 mcg/mL requires      additional testing.)        Note 2:      For uncomplicated UTI caused by E. coli,      K. pneumoniae or P. mirabilis: Cefazolin  is      susceptible if VIOLETA <32 mcg/mL and predicts      susceptible to the oral agents cefaclor, cefdinir,      cefpodoxime, cefprozil, cefuroxime, cephalexin      and loracarbef.     Office Visit on 01/25/2025   Component Date Value Ref Range Status    POC Rapid Influenza A 01/25/2025 Positive (A)  Negative Final    POC Rapid Influenza B 01/25/2025 Negative  Negative Final    POC TEENA-COV-2 AG 01/25/2025 Presumptive negative test for SARS-CoV-2 (no antigen detected)  Presumptive negative test for SARS-CoV-2 (no antigen detected) Final        Assessment/Plan     Problem List Items Addressed This Visit       Generalized anxiety disorder    Relevant Orders    Follow Up In Primary Care    Other specified hypothyroidism    Prediabetes    Vitamin B12 deficiency   Ascvd explained    6mo hba1c cmp  And return to metformin  This medications risks, benefits, and alternatives were discussed with patient at length.  If any unwanted side effects occur-discontinue medicine and call the office for discussion.  Statin advised  Crestor This medications risks, benefits, and alternatives were discussed with patient at length.  If any unwanted side effects occur-discontinue medicine and call the office for discussion.  Benzo risks addressed  Takes xanax prn Overuse and abuse potential discussed with patient (parents if applicable)  If mood deterioration, status change etc on medicine, suicidal or homicidal ideations -patient agrees to proceed with crisis plan-in place-including-not limited to contacting family, our office and or proceeding to ER.  Reviewed controlled substance agreement - including but not limited to the risks-benefits-alternatives to treatment with a controlled substance medication(s).  It is recommended that [place] reports be checked and patient have appointment at least every 3months(6 for certain medicines only)  If the agreement signed (controlled substance agreement) is violated prescriptions may be stopped  abruptly and patient /family understands and agrees to this.  Another reason for termination of agreement is if we have concern for abuse or overuse and it is also recommended that patient take responsibility to try to taper and minimize use of these medicines frequently trying to limit or gradually taper to discontinuation.    Patient is aware that side effects such as insomnia, unexpected weight changes are unexpected and should result in discontinuation.  Always use caution and AVOID operating machinery(driving etc) on pain medicines or CNS depressants and avoid combining together OR with alcohol. If opioids are prescribed patient understands the benefits of narcan and was offered prescription.  Follow up sooner if condition deteriorates or problems arise.  Reviewed controlled substances agreement including but not limited to the benefits-risks and alternatives to treatment with a controlled substance medication.    Agrees to regular follow and counseling for maximum benefits.  Reqs famvir for hsv1  This medications risks, benefits, and alternatives were discussed with patient at length.  If any unwanted side effects occur-discontinue medicine and call the office for discussion.  3-6mo csm  Labs in 6mo    Follow up at next scheduled visit -as planned or directed today.  Sooner if new or unresolved issues of concern.

## 2025-03-03 NOTE — PROGRESS NOTES
Covid vax: x 6  Flu: UTD  Pneumo: UTD  RSV: UTD  Shingles: advised    CRC: 2018  Mammogram: 9/2024  Pap: hysterectomy  Lmp: hysterectomy

## 2025-04-01 ENCOUNTER — APPOINTMENT (OUTPATIENT)
Dept: PRIMARY CARE | Facility: CLINIC | Age: 75
End: 2025-04-01
Payer: COMMERCIAL

## 2025-04-01 DIAGNOSIS — E53.8 VITAMIN B12 DEFICIENCY: ICD-10-CM

## 2025-04-01 PROCEDURE — 96372 THER/PROPH/DIAG INJ SC/IM: CPT | Performed by: FAMILY MEDICINE

## 2025-04-01 RX ADMIN — CYANOCOBALAMIN 1000 MCG: 1000 INJECTION, SOLUTION INTRAMUSCULAR; SUBCUTANEOUS at 13:40

## 2025-05-06 ENCOUNTER — APPOINTMENT (OUTPATIENT)
Dept: PRIMARY CARE | Facility: CLINIC | Age: 75
End: 2025-05-06
Payer: COMMERCIAL

## 2025-05-07 ENCOUNTER — APPOINTMENT (OUTPATIENT)
Dept: PRIMARY CARE | Facility: CLINIC | Age: 75
End: 2025-05-07
Payer: COMMERCIAL

## 2025-05-07 DIAGNOSIS — E53.8 VITAMIN B12 DEFICIENCY: ICD-10-CM

## 2025-05-07 PROCEDURE — 96372 THER/PROPH/DIAG INJ SC/IM: CPT | Performed by: FAMILY MEDICINE

## 2025-05-07 RX ADMIN — CYANOCOBALAMIN 1000 MCG: 1000 INJECTION, SOLUTION INTRAMUSCULAR; SUBCUTANEOUS at 14:32

## 2025-06-09 ENCOUNTER — APPOINTMENT (OUTPATIENT)
Dept: PRIMARY CARE | Facility: CLINIC | Age: 75
End: 2025-06-09
Payer: MEDICARE

## 2025-06-09 DIAGNOSIS — E53.8 VITAMIN B12 DEFICIENCY: ICD-10-CM

## 2025-06-09 PROCEDURE — 96372 THER/PROPH/DIAG INJ SC/IM: CPT | Performed by: FAMILY MEDICINE

## 2025-06-09 RX ADMIN — CYANOCOBALAMIN 1000 MCG: 1000 INJECTION, SOLUTION INTRAMUSCULAR; SUBCUTANEOUS at 09:00

## 2025-07-10 ENCOUNTER — APPOINTMENT (OUTPATIENT)
Dept: PRIMARY CARE | Facility: CLINIC | Age: 75
End: 2025-07-10
Payer: MEDICARE

## 2025-08-11 ENCOUNTER — APPOINTMENT (OUTPATIENT)
Dept: PRIMARY CARE | Facility: CLINIC | Age: 75
End: 2025-08-11
Payer: COMMERCIAL

## 2025-08-11 DIAGNOSIS — E53.8 VITAMIN B12 DEFICIENCY: ICD-10-CM

## 2025-08-11 PROCEDURE — 96372 THER/PROPH/DIAG INJ SC/IM: CPT | Performed by: FAMILY MEDICINE

## 2025-08-11 RX ADMIN — CYANOCOBALAMIN 1000 MCG: 1000 INJECTION, SOLUTION INTRAMUSCULAR; SUBCUTANEOUS at 13:25

## 2025-08-30 LAB
ALBUMIN SERPL-MCNC: 4.3 G/DL (ref 3.6–5.1)
ALP SERPL-CCNC: 59 U/L (ref 37–153)
ALT SERPL-CCNC: 14 U/L (ref 6–29)
ANION GAP SERPL CALCULATED.4IONS-SCNC: 5 MMOL/L (CALC) (ref 7–17)
AST SERPL-CCNC: 15 U/L (ref 10–35)
BASOPHILS # BLD AUTO: 38 CELLS/UL (ref 0–200)
BASOPHILS NFR BLD AUTO: 0.8 %
BILIRUB SERPL-MCNC: 0.5 MG/DL (ref 0.2–1.2)
BUN SERPL-MCNC: 19 MG/DL (ref 7–25)
CALCIUM SERPL-MCNC: 9.4 MG/DL (ref 8.6–10.4)
CHLORIDE SERPL-SCNC: 106 MMOL/L (ref 98–110)
CHOLEST SERPL-MCNC: 134 MG/DL
CHOLEST/HDLC SERPL: 1.8 (CALC)
CO2 SERPL-SCNC: 29 MMOL/L (ref 20–32)
CREAT SERPL-MCNC: 0.64 MG/DL (ref 0.6–1)
EGFRCR SERPLBLD CKD-EPI 2021: 91 ML/MIN/1.73M2
EOSINOPHIL # BLD AUTO: 118 CELLS/UL (ref 15–500)
EOSINOPHIL NFR BLD AUTO: 2.5 %
ERYTHROCYTE [DISTWIDTH] IN BLOOD BY AUTOMATED COUNT: 13 % (ref 11–15)
EST. AVERAGE GLUCOSE BLD GHB EST-MCNC: 117 MG/DL
EST. AVERAGE GLUCOSE BLD GHB EST-SCNC: 6.5 MMOL/L
GLUCOSE SERPL-MCNC: 97 MG/DL (ref 65–99)
HBA1C MFR BLD: 5.7 %
HCT VFR BLD AUTO: 40 % (ref 35–45)
HDLC SERPL-MCNC: 73 MG/DL
HGB BLD-MCNC: 13.2 G/DL (ref 11.7–15.5)
LDLC SERPL CALC-MCNC: 46 MG/DL (CALC)
LYMPHOCYTES # BLD AUTO: 1152 CELLS/UL (ref 850–3900)
LYMPHOCYTES NFR BLD AUTO: 24.5 %
MCH RBC QN AUTO: 29.2 PG (ref 27–33)
MCHC RBC AUTO-ENTMCNC: 33 G/DL (ref 32–36)
MCV RBC AUTO: 88.5 FL (ref 80–100)
MONOCYTES # BLD AUTO: 338 CELLS/UL (ref 200–950)
MONOCYTES NFR BLD AUTO: 7.2 %
NEUTROPHILS # BLD AUTO: 3055 CELLS/UL (ref 1500–7800)
NEUTROPHILS NFR BLD AUTO: 65 %
NONHDLC SERPL-MCNC: 61 MG/DL (CALC)
PLATELET # BLD AUTO: 217 THOUSAND/UL (ref 140–400)
PMV BLD REES-ECKER: 10.2 FL (ref 7.5–12.5)
POTASSIUM SERPL-SCNC: 4.8 MMOL/L (ref 3.5–5.3)
PROT SERPL-MCNC: 6.4 G/DL (ref 6.1–8.1)
RBC # BLD AUTO: 4.52 MILLION/UL (ref 3.8–5.1)
SODIUM SERPL-SCNC: 140 MMOL/L (ref 135–146)
TRIGL SERPL-MCNC: 73 MG/DL
WBC # BLD AUTO: 4.7 THOUSAND/UL (ref 3.8–10.8)

## 2025-09-03 ENCOUNTER — APPOINTMENT (OUTPATIENT)
Dept: PRIMARY CARE | Facility: CLINIC | Age: 75
End: 2025-09-03
Payer: COMMERCIAL

## 2025-09-03 SDOH — ECONOMIC STABILITY: FOOD INSECURITY: WITHIN THE PAST 12 MONTHS, THE FOOD YOU BOUGHT JUST DIDN'T LAST AND YOU DIDN'T HAVE MONEY TO GET MORE.: NEVER TRUE

## 2025-09-03 SDOH — ECONOMIC STABILITY: TRANSPORTATION INSECURITY
IN THE PAST 12 MONTHS, HAS THE LACK OF TRANSPORTATION KEPT YOU FROM MEDICAL APPOINTMENTS OR FROM GETTING MEDICATIONS?: NO

## 2025-09-03 SDOH — ECONOMIC STABILITY: FOOD INSECURITY: WITHIN THE PAST 12 MONTHS, YOU WORRIED THAT YOUR FOOD WOULD RUN OUT BEFORE YOU GOT MONEY TO BUY MORE.: NEVER TRUE

## 2025-09-03 SDOH — ECONOMIC STABILITY: INCOME INSECURITY: IN THE LAST 12 MONTHS, WAS THERE A TIME WHEN YOU WERE NOT ABLE TO PAY THE MORTGAGE OR RENT ON TIME?: NO

## 2025-09-03 SDOH — ECONOMIC STABILITY: TRANSPORTATION INSECURITY
IN THE PAST 12 MONTHS, HAS LACK OF TRANSPORTATION KEPT YOU FROM MEETINGS, WORK, OR FROM GETTING THINGS NEEDED FOR DAILY LIVING?: NO

## 2025-09-03 ASSESSMENT — ACTIVITIES OF DAILY LIVING (ADL)
DRESSING: INDEPENDENT
DOING_HOUSEWORK: INDEPENDENT
GROCERY_SHOPPING: INDEPENDENT
BATHING: INDEPENDENT
MANAGING_FINANCES: INDEPENDENT
TAKING_MEDICATION: INDEPENDENT

## 2025-09-09 ENCOUNTER — APPOINTMENT (OUTPATIENT)
Dept: CARDIOLOGY | Facility: CLINIC | Age: 75
End: 2025-09-09
Payer: MEDICARE

## 2025-09-11 ENCOUNTER — APPOINTMENT (OUTPATIENT)
Dept: PRIMARY CARE | Facility: CLINIC | Age: 75
End: 2025-09-11
Payer: MEDICARE

## 2026-01-06 ENCOUNTER — APPOINTMENT (OUTPATIENT)
Dept: PRIMARY CARE | Facility: CLINIC | Age: 76
End: 2026-01-06
Payer: MEDICARE

## (undated) DEVICE — PATIENT RETURN ELECTRODE, SINGLE-USE, CONTACT QUALITY MONITORING, ADULT, WITH 9FT CORD, FOR PATIENTS WEIGING OVER 33LBS. (15KG): Brand: MEGADYNE

## (undated) DEVICE — BINDER ABD UNISX 9IN 62IN L AND XL UNIV

## (undated) DEVICE — LABEL MED MINI W/ MARKER

## (undated) DEVICE — STERILE LATEX POWDER-FREE SURGICAL GLOVESWITH NITRILE COATING: Brand: PROTEXIS

## (undated) DEVICE — 35 ML SYRINGE LUER-LOCK TIP: Brand: MONOJECT

## (undated) DEVICE — DBD-PACK,LITHOTOMY,PK II,AURORA: Brand: MEDLINE

## (undated) DEVICE — SUTURE PERMA-HAND SZ 2-0 L30IN NONABSORBABLE BLK L26MM SH K833H

## (undated) DEVICE — FLATUS BAG WITH RECTAL TUBE, NON-LUBRICATED TIP 24 FR/CH (8.0 MM) X 19 IN. (48.3 CM).: Brand: DOVER

## (undated) DEVICE — LUBRICANT SURG JELLY ST BACTER TUBE 4.25OZ

## (undated) DEVICE — SUTURE PDS II SZ 1 L36IN ABSRB VLT CT L40MM 1/2 CIR TAPR Z359T

## (undated) DEVICE — ADAPTER FLSH PMP FLD MGMT GI IRRIG OFP 2 DISPOSABLE

## (undated) DEVICE — Z DUPLICATE USE 2716240 STAPLER INT CUT LN L40MM STPL L51MM GRN CRV HD B FRM

## (undated) DEVICE — TOWEL,OR,DSP,ST,BLUE,STD,4/PK,20PK/CS: Brand: MEDLINE

## (undated) DEVICE — Z DISCONTINUED USE 2716238 PER MEDLINE STAPLER INT L40MM DIA4.7MM GRN CRV HD B FRM TECHNOLOGY DISP

## (undated) DEVICE — CATHETER URET 5FR L70CM OPN END SGL LUMN INJ HUB FLEXIMA

## (undated) DEVICE — OSTOMY POUCH CLAMP: Brand: HOLLISTER

## (undated) DEVICE — JELLY,LUBE,STERILE,FLIP TOP,TUBE,2-OZ: Brand: MEDLINE

## (undated) DEVICE — ENDO CARRY-ON PROCEDURE KIT: Brand: ENDO CARRY-ON PROCEDURE KIT

## (undated) DEVICE — SUTURE VCRL + SZ 2-0 L36IN ABSRB UD L36MM CT-1 1/2 CIR VCP945H

## (undated) DEVICE — COUNTER NDL 40 COUNT HLD 70 FOAM BLK ADH W/ MAG

## (undated) DEVICE — GLOVE SURG SZ 85 STD WHT LTX SYN POLYMER BEAD REINF ANTI RL

## (undated) DEVICE — TUBE SET 96 MM 64 MM H2O PERISTALTIC STD AUX CHANNEL

## (undated) DEVICE — SONY PRINTER PAPER

## (undated) DEVICE — TTL1LYR 16FR10ML 100%SIL TMPST TR: Brand: MEDLINE

## (undated) DEVICE — Device: Brand: ENDO SMARTCAP

## (undated) DEVICE — TOTAL TRAY, DB, 100% SILI FOLEY, 16FR 10: Brand: MEDLINE

## (undated) DEVICE — Z DISCONTINUED PER MEDLINE USE 2741943 DRESSING AQUACEL 10 IN ALG W9XL25CM SIL CVR WTRPRF VIR BACT BARR ANTIMIC

## (undated) DEVICE — SUTURE VCRL SZ 0 L36IN ABSRB UD L36MM CT-1 1/2 CIR J946H

## (undated) DEVICE — SEALER TISS L20CM DIA13MM ADV BPLR L CRV JAW OPN APPRCH

## (undated) DEVICE — GOWN,SIRUS,POLYRNF,BRTHSLV,XLN/XL,20/CS: Brand: MEDLINE

## (undated) DEVICE — 3M™ TEGADERM™ TRANSPARENT FILM DRESSING FRAME STYLE, 1626W, 4 IN X 4-3/4 IN (10 CM X 12 CM), 50/CT 4CT/CASE: Brand: 3M™ TEGADERM™

## (undated) DEVICE — CYSTO/BLADDER IRRIGATION SET, REGULATING CLAMP

## (undated) DEVICE — Z CONVERTED USE 2271043 CONTAINER SPEC COLL 4OZ SCR ON LID PEEL PCH

## (undated) DEVICE — GOWN,AURORA,NONREINFORCED,LARGE: Brand: MEDLINE

## (undated) DEVICE — UNDER BUTTOCKS DRAPE WITH FLUID CONTROL POUCH: Brand: CONVERTORS

## (undated) DEVICE — GUIDEWIRE URO L150CM DIA0.035IN TAPR 8CM STR TIP STD SHFT

## (undated) DEVICE — INTENDED FOR TISSUE SEPARATION, AND OTHER PROCEDURES THAT REQUIRE A SHARP SURGICAL BLADE TO PUNCTURE OR CUT.: Brand: BARD-PARKER ® CARBON RIB-BACK BLADES

## (undated) DEVICE — 2-PIECE OSTOMY SKIN BARRIER, FLEXWEAR: Brand: NEW IMAGE

## (undated) DEVICE — SUTURE NONABSORBABLE BRAIDED 4-0 SH 30 IN BLK PERMA HND K831H

## (undated) DEVICE — SUTURE PROL SZ 3-0 L30IN NONABSORBABLE BLU L30MM FS-1 3/8 8675H

## (undated) DEVICE — Z DISCONTINUED USE 2271023 SYRINGE IRRIGATION TOOM 70 CC CATH LUER ADPT TIP PLAS STRL

## (undated) DEVICE — ELECTROSURGICAL PENCIL BUTTON SWITCH E-Z CLEAN COATED BLADE ELECTRODE 10 FT (3 M) CORD HOLSTER: Brand: MEGADYNE

## (undated) DEVICE — TRAY PREP DRY W/ PREM GLV 2 APPL 6 SPNG 2 UNDPD 1 OVERWRAP

## (undated) DEVICE — DRAIN ROUND 15FR PERFORATED SURG L49IN DIA3/16IN 10IN H SIL W/O TRCR END PERF

## (undated) DEVICE — SYRINGE MED 10ML LUERLOCK TIP W/O SFTY DISP

## (undated) DEVICE — TUBING, SUCTION, 1/4" X 10', STRAIGHT: Brand: MEDLINE

## (undated) DEVICE — 1200CC COLLECTION UNIT 6MM X 6' PATIENT: Brand: MEDI-VAC

## (undated) DEVICE — DRAPE, LAVH, STERILE: Brand: MEDLINE

## (undated) DEVICE — COVER LT HNDL BLU PLAS

## (undated) DEVICE — 2-PIECE DRAINABLE OSTOMY POUCH: Brand: NEW IMAGE

## (undated) DEVICE — SYRINGE IRRIG 60ML SFT PLIABLE BLB EZ TO GRP 1 HND USE W/

## (undated) DEVICE — SHEET,DRAPE,53X77,STERILE: Brand: MEDLINE

## (undated) DEVICE — CHLORAPREP 26ML ORANGE

## (undated) DEVICE — Z INACTIVE USE 2659962 CONNECTOR CATH URET SIDE PRT FOR FRIC CONN [UCA595] [GU TEK]

## (undated) DEVICE — SPONGE,LAP,18"X18",DLX,XR,ST,5/PK,40/PK: Brand: MEDLINE

## (undated) DEVICE — Z DISCONTINUED NO SUB IDED GLOVE SURG BEAD CUF 8 STD PF WHT STRL TRIUMPH LT LTX

## (undated) DEVICE — PACK,SET UP,DRAPE: Brand: MEDLINE

## (undated) DEVICE — COVER,TABLE,44X90,STERILE: Brand: MEDLINE

## (undated) DEVICE — DRAPE THER FLUID WARMING 66X44 IN FLAT SLUSH DBL DISC ORS

## (undated) DEVICE — BLADE ES ELASTOMERIC COAT INSUL DURABLE BEND UPTO 90DEG

## (undated) DEVICE — E-Z CLEAN, NON-STICK, PTFE COATED, ELECTROSURGICAL BLADE ELECTRODE, 6.5 INCH (16.5 CM): Brand: MEGADYNE

## (undated) DEVICE — BW-412T DISP COMBO CLEANING BRUSH: Brand: SINGLE USE COMBINATION CLEANING BRUSH

## (undated) DEVICE — STAPLER SKIN H3.9MM WIRE DIA0.58MM CRWN 6.9MM 35 STPL FIX

## (undated) DEVICE — SUTURE CHROMIC GUT SZ 2-0 L27IN ABSRB BRN L26MM SH 1/2 CIR G123H

## (undated) DEVICE — CATHETER URETH 22FR 30CC SIL BLLN ELASTMR 2 W F BARDX

## (undated) DEVICE — 3M™ IOBAN™ 2 ANTIMICROBIAL INCISE DRAPE 6650EZ: Brand: IOBAN™ 2